# Patient Record
Sex: MALE | Race: BLACK OR AFRICAN AMERICAN | NOT HISPANIC OR LATINO | Employment: OTHER | URBAN - METROPOLITAN AREA
[De-identification: names, ages, dates, MRNs, and addresses within clinical notes are randomized per-mention and may not be internally consistent; named-entity substitution may affect disease eponyms.]

---

## 2017-10-03 LAB
COLOR STL: NORMAL
CONSISTENCY STL: NORMAL
HEMOCCULT SP1 STL QL: NEGATIVE

## 2017-10-04 LAB
COLOR STL: NORMAL
COLOR STL: NORMAL
CONSISTENCY STL: NORMAL
CONSISTENCY STL: NORMAL
HEMOCCULT SP2 STL QL: NEGATIVE

## 2017-10-05 ENCOUNTER — HISTORICAL (OUTPATIENT)
Dept: INTERNAL MEDICINE | Facility: CLINIC | Age: 57
End: 2017-10-05

## 2017-10-05 LAB
ABS NEUT (OLG): 3.82 X10(3)/MCL (ref 2.1–9.2)
ALBUMIN SERPL-MCNC: 3.7 GM/DL (ref 3.4–5)
ALBUMIN/GLOB SERPL: 1 RATIO (ref 1–2)
ALP SERPL-CCNC: 59 UNIT/L (ref 45–117)
ALT SERPL-CCNC: 35 UNIT/L (ref 12–78)
AST SERPL-CCNC: 31 UNIT/L (ref 15–37)
BASOPHILS # BLD AUTO: 0.07 X10(3)/MCL
BASOPHILS NFR BLD AUTO: 1 % (ref 0–1)
BILIRUB SERPL-MCNC: 0.6 MG/DL (ref 0.2–1)
BILIRUBIN DIRECT+TOT PNL SERPL-MCNC: 0.2 MG/DL
BILIRUBIN DIRECT+TOT PNL SERPL-MCNC: 0.4 MG/DL
BUN SERPL-MCNC: 14 MG/DL (ref 7–18)
CALCIUM SERPL-MCNC: 8.7 MG/DL (ref 8.5–10.1)
CHLORIDE SERPL-SCNC: 104 MMOL/L (ref 98–107)
CHOLEST SERPL-MCNC: 155 MG/DL
CHOLEST/HDLC SERPL: 3.5 {RATIO} (ref 0–5)
CO2 SERPL-SCNC: 30 MMOL/L (ref 21–32)
CREAT SERPL-MCNC: 0.8 MG/DL (ref 0.6–1.3)
EOSINOPHIL # BLD AUTO: 0.47 X10(3)/MCL
EOSINOPHIL NFR BLD AUTO: 7 % (ref 0–5)
ERYTHROCYTE [DISTWIDTH] IN BLOOD BY AUTOMATED COUNT: 13 % (ref 11.5–14.5)
EST. AVERAGE GLUCOSE BLD GHB EST-MCNC: 120 MG/DL
GLOBULIN SER-MCNC: 3.6 GM/ML (ref 2.3–3.5)
GLUCOSE SERPL-MCNC: 96 MG/DL (ref 74–106)
HBA1C MFR BLD: 5.8 % (ref 4.2–6.3)
HCT VFR BLD AUTO: 38.8 % (ref 40–51)
HDLC SERPL-MCNC: 44 MG/DL
HGB BLD-MCNC: 13.5 GM/DL (ref 13.5–17.5)
IMM GRANULOCYTES # BLD AUTO: 0.03 10*3/UL
IMM GRANULOCYTES NFR BLD AUTO: 0 %
LDLC SERPL CALC-MCNC: 92 MG/DL (ref 0–130)
LYMPHOCYTES # BLD AUTO: 1.82 X10(3)/MCL
LYMPHOCYTES NFR BLD AUTO: 27 % (ref 15–40)
MCH RBC QN AUTO: 30.5 PG (ref 26–34)
MCHC RBC AUTO-ENTMCNC: 34.8 GM/DL (ref 31–37)
MCV RBC AUTO: 87.8 FL (ref 80–100)
MONOCYTES # BLD AUTO: 0.62 X10(3)/MCL
MONOCYTES NFR BLD AUTO: 9 % (ref 4–12)
NEUTROPHILS # BLD AUTO: 3.82 X10(3)/MCL
NEUTROPHILS NFR BLD AUTO: 56 X10(3)/MCL
PLATELET # BLD AUTO: 207 X10(3)/MCL (ref 130–400)
PMV BLD AUTO: 11.1 FL (ref 7.4–10.4)
POTASSIUM SERPL-SCNC: 3.5 MMOL/L (ref 3.5–5.1)
PROT SERPL-MCNC: 7.3 GM/DL (ref 6.4–8.2)
PSA SERPL-MCNC: 0.6 NG/ML
RBC # BLD AUTO: 4.42 X10(6)/MCL (ref 4.5–5.9)
SODIUM SERPL-SCNC: 140 MMOL/L (ref 136–145)
TRIGL SERPL-MCNC: 94 MG/DL
TSH SERPL-ACNC: 0.38 MIU/L (ref 0.36–3.74)
VLDLC SERPL CALC-MCNC: 19 MG/DL
WBC # SPEC AUTO: 6.8 X10(3)/MCL (ref 4.5–11)

## 2019-04-10 ENCOUNTER — HISTORICAL (OUTPATIENT)
Dept: ADMINISTRATIVE | Facility: HOSPITAL | Age: 59
End: 2019-04-10

## 2019-04-10 LAB
ABS NEUT (OLG): 4.35 X10(3)/MCL (ref 2.1–9.2)
ALBUMIN SERPL-MCNC: 3.9 GM/DL (ref 3.4–5)
ALBUMIN/GLOB SERPL: 1 RATIO (ref 1.1–2)
ALP SERPL-CCNC: 64 UNIT/L (ref 45–117)
ALT SERPL-CCNC: 39 UNIT/L (ref 12–78)
AST SERPL-CCNC: 36 UNIT/L (ref 15–37)
BASOPHILS # BLD AUTO: 0.09 X10(3)/MCL
BASOPHILS NFR BLD AUTO: 1 %
BILIRUB SERPL-MCNC: 0.6 MG/DL (ref 0.2–1)
BILIRUBIN DIRECT+TOT PNL SERPL-MCNC: 0.2 MG/DL
BILIRUBIN DIRECT+TOT PNL SERPL-MCNC: 0.4 MG/DL
BUN SERPL-MCNC: 21 MG/DL (ref 7–18)
CALCIUM SERPL-MCNC: 9 MG/DL (ref 8.5–10.1)
CHLORIDE SERPL-SCNC: 103 MMOL/L (ref 98–107)
CHOLEST SERPL-MCNC: 163 MG/DL
CHOLEST/HDLC SERPL: 3.8 {RATIO} (ref 0–5)
CO2 SERPL-SCNC: 32 MMOL/L (ref 21–32)
CREAT SERPL-MCNC: 0.8 MG/DL (ref 0.6–1.3)
EOSINOPHIL # BLD AUTO: 0.69 10*3/UL
EOSINOPHIL NFR BLD AUTO: 8 %
ERYTHROCYTE [DISTWIDTH] IN BLOOD BY AUTOMATED COUNT: 13.8 % (ref 11.5–14.5)
EST. AVERAGE GLUCOSE BLD GHB EST-MCNC: 134 MG/DL
GLOBULIN SER-MCNC: 3.8 GM/ML (ref 2.3–3.5)
GLUCOSE SERPL-MCNC: 123 MG/DL (ref 74–106)
HBA1C MFR BLD: 6.3 % (ref 4.2–6.3)
HCT VFR BLD AUTO: 42 % (ref 40–51)
HDLC SERPL-MCNC: 43 MG/DL
HGB BLD-MCNC: 14.3 GM/DL (ref 13.5–17.5)
IMM GRANULOCYTES # BLD AUTO: 0.02 10*3/UL
IMM GRANULOCYTES NFR BLD AUTO: 0 %
LDLC SERPL CALC-MCNC: 94 MG/DL (ref 0–130)
LYMPHOCYTES # BLD AUTO: 2.09 X10(3)/MCL
LYMPHOCYTES NFR BLD AUTO: 26 % (ref 13–40)
MCH RBC QN AUTO: 30 PG (ref 26–34)
MCHC RBC AUTO-ENTMCNC: 34 GM/DL (ref 31–37)
MCV RBC AUTO: 88.2 FL (ref 80–100)
MONOCYTES # BLD AUTO: 0.84 X10(3)/MCL
MONOCYTES NFR BLD AUTO: 10 % (ref 4–12)
NEUTROPHILS # BLD AUTO: 4.35 X10(3)/MCL
NEUTROPHILS NFR BLD AUTO: 54 X10(3)/MCL
PLATELET # BLD AUTO: 210 X10(3)/MCL (ref 130–400)
PMV BLD AUTO: 10.9 FL (ref 7.4–10.4)
POTASSIUM SERPL-SCNC: 3.4 MMOL/L (ref 3.5–5.1)
PROT SERPL-MCNC: 7.7 GM/DL (ref 6.4–8.2)
RBC # BLD AUTO: 4.76 X10(6)/MCL (ref 4.5–5.9)
SODIUM SERPL-SCNC: 140 MMOL/L (ref 136–145)
TRIGL SERPL-MCNC: 129 MG/DL
TSH SERPL-ACNC: 0.78 MIU/L (ref 0.36–3.74)
VLDLC SERPL CALC-MCNC: 26 MG/DL
WBC # SPEC AUTO: 8.1 X10(3)/MCL (ref 4.5–11)

## 2020-05-20 ENCOUNTER — HISTORICAL (OUTPATIENT)
Dept: INTERNAL MEDICINE | Facility: CLINIC | Age: 60
End: 2020-05-20

## 2020-05-20 LAB
ABS NEUT (OLG): 4.03 X10(3)/MCL (ref 2.1–9.2)
ALBUMIN SERPL-MCNC: 3.2 GM/DL (ref 3.4–5)
ALBUMIN/GLOB SERPL: 0.7 RATIO (ref 1.1–2)
ALP SERPL-CCNC: 95 UNIT/L (ref 45–117)
ALT SERPL-CCNC: 45 UNIT/L (ref 12–78)
AST SERPL-CCNC: 30 UNIT/L (ref 15–37)
BASOPHILS # BLD AUTO: 0.1 X10(3)/MCL (ref 0–0.2)
BASOPHILS NFR BLD AUTO: 1 %
BILIRUB SERPL-MCNC: 0.6 MG/DL (ref 0.2–1)
BILIRUBIN DIRECT+TOT PNL SERPL-MCNC: 0.2 MG/DL (ref 0–0.2)
BILIRUBIN DIRECT+TOT PNL SERPL-MCNC: 0.4 MG/DL
BUN SERPL-MCNC: 13 MG/DL (ref 7–18)
CALCIUM SERPL-MCNC: 8.8 MG/DL (ref 8.5–10.1)
CHLORIDE SERPL-SCNC: 103 MMOL/L (ref 98–107)
CHOLEST SERPL-MCNC: 171 MG/DL
CHOLEST/HDLC SERPL: 4.8 {RATIO} (ref 0–5)
CO2 SERPL-SCNC: 30 MMOL/L (ref 21–32)
CREAT SERPL-MCNC: 0.9 MG/DL (ref 0.6–1.3)
EOSINOPHIL # BLD AUTO: 0.8 X10(3)/MCL (ref 0–0.9)
EOSINOPHIL NFR BLD AUTO: 11 %
ERYTHROCYTE [DISTWIDTH] IN BLOOD BY AUTOMATED COUNT: 13.3 % (ref 11.5–14.5)
EST. AVERAGE GLUCOSE BLD GHB EST-MCNC: 148 MG/DL
GLOBULIN SER-MCNC: 4.9 GM/ML (ref 2.3–3.5)
GLUCOSE SERPL-MCNC: 107 MG/DL (ref 74–106)
HBA1C MFR BLD: 6.8 % (ref 4.2–6.3)
HCT VFR BLD AUTO: 42.5 % (ref 40–51)
HDLC SERPL-MCNC: 36 MG/DL (ref 40–59)
HGB BLD-MCNC: 13.9 GM/DL (ref 13.5–17.5)
IMM GRANULOCYTES # BLD AUTO: 0.02 10*3/UL
IMM GRANULOCYTES NFR BLD AUTO: 0 %
LDLC SERPL CALC-MCNC: 114 MG/DL
LYMPHOCYTES # BLD AUTO: 1.7 X10(3)/MCL (ref 0.6–4.6)
LYMPHOCYTES NFR BLD AUTO: 23 %
MCH RBC QN AUTO: 28.6 PG (ref 26–34)
MCHC RBC AUTO-ENTMCNC: 32.7 GM/DL (ref 31–37)
MCV RBC AUTO: 87.4 FL (ref 80–100)
MONOCYTES # BLD AUTO: 0.8 X10(3)/MCL (ref 0.1–1.3)
MONOCYTES NFR BLD AUTO: 11 %
NEUTROPHILS # BLD AUTO: 4.03 X10(3)/MCL (ref 2.1–9.2)
NEUTROPHILS NFR BLD AUTO: 54 %
PLATELET # BLD AUTO: 273 X10(3)/MCL (ref 130–400)
PMV BLD AUTO: 11.1 FL (ref 7.4–10.4)
POTASSIUM SERPL-SCNC: 3.5 MMOL/L (ref 3.5–5.1)
PROT SERPL-MCNC: 8.1 GM/DL (ref 6.4–8.2)
RBC # BLD AUTO: 4.86 X10(6)/MCL (ref 4.5–5.9)
SODIUM SERPL-SCNC: 139 MMOL/L (ref 136–145)
TRIGL SERPL-MCNC: 103 MG/DL
TSH SERPL-ACNC: 0.41 MIU/L (ref 0.36–3.74)
VLDLC SERPL CALC-MCNC: 21 MG/DL
WBC # SPEC AUTO: 7.5 X10(3)/MCL (ref 4.5–11)

## 2020-09-03 ENCOUNTER — HOSPITAL ENCOUNTER (OUTPATIENT)
Dept: MEDSURG UNIT | Facility: HOSPITAL | Age: 60
End: 2020-09-05
Attending: INTERNAL MEDICINE | Admitting: INTERNAL MEDICINE

## 2020-09-03 LAB
ABS NEUT (OLG): 5.61 X10(3)/MCL (ref 2.1–9.2)
ALBUMIN SERPL-MCNC: 2.9 GM/DL (ref 3.4–5)
ALBUMIN/GLOB SERPL: 0.6 RATIO (ref 1.1–2)
ALP SERPL-CCNC: 77 UNIT/L (ref 45–117)
ALT SERPL-CCNC: 30 UNIT/L (ref 12–78)
APPEARANCE, UA: CLEAR
AST SERPL-CCNC: 31 UNIT/L (ref 15–37)
BACTERIA #/AREA URNS AUTO: ABNORMAL /HPF
BASOPHILS # BLD AUTO: 0 X10(3)/MCL (ref 0–0.2)
BASOPHILS NFR BLD AUTO: 0 %
BILIRUB SERPL-MCNC: 0.5 MG/DL (ref 0.2–1)
BILIRUB UR QL STRIP: NEGATIVE
BILIRUBIN DIRECT+TOT PNL SERPL-MCNC: 0.2 MG/DL (ref 0–0.2)
BILIRUBIN DIRECT+TOT PNL SERPL-MCNC: 0.3 MG/DL
BUN SERPL-MCNC: 16 MG/DL (ref 7–18)
CALCIUM SERPL-MCNC: 8.7 MG/DL (ref 8.5–10.1)
CHLORIDE SERPL-SCNC: 105 MMOL/L (ref 98–107)
CK MB SERPL-MCNC: 5.4 NG/ML (ref 1–3.6)
CK SERPL-CCNC: 426 UNIT/L (ref 39–308)
CO2 SERPL-SCNC: 29 MMOL/L (ref 21–32)
COLOR UR: YELLOW
CREAT SERPL-MCNC: 0.7 MG/DL (ref 0.6–1.3)
D DIMER PPP IA.FEU-MCNC: 2.3 MCG/ML FEU
EOSINOPHIL # BLD AUTO: 0.9 X10(3)/MCL (ref 0–0.9)
EOSINOPHIL NFR BLD AUTO: 11 %
ERYTHROCYTE [DISTWIDTH] IN BLOOD BY AUTOMATED COUNT: 13.9 % (ref 11.5–14.5)
GLOBULIN SER-MCNC: 5.1 GM/ML (ref 2.3–3.5)
GLUCOSE (UA): NEGATIVE
GLUCOSE SERPL-MCNC: 106 MG/DL (ref 74–106)
HCO3 UR-SCNC: 29.9 MMOL/L (ref 22–26)
HCT VFR BLD AUTO: 38.6 % (ref 40–51)
HGB BLD-MCNC: 12.5 GM/DL (ref 13.5–17.5)
HGB UR QL STRIP: NEGATIVE
HYALINE CASTS #/AREA URNS LPF: ABNORMAL /LPF
IMM GRANULOCYTES # BLD AUTO: 0.03 10*3/UL
IMM GRANULOCYTES NFR BLD AUTO: 0 %
KETONES UR QL STRIP: NEGATIVE
LEUKOCYTE ESTERASE UR QL STRIP: NEGATIVE
LYMPHOCYTES # BLD AUTO: 0.9 X10(3)/MCL (ref 0.6–4.6)
LYMPHOCYTES NFR BLD AUTO: 11 %
MCH RBC QN AUTO: 27.8 PG (ref 26–34)
MCHC RBC AUTO-ENTMCNC: 32.4 GM/DL (ref 31–37)
MCV RBC AUTO: 86 FL (ref 80–100)
MONOCYTES # BLD AUTO: 0.7 X10(3)/MCL (ref 0.1–1.3)
MONOCYTES NFR BLD AUTO: 8 %
NEUTROPHILS # BLD AUTO: 5.61 X10(3)/MCL (ref 2.1–9.2)
NEUTROPHILS NFR BLD AUTO: 68 %
NITRITE UR QL STRIP: NEGATIVE
O2 HGB ARTERIAL: 91.8 % (ref 94–100)
PCO2 BLDA: 42 MMHG (ref 35–45)
PH SMN: 7.46 [PH] (ref 7.35–7.45)
PH UR STRIP: 7.5 [PH] (ref 4.5–8)
PLATELET # BLD AUTO: 282 X10(3)/MCL (ref 130–400)
PMV BLD AUTO: 10.1 FL (ref 7.4–10.4)
PO2 BLDA: 62 MMHG (ref 80–100)
POC ALLENS TEST: POSITIVE
POC BE: 5.5 (ref -2–2)
POC CO HGB: 2.2 %
POC CO2: 31.2 MMOL/L (ref 22–26)
POC MET HGB: 1 % (ref 0.4–1.5)
POC SAMPLESOURCE: ABNORMAL
POC SATURATED O2: 94.8 %
POC SITE: ABNORMAL
POC THB: 13.5 GM/DL (ref 12–16)
POC TREATMENT: ABNORMAL
POTASSIUM SERPL-SCNC: 3.9 MMOL/L (ref 3.5–5.1)
PROT SERPL-MCNC: 8 GM/DL (ref 6.4–8.2)
PROT UR QL STRIP: NEGATIVE
RBC # BLD AUTO: 4.49 X10(6)/MCL (ref 4.5–5.9)
RBC #/AREA URNS AUTO: ABNORMAL /HPF
SARS-COV-2 AG RESP QL IA.RAPID: NEGATIVE
SODIUM SERPL-SCNC: 138 MMOL/L (ref 136–145)
SP GR UR STRIP: 1.02 (ref 1–1.03)
SQUAMOUS #/AREA URNS LPF: ABNORMAL /LPF
TROPONIN I SERPL-MCNC: 0.03 NG/ML (ref 0–0.05)
UROBILINOGEN UR STRIP-ACNC: 4 MG/DL
WBC # SPEC AUTO: 8.2 X10(3)/MCL (ref 4.5–11)
WBC #/AREA URNS AUTO: ABNORMAL /HPF

## 2020-09-04 LAB
ABS NEUT (OLG): 6.37 X10(3)/MCL (ref 2.1–9.2)
ALBUMIN SERPL-MCNC: 2.6 GM/DL (ref 3.4–5)
ALBUMIN/GLOB SERPL: 0.5 RATIO (ref 1.1–2)
ALP SERPL-CCNC: 74 UNIT/L (ref 45–117)
ALT SERPL-CCNC: 27 UNIT/L (ref 12–78)
AST SERPL-CCNC: 33 UNIT/L (ref 15–37)
BASOPHILS # BLD AUTO: 0 X10(3)/MCL (ref 0–0.2)
BASOPHILS NFR BLD AUTO: 0 %
BILIRUB SERPL-MCNC: 0.4 MG/DL (ref 0.2–1)
BILIRUBIN DIRECT+TOT PNL SERPL-MCNC: 0.2 MG/DL
BILIRUBIN DIRECT+TOT PNL SERPL-MCNC: 0.2 MG/DL (ref 0–0.2)
BUN SERPL-MCNC: 14 MG/DL (ref 7–18)
CALCIUM SERPL-MCNC: 8.8 MG/DL (ref 8.5–10.1)
CHLORIDE SERPL-SCNC: 105 MMOL/L (ref 98–107)
CO2 SERPL-SCNC: 24 MMOL/L (ref 21–32)
CREAT SERPL-MCNC: 0.6 MG/DL (ref 0.6–1.3)
EOSINOPHIL # BLD AUTO: 0.8 X10(3)/MCL (ref 0–0.9)
EOSINOPHIL NFR BLD AUTO: 9 %
ERYTHROCYTE [DISTWIDTH] IN BLOOD BY AUTOMATED COUNT: 13.8 % (ref 11.5–14.5)
GLOBULIN SER-MCNC: 4.8 GM/ML (ref 2.3–3.5)
GLUCOSE SERPL-MCNC: 112 MG/DL (ref 74–106)
HAV IGM SERPL QL IA: NONREACTIVE
HBV CORE IGM SERPL QL IA: NONREACTIVE
HBV SURFACE AG SERPL QL IA: NONREACTIVE
HCT VFR BLD AUTO: 39.6 % (ref 40–51)
HCV AB SERPL QL IA: NONREACTIVE
HGB BLD-MCNC: 12.7 GM/DL (ref 13.5–17.5)
HIV 1+2 AB+HIV1 P24 AG SERPL QL IA: NONREACTIVE
IMM GRANULOCYTES # BLD AUTO: 0.04 10*3/UL
IMM GRANULOCYTES NFR BLD AUTO: 0 %
LYMPHOCYTES # BLD AUTO: 1.2 X10(3)/MCL (ref 0.6–4.6)
LYMPHOCYTES NFR BLD AUTO: 13 %
MCH RBC QN AUTO: 28 PG (ref 26–34)
MCHC RBC AUTO-ENTMCNC: 32.1 GM/DL (ref 31–37)
MCV RBC AUTO: 87.2 FL (ref 80–100)
MONOCYTES # BLD AUTO: 0.7 X10(3)/MCL (ref 0.1–1.3)
MONOCYTES NFR BLD AUTO: 8 %
NEUTROPHILS # BLD AUTO: 6.37 X10(3)/MCL (ref 2.1–9.2)
NEUTROPHILS NFR BLD AUTO: 69 %
PLATELET # BLD AUTO: 252 X10(3)/MCL (ref 130–400)
PMV BLD AUTO: 10.5 FL (ref 7.4–10.4)
POTASSIUM SERPL-SCNC: 4 MMOL/L (ref 3.5–5.1)
PROT SERPL-MCNC: 7.4 GM/DL (ref 6.4–8.2)
RBC # BLD AUTO: 4.54 X10(6)/MCL (ref 4.5–5.9)
SODIUM SERPL-SCNC: 136 MMOL/L (ref 136–145)
WBC # SPEC AUTO: 9.2 X10(3)/MCL (ref 4.5–11)

## 2020-09-05 LAB
ABS NEUT (OLG): 10.32 X10(3)/MCL (ref 2.1–9.2)
ALBUMIN SERPL-MCNC: 2.7 GM/DL (ref 3.4–5)
ALBUMIN/GLOB SERPL: 0.5 RATIO (ref 1.1–2)
ALP SERPL-CCNC: 79 UNIT/L (ref 45–117)
ALT SERPL-CCNC: 29 UNIT/L (ref 12–78)
ANTINUCLEAR ANTIBODY SCREEN (OHS): NEGATIVE
AST SERPL-CCNC: 25 UNIT/L (ref 15–37)
BASOPHILS # BLD AUTO: 0 X10(3)/MCL (ref 0–0.2)
BASOPHILS NFR BLD AUTO: 0 %
BILIRUB SERPL-MCNC: 0.5 MG/DL (ref 0.2–1)
BILIRUBIN DIRECT+TOT PNL SERPL-MCNC: 0.2 MG/DL (ref 0–0.2)
BILIRUBIN DIRECT+TOT PNL SERPL-MCNC: 0.3 MG/DL
BUN SERPL-MCNC: 13 MG/DL (ref 7–18)
CALCIUM SERPL-MCNC: 8.8 MG/DL (ref 8.5–10.1)
CHLORIDE SERPL-SCNC: 103 MMOL/L (ref 98–107)
CO2 SERPL-SCNC: 28 MMOL/L (ref 21–32)
CREAT SERPL-MCNC: 0.7 MG/DL (ref 0.6–1.3)
DSDNA ANTIBODY (OHS): NEGATIVE
EOSINOPHIL # BLD AUTO: 0 X10(3)/MCL (ref 0–0.9)
EOSINOPHIL NFR BLD AUTO: 0 %
ERYTHROCYTE [DISTWIDTH] IN BLOOD BY AUTOMATED COUNT: 13.4 % (ref 11.5–14.5)
FERRITIN SERPL-MCNC: 182.5 NG/ML (ref 26–388)
GLOBULIN SER-MCNC: 5.1 GM/ML (ref 2.3–3.5)
GLUCOSE SERPL-MCNC: 186 MG/DL (ref 74–106)
HCT VFR BLD AUTO: 40.4 % (ref 40–51)
HGB BLD-MCNC: 12.9 GM/DL (ref 13.5–17.5)
IMM GRANULOCYTES # BLD AUTO: 0.08 10*3/UL
IMM GRANULOCYTES NFR BLD AUTO: 1 %
IRON SATN MFR SERPL: 8.7 % (ref 15–50)
IRON SERPL-MCNC: 26 MCG/DL (ref 65–175)
LYMPHOCYTES # BLD AUTO: 0.5 X10(3)/MCL (ref 0.6–4.6)
LYMPHOCYTES NFR BLD AUTO: 4 %
MCH RBC QN AUTO: 27.3 PG (ref 26–34)
MCHC RBC AUTO-ENTMCNC: 31.9 GM/DL (ref 31–37)
MCV RBC AUTO: 85.6 FL (ref 80–100)
MONOCYTES # BLD AUTO: 0.2 X10(3)/MCL (ref 0.1–1.3)
MONOCYTES NFR BLD AUTO: 2 %
NEUTROPHILS # BLD AUTO: 10.32 X10(3)/MCL (ref 2.1–9.2)
NEUTROPHILS NFR BLD AUTO: 93 %
PLATELET # BLD AUTO: 285 X10(3)/MCL (ref 130–400)
PMV BLD AUTO: 10.6 FL (ref 7.4–10.4)
POTASSIUM SERPL-SCNC: 4.2 MMOL/L (ref 3.5–5.1)
PROT SERPL-MCNC: 7.8 GM/DL (ref 6.4–8.2)
RBC # BLD AUTO: 4.72 X10(6)/MCL (ref 4.5–5.9)
SODIUM SERPL-SCNC: 136 MMOL/L (ref 136–145)
TIBC SERPL-MCNC: 298 MCG/DL (ref 250–450)
TRANSFERRIN SERPL-MCNC: 235 MG/DL (ref 200–360)
WBC # SPEC AUTO: 11.1 X10(3)/MCL (ref 4.5–11)

## 2020-09-10 ENCOUNTER — HISTORICAL (OUTPATIENT)
Dept: ADMINISTRATIVE | Facility: HOSPITAL | Age: 60
End: 2020-09-10

## 2020-09-17 ENCOUNTER — HISTORICAL (OUTPATIENT)
Dept: INTERNAL MEDICINE | Facility: CLINIC | Age: 60
End: 2020-09-17

## 2020-09-24 ENCOUNTER — HISTORICAL (OUTPATIENT)
Dept: RESPIRATORY THERAPY | Facility: HOSPITAL | Age: 60
End: 2020-09-24

## 2020-10-06 ENCOUNTER — HISTORICAL (OUTPATIENT)
Dept: ADMINISTRATIVE | Facility: HOSPITAL | Age: 60
End: 2020-10-06

## 2021-01-12 ENCOUNTER — HISTORICAL (OUTPATIENT)
Dept: ADMINISTRATIVE | Facility: HOSPITAL | Age: 61
End: 2021-01-12

## 2021-03-12 ENCOUNTER — HISTORICAL (OUTPATIENT)
Dept: ADMINISTRATIVE | Facility: HOSPITAL | Age: 61
End: 2021-03-12

## 2021-03-12 LAB
ABS NEUT (OLG): 9.08 X10(3)/MCL (ref 2.1–9.2)
ALBUMIN SERPL-MCNC: 3.6 GM/DL (ref 3.4–4.8)
ALBUMIN/GLOB SERPL: 1.1 RATIO (ref 1.1–2)
ALP SERPL-CCNC: 67 UNIT/L (ref 40–150)
ALT SERPL-CCNC: 17 UNIT/L (ref 0–55)
APPEARANCE, UA: CLEAR
AST SERPL-CCNC: 7 UNIT/L (ref 5–34)
BACTERIA #/AREA URNS AUTO: ABNORMAL /HPF
BASOPHILS # BLD AUTO: 0 X10(3)/MCL (ref 0–0.2)
BASOPHILS NFR BLD AUTO: 0 %
BILIRUB SERPL-MCNC: 0.6 MG/DL
BILIRUB UR QL STRIP: NEGATIVE
BILIRUBIN DIRECT+TOT PNL SERPL-MCNC: 0.3 MG/DL (ref 0–0.5)
BILIRUBIN DIRECT+TOT PNL SERPL-MCNC: 0.3 MG/DL (ref 0–0.8)
BUN SERPL-MCNC: 25.4 MG/DL (ref 8.4–25.7)
C3 SERPL-MCNC: 136 MG/DL (ref 80–173)
C4 SERPL-MCNC: 37.3 MG/DL (ref 13–46)
CALCIUM SERPL-MCNC: 9.2 MG/DL (ref 8.8–10)
CHLORIDE SERPL-SCNC: 95 MMOL/L (ref 98–107)
CHOLEST SERPL-MCNC: 213 MG/DL
CHOLEST/HDLC SERPL: 5 {RATIO} (ref 0–5)
CK SERPL-CCNC: 37 U/L (ref 30–200)
CO2 SERPL-SCNC: 27 MMOL/L (ref 23–31)
COLOR UR: ABNORMAL
CREAT SERPL-MCNC: 1.09 MG/DL (ref 0.73–1.18)
CREAT UR-MCNC: 97.6 MG/DL (ref 58–161)
CRP SERPL-MCNC: 1.1 MG/DL
EOSINOPHIL # BLD AUTO: 0.1 X10(3)/MCL (ref 0–0.9)
EOSINOPHIL NFR BLD AUTO: 1 %
ERYTHROCYTE [DISTWIDTH] IN BLOOD BY AUTOMATED COUNT: 15.9 % (ref 11.5–14.5)
ERYTHROCYTE [SEDIMENTATION RATE] IN BLOOD: 16 MM/HR (ref 0–15)
EST. AVERAGE GLUCOSE BLD GHB EST-MCNC: 263.3 MG/DL
GLOBULIN SER-MCNC: 3.3 GM/DL (ref 2.4–3.5)
GLUCOSE (UA): >1000 MG/DL
GLUCOSE SERPL-MCNC: 502 MG/DL (ref 82–115)
HBA1C MFR BLD: 10.8 %
HBV CORE AB SERPL QL IA: NONREACTIVE
HBV CORE IGM SERPL QL IA: NONREACTIVE
HBV SURFACE AG SERPL QL IA: NONREACTIVE
HCT VFR BLD AUTO: 42.9 % (ref 40–51)
HCV AB SERPL QL IA: NONREACTIVE
HDLC SERPL-MCNC: 45 MG/DL (ref 35–60)
HGB BLD-MCNC: 14.2 GM/DL (ref 13.5–17.5)
HGB UR QL STRIP: NEGATIVE
HYALINE CASTS #/AREA URNS LPF: ABNORMAL /LPF
IMM GRANULOCYTES # BLD AUTO: 0.44 10*3/UL
IMM GRANULOCYTES NFR BLD AUTO: 3 %
KETONES UR QL STRIP: NEGATIVE
LDLC SERPL CALC-MCNC: 135 MG/DL (ref 50–140)
LEUKOCYTE ESTERASE UR QL STRIP: NEGATIVE
LYMPHOCYTES # BLD AUTO: 2.2 X10(3)/MCL (ref 0.6–4.6)
LYMPHOCYTES NFR BLD AUTO: 17 %
MCH RBC QN AUTO: 28.7 PG (ref 26–34)
MCHC RBC AUTO-ENTMCNC: 33.1 GM/DL (ref 31–37)
MCV RBC AUTO: 86.7 FL (ref 80–100)
MONOCYTES # BLD AUTO: 1 X10(3)/MCL (ref 0.1–1.3)
MONOCYTES NFR BLD AUTO: 7 %
NEG CONT SPOT COUNT: NORMAL
NEUTROPHILS # BLD AUTO: 9.08 X10(3)/MCL (ref 2.1–9.2)
NEUTROPHILS NFR BLD AUTO: 71 %
NITRITE UR QL STRIP: NEGATIVE
PANEL A SPOT COUNT: 0
PANEL B SPOT COUNT: 0
PH UR STRIP: 5 [PH] (ref 4.5–8)
PLATELET # BLD AUTO: 224 X10(3)/MCL (ref 130–400)
PMV BLD AUTO: 10.5 FL (ref 7.4–10.4)
POS CONT SPOT COUNT: NORMAL
POTASSIUM SERPL-SCNC: 3.9 MMOL/L (ref 3.5–5.1)
PROT SERPL-MCNC: 6.9 GM/DL (ref 5.8–7.6)
PROT UR QL STRIP: 10 MG/DL
PROT UR STRIP-MCNC: 15.4 MG/DL
PROT/CREAT UR-RTO: 157.8 MG/GM CR
RBC # BLD AUTO: 4.95 X10(6)/MCL (ref 4.5–5.9)
RBC #/AREA URNS AUTO: ABNORMAL /HPF
SODIUM SERPL-SCNC: 133 MMOL/L (ref 136–145)
SP GR UR STRIP: 1.04 (ref 1–1.03)
SQUAMOUS #/AREA URNS LPF: ABNORMAL /LPF
T-SPOT.TB: NORMAL
T4 FREE SERPL-MCNC: 0.92 NG/DL (ref 0.7–1.48)
TRIGL SERPL-MCNC: 166 MG/DL (ref 34–140)
TSH SERPL-ACNC: 0.23 UIU/ML (ref 0.35–4.94)
UROBILINOGEN UR STRIP-ACNC: NORMAL
VLDLC SERPL CALC-MCNC: 33 MG/DL
WBC # SPEC AUTO: 12.8 X10(3)/MCL (ref 4.5–11)
WBC #/AREA URNS AUTO: ABNORMAL /HPF

## 2021-03-31 ENCOUNTER — HISTORICAL (OUTPATIENT)
Dept: RADIOLOGY | Facility: HOSPITAL | Age: 61
End: 2021-03-31

## 2021-04-20 ENCOUNTER — HISTORICAL (OUTPATIENT)
Dept: ADMINISTRATIVE | Facility: HOSPITAL | Age: 61
End: 2021-04-20

## 2021-04-20 LAB
BUN SERPL-MCNC: 20.6 MG/DL (ref 8.4–25.7)
CALCIUM SERPL-MCNC: 9.2 MG/DL (ref 8.8–10)
CHLORIDE SERPL-SCNC: 100 MMOL/L (ref 98–107)
CO2 SERPL-SCNC: 29 MMOL/L (ref 23–31)
CREAT SERPL-MCNC: 0.81 MG/DL (ref 0.73–1.18)
CREAT/UREA NIT SERPL: 25
GLUCOSE SERPL-MCNC: 255 MG/DL (ref 82–115)
POTASSIUM SERPL-SCNC: 3.9 MMOL/L (ref 3.5–5.1)
SODIUM SERPL-SCNC: 136 MMOL/L (ref 136–145)
T3FREE SERPL-MCNC: 2.01 PG/ML (ref 1.71–3.71)
T4 FREE SERPL-MCNC: 0.88 NG/DL (ref 0.7–1.48)
T4 SERPL-MCNC: 5.09 UG/DL (ref 4.87–11.72)
TSH SERPL-ACNC: 0.31 UIU/ML (ref 0.35–4.94)

## 2021-04-26 ENCOUNTER — HISTORICAL (OUTPATIENT)
Dept: LAB | Facility: HOSPITAL | Age: 61
End: 2021-04-26

## 2021-04-26 LAB
ABS NEUT (OLG): 4.79 X10(3)/MCL (ref 2.1–9.2)
ALBUMIN SERPL-MCNC: 3.2 GM/DL (ref 3.4–4.8)
ALBUMIN/GLOB SERPL: 1.2 RATIO (ref 1.1–2)
ALP SERPL-CCNC: 53 UNIT/L (ref 40–150)
ALT SERPL-CCNC: 25 UNIT/L (ref 0–55)
AST SERPL-CCNC: 12 UNIT/L (ref 5–34)
BASOPHILS # BLD AUTO: 0 X10(3)/MCL (ref 0–0.2)
BASOPHILS NFR BLD AUTO: 0 %
BILIRUB SERPL-MCNC: 0.6 MG/DL
BILIRUBIN DIRECT+TOT PNL SERPL-MCNC: 0.2 MG/DL (ref 0–0.5)
BILIRUBIN DIRECT+TOT PNL SERPL-MCNC: 0.4 MG/DL (ref 0–0.8)
BUN SERPL-MCNC: 20.4 MG/DL (ref 8.4–25.7)
C3 SERPL-MCNC: 110 MG/DL (ref 80–173)
C4 SERPL-MCNC: 31.6 MG/DL (ref 13–46)
CALCIUM SERPL-MCNC: 8.9 MG/DL (ref 8.8–10)
CHLORIDE SERPL-SCNC: 102 MMOL/L (ref 98–107)
CK SERPL-CCNC: 70 U/L (ref 30–200)
CO2 SERPL-SCNC: 32 MMOL/L (ref 23–31)
CORTIS SERPL-SCNC: 6.6 UG/DL
CREAT SERPL-MCNC: 0.74 MG/DL (ref 0.73–1.18)
EOSINOPHIL # BLD AUTO: 0.1 X10(3)/MCL (ref 0–0.9)
EOSINOPHIL NFR BLD AUTO: 1 %
ERYTHROCYTE [DISTWIDTH] IN BLOOD BY AUTOMATED COUNT: 17.6 % (ref 11.5–14.5)
ERYTHROCYTE [SEDIMENTATION RATE] IN BLOOD: 16 MM/HR (ref 0–15)
FSH SERPL-ACNC: 8.55 MIU/ML (ref 0.7–10.8)
GLOBULIN SER-MCNC: 2.7 GM/DL (ref 2.4–3.5)
GLUCOSE SERPL-MCNC: 142 MG/DL (ref 82–115)
HCT VFR BLD AUTO: 37 % (ref 40–51)
HGB BLD-MCNC: 11.7 GM/DL (ref 13.5–17.5)
IMM GRANULOCYTES # BLD AUTO: 0.26 10*3/UL
IMM GRANULOCYTES NFR BLD AUTO: 4 %
LH SERPL-ACNC: 2.83 MIU/ML
LYMPHOCYTES # BLD AUTO: 1.7 X10(3)/MCL (ref 0.6–4.6)
LYMPHOCYTES NFR BLD AUTO: 23 %
MCH RBC QN AUTO: 29.9 PG (ref 26–34)
MCHC RBC AUTO-ENTMCNC: 31.6 GM/DL (ref 31–37)
MCV RBC AUTO: 94.6 FL (ref 80–100)
MONOCYTES # BLD AUTO: 0.6 X10(3)/MCL (ref 0.1–1.3)
MONOCYTES NFR BLD AUTO: 8 %
NEUTROPHILS # BLD AUTO: 4.79 X10(3)/MCL (ref 2.1–9.2)
NEUTROPHILS NFR BLD AUTO: 64 %
PLATELET # BLD AUTO: 194 X10(3)/MCL (ref 130–400)
PMV BLD AUTO: 10.1 FL (ref 7.4–10.4)
POTASSIUM SERPL-SCNC: 3.4 MMOL/L (ref 3.5–5.1)
PROLACTIN LEVEL (OHS): 12.86 NG/ML (ref 3.46–19.4)
PROT SERPL-MCNC: 5.9 GM/DL (ref 5.8–7.6)
RBC # BLD AUTO: 3.91 X10(6)/MCL (ref 4.5–5.9)
SODIUM SERPL-SCNC: 142 MMOL/L (ref 136–145)
WBC # SPEC AUTO: 7.5 X10(3)/MCL (ref 4.5–11)

## 2021-08-31 ENCOUNTER — HISTORICAL (OUTPATIENT)
Dept: RESPIRATORY THERAPY | Facility: HOSPITAL | Age: 61
End: 2021-08-31

## 2021-09-13 ENCOUNTER — HISTORICAL (OUTPATIENT)
Dept: ADMINISTRATIVE | Facility: HOSPITAL | Age: 61
End: 2021-09-13

## 2021-09-13 LAB
ABS NEUT (OLG): 10.06 X10(3)/MCL (ref 2.1–9.2)
ALBUMIN SERPL-MCNC: 3.8 GM/DL (ref 3.4–4.8)
ALBUMIN/GLOB SERPL: 1.2 RATIO (ref 1.1–2)
ALP SERPL-CCNC: 41 UNIT/L (ref 40–150)
ALT SERPL-CCNC: 22 UNIT/L (ref 0–55)
AST SERPL-CCNC: 13 UNIT/L (ref 5–34)
BASOPHILS # BLD AUTO: 0 X10(3)/MCL (ref 0–0.2)
BASOPHILS NFR BLD AUTO: 0 %
BILIRUB SERPL-MCNC: 0.6 MG/DL
BILIRUBIN DIRECT+TOT PNL SERPL-MCNC: 0.3 MG/DL (ref 0–0.5)
BILIRUBIN DIRECT+TOT PNL SERPL-MCNC: 0.3 MG/DL (ref 0–0.8)
BUN SERPL-MCNC: 22.4 MG/DL (ref 8.4–25.7)
CALCIUM SERPL-MCNC: 9.6 MG/DL (ref 8.8–10)
CHLORIDE SERPL-SCNC: 96 MMOL/L (ref 98–107)
CK SERPL-CCNC: 86 U/L (ref 30–200)
CO2 SERPL-SCNC: 28 MMOL/L (ref 23–31)
CREAT SERPL-MCNC: 1.05 MG/DL (ref 0.73–1.18)
CRP SERPL-MCNC: <0.4 MG/DL
ERYTHROCYTE [DISTWIDTH] IN BLOOD BY AUTOMATED COUNT: 13.9 % (ref 11.5–14.5)
ERYTHROCYTE [SEDIMENTATION RATE] IN BLOOD: 13 MM/HR (ref 0–15)
GLOBULIN SER-MCNC: 3.3 GM/DL (ref 2.4–3.5)
GLUCOSE SERPL-MCNC: 107 MG/DL (ref 82–115)
HCT VFR BLD AUTO: 43.5 % (ref 40–51)
HGB BLD-MCNC: 14.8 GM/DL (ref 13.5–17.5)
IMM GRANULOCYTES # BLD AUTO: 0.3 10*3/UL
IMM GRANULOCYTES NFR BLD AUTO: 2 %
LYMPHOCYTES # BLD AUTO: 1 X10(3)/MCL (ref 0.6–4.6)
LYMPHOCYTES NFR BLD AUTO: 8 %
MCH RBC QN AUTO: 31.4 PG (ref 26–34)
MCHC RBC AUTO-ENTMCNC: 34 GM/DL (ref 31–37)
MCV RBC AUTO: 92.2 FL (ref 80–100)
MONOCYTES # BLD AUTO: 0.9 X10(3)/MCL (ref 0.1–1.3)
MONOCYTES NFR BLD AUTO: 7 %
NEUTROPHILS # BLD AUTO: 10.06 X10(3)/MCL (ref 2.1–9.2)
NEUTROPHILS NFR BLD AUTO: 82 %
NRBC BLD AUTO-RTO: 0 % (ref 0–0.2)
PLATELET # BLD AUTO: 247 X10(3)/MCL (ref 130–400)
PMV BLD AUTO: 10.2 FL (ref 7.4–10.4)
POTASSIUM SERPL-SCNC: 3.7 MMOL/L (ref 3.5–5.1)
PROT SERPL-MCNC: 7.1 GM/DL (ref 5.8–7.6)
RBC # BLD AUTO: 4.72 X10(6)/MCL (ref 4.5–5.9)
SODIUM SERPL-SCNC: 136 MMOL/L (ref 136–145)
WBC # SPEC AUTO: 12.2 X10(3)/MCL (ref 4.5–11)

## 2021-10-05 ENCOUNTER — HISTORICAL (OUTPATIENT)
Dept: RADIOLOGY | Facility: HOSPITAL | Age: 61
End: 2021-10-05

## 2021-10-07 ENCOUNTER — HISTORICAL (OUTPATIENT)
Dept: RADIOLOGY | Facility: HOSPITAL | Age: 61
End: 2021-10-07

## 2021-11-10 ENCOUNTER — HISTORICAL (OUTPATIENT)
Dept: ADMINISTRATIVE | Facility: HOSPITAL | Age: 61
End: 2021-11-10

## 2021-11-12 ENCOUNTER — HISTORICAL (OUTPATIENT)
Dept: RADIOLOGY | Facility: HOSPITAL | Age: 61
End: 2021-11-12

## 2021-12-06 ENCOUNTER — HISTORICAL (OUTPATIENT)
Dept: ADMINISTRATIVE | Facility: HOSPITAL | Age: 61
End: 2021-12-06

## 2022-01-10 ENCOUNTER — HISTORICAL (OUTPATIENT)
Dept: ADMINISTRATIVE | Facility: HOSPITAL | Age: 62
End: 2022-01-10

## 2022-01-14 ENCOUNTER — HISTORICAL (OUTPATIENT)
Dept: SURGERY | Facility: HOSPITAL | Age: 62
End: 2022-01-14

## 2022-01-14 LAB — SARS-COV-2 AG RESP QL IA.RAPID: NEGATIVE

## 2022-02-11 ENCOUNTER — HISTORICAL (OUTPATIENT)
Dept: ADMINISTRATIVE | Facility: HOSPITAL | Age: 62
End: 2022-02-11

## 2022-02-23 ENCOUNTER — HISTORICAL (OUTPATIENT)
Dept: ADMINISTRATIVE | Facility: HOSPITAL | Age: 62
End: 2022-02-23

## 2022-03-02 ENCOUNTER — HISTORICAL (OUTPATIENT)
Dept: INFUSION THERAPY | Facility: HOSPITAL | Age: 62
End: 2022-03-02

## 2022-03-08 ENCOUNTER — HISTORICAL (OUTPATIENT)
Dept: ADMINISTRATIVE | Facility: HOSPITAL | Age: 62
End: 2022-03-08

## 2022-03-16 ENCOUNTER — HISTORICAL (OUTPATIENT)
Dept: INFUSION THERAPY | Facility: HOSPITAL | Age: 62
End: 2022-03-16

## 2022-04-06 ENCOUNTER — HISTORICAL (OUTPATIENT)
Dept: ADMINISTRATIVE | Facility: HOSPITAL | Age: 62
End: 2022-04-06

## 2022-04-11 ENCOUNTER — HISTORICAL (OUTPATIENT)
Dept: ADMINISTRATIVE | Facility: HOSPITAL | Age: 62
End: 2022-04-11
Payer: MEDICAID

## 2022-04-29 VITALS
HEIGHT: 74 IN | OXYGEN SATURATION: 96 % | DIASTOLIC BLOOD PRESSURE: 71 MMHG | WEIGHT: 286.63 LBS | SYSTOLIC BLOOD PRESSURE: 90 MMHG | BODY MASS INDEX: 36.78 KG/M2

## 2022-04-30 NOTE — ED PROVIDER NOTES
"   Patient:   Vic Meng             MRN: [id number]            FIN: [id number]               Age:   60 years     Sex:  Male     :  1960   Associated Diagnoses:   Hypoxemia; Pulmonary edema; Bilateral lower extremity edema; Chronic obstructive pulmonary disease (COPD)   Author:   Kimberli VILLASEÑOR, Marco Hanonn      Basic Information   Time seen: Date & time 9/3/2020 18:36:00.   History source: Patient.   Arrival mode: Private vehicle.   History limitation: None.   Additional information: Chief Complaint from Nursing Triage Note : Chief Complaint   9/3/2020 18:16 CDT       Chief Complaint           PT WEARING MASK W REFERRAL FROM [facility] FOR FURTHER EVAL OF  "CARDIAC ENLARGEMENT". PT REPORTS SOB, PRODUCTIVE  COUGH, SORE THROAT AND EDEMA TO RT LOWER LEG. COVID AND STREP REPORT BOTH NEG FROM . EKG OBTAINED.  .      History of Present Illness   The patient presents with difficulty breathing.  Acute symptoms for about 3 weeks of shortness of breath, dyspnea on exertion, mildly productive cough, sore throat, increasing bilateral right greater than left lower extremity edema.  No definite weight gain.  No definite orthopnea.  No hemoptysis.  No known history of heart disease, lung disease, or venous thromboembolic disease.  Long-term smoker, quit 3 weeks ago.  Primary care Dr. Peña, has not had any testing done anytime recently.  Denies fever, chills, sweats, change in sense of smell or taste, nausea, vomiting, diarrhea, or other complaints.  Seen at a local urgent care, found to have bilateral pulmonary edema versus interstitial and infection by chest film, negative strep, negative COVID-19.  Referred to the ER for further evaluation.  Underlying history does include obesity, hypertension, venous insufficiency.  No other acute complaints..        Review of Systems   Constitutional symptoms:  Negative except as documented in HPI, no fever, no chills, no weakness.    Skin symptoms:  Negative except as " documented in HPI, no rash, no breakdown.    Eye symptoms:  Negative except as documented in HPI, no recent vision problems, no pain.    ENMT symptoms:  Negative except as documented in HPI, no ear pain, no sore throat.    Respiratory symptoms:  Shortness of breath, cough, Sputum production: Clear.    Cardiovascular symptoms:  Peripheral edema, no chest pain, no palpitations, no syncope.    Gastrointestinal symptoms:  Negative except as documented in HPI, no abdominal pain, no nausea, no vomiting, no diarrhea.    Genitourinary symptoms:  Negative except as documented in HPI, no dysuria, no hematuria.    Musculoskeletal symptoms:  Negative except as documented in HPI, no Muscle pain, no Joint pain.    Neurologic symptoms:  Negative except as documented in HPI, no altered level of consciousness, no weakness.    Psychiatric symptoms:  Negative except as documented in HPI, no anxiety, no depression.    Endocrine symptoms:  Negative except as documented in HPI, no polyuria, no polydipsia.    Hematologic/Lymphatic symptoms:  Negative except as documented in HPI, bleeding tendency negative, bruising tendency negative.    Allergy/immunologic symptoms:  Negative except as documented in HPI, no recurrent infections, no impaired immunity.              Additional review of systems information: All other systems reviewed and otherwise negative, All systems reviewed as documented in chart.      Health Status   Allergies:    Allergic Reactions (Selected)  No Known Allergies,    Allergies (1) Active Reaction  No Known Allergies None Documented  .   Medications:  (Selected)   Prescriptions  Prescribed  Bactroban 2% Ointment (22.5 gmTube): 1 yan, TOP, TID, # 22 gm, 1 Refill(s), Pharmacy: [facility], 187, cm, Height/Length Dosing, 06/18/20 8:59:00 CDT, 148, kg, Weight Dosing, 06/18/20 8:59:00 CDT  CeraVe topical cream: 1 yan, TOP, BID, PRN PRN for dry skin, # 60 gm, 3 Refill(s), Pharmacy: [facility], 187, cm,  Height/Length Dosing, 06/18/20 8:59:00 CDT, 148, kg, Weight Dosing, 06/18/20 8:59:00 CDT  Lasix 20 mg oral tablet: 20 mg = 1 tab(s), Oral, Daily, # 7 tab(s), 0 Refill(s), Pharmacy: [facility], 187, cm, Height/Length Dosing, 06/18/20 8:59:00 CDT, 148, kg, Weight Dosing, 06/18/20 8:59:00 CDT  cetirizine 10 mg oral tablet: 10 mg = 1 tab(s), Oral, Daily, # 30 tab(s), 5 Refill(s), Pharmacy: [facility], 189, cm, Height/Length Dosing, 10/10/19 7:35:00 CDT, 147, kg, Weight Dosing, 10/10/19 7:35:00 CDT  hydrochlorothiazide-lisinopril 25 mg-20 mg oral tablet: See Instructions, TAKE 1 TABLET BY MOUTH DAILY, # 30 tab(s), 11 Refill(s), Pharmacy: [facility], 187, cm, Height/Length Dosing, 06/18/20 8:59:00 CDT, 148, kg, Weight Dosing, 06/18/20 8:59:00 CDT  triamcinolone 0.5% topical cream: 1 yan, TOP, BID, # 20 gm, 5 Refill(s), Pharmacy: [facility], 187, cm, Height/Length Dosing, 06/18/20 8:59:00 CDT, 148, kg, Weight Dosing, 06/18/20 8:59:00 CDT  Documented Medications  Documented  Aspir 81 oral delayed release tablet: 81 mg = 1 tab(s), Oral, Every other day, # 30 tab(s), 0 Refill(s)  Centrum Silver oral tablet: 1 tab(s), Oral, Daily, # 30 tab(s), 0 Refill(s)  Mucinex 600 mg oral tablet, extended release: 600 mg = 1 tab(s), Oral, q12hr  clindamycin 300 mg oral capsule: 300 mg = 1 cap(s), Oral, q6hr.      Past Medical/ Family/ Social History   Medical history:    Active  HTN (401.9)  Resolved  Rash ([id number]):  Resolved.  Tobacco abuse ([id number]):  Resolved..   Surgical history:    hand..   Family history:    Breast cancer  Mother  Arthritis  Mother  .   Social history:    Social & Psychosocial Habits    Alcohol  04/10/2018  Use: Current    Type: Beer    Frequency: 1-2 times per month    Has alcohol use interfered with work or home life? No    Do you ever drink more than intended? No    Has anyone been hurt or at risk by your drinking? No    Ready to change: No    Concerns about alcohol use in  household: No    11/16/2018  Use: Past    Employment/School  03/08/2017  Status: Employed    Activity level: Moderate physical work    Hazardous equipment operation: No    Work hazards: Loud noises, Vibration    Comment: completed 11 th grade - 07/29/2015 13:47 - Anastasiia Mario LPN    Exercise  04/10/2018  Times per week: 1-2 times/week    Self assessment: Fair condition    Exercise type: Walking    Comment: no excercise - 07/29/2015 13:50 - Anastasiia Mario LPN    Home/Environment  03/08/2017  Lives with: Alone    Living situation: Home/Independent    Alcohol abuse in household: No    Substance abuse in household: No    Smoker in household: Yes    Injuries/Abuse/Neglect in household: No    Feels unsafe at home: No    Safe place to go: Yes    Agency(s)/Others notified: No    Family/Friends available to help: Yes    Concern for family members at home: No    Major illness in household: No    Financial concerns: No    Concerns over TV/Computer/Game use: No    Comment: has 1 child - 03/08/2017 13:Ellis Wheeler LPN [facility]    Nutrition/Health  04/10/2018  Type of diet: Regular    Caffeine intake amount: 3 cups coffee/day, 1 soda QOD    Wants to lose weight: No    Sleeping concerns: No    Feels highly stressed: No    Sexual  04/10/2019  Sexually active: No    What is your current gender identity? (Check all that apply) Identifies as male    Substance Use  08/27/2013 Risk Assessment: Denies Substance Abuse    04/01/2018  Use: Past    Type: Marijuana    Comment: try it as a teenager - 03/08/2017 13:Ellis Wheeler LPN [employer]    11/16/2018  Use: Current    Type: Marijuana    Tobacco  06/18/2020  Use: 10 or more cigarettes (1/    Patient Wants Consult For Cessation Counseling Yes    09/03/2020  Use: Former smoker, quit more    Patient Wants Consult For Cessation Counseling N/A    Comment: QUIT X 3 WKS. - 09/03/2020 18:24 - Danette Sotelo RN    Abuse/Neglect  09/03/2020  SHX Any signs of abuse or  neglect No    Feels unsafe at home: No    Safe place to go: Yes    Spiritual/Cultural  10/10/2019  Nondenominational Preference Orthodox  .   Problem list:    Active Problems (10)  Abscess, dental   Fatigue   Herpes zoster   HTN   Leg pain   Morbid obesity   Sleep disorder, shift work   Snoring   Tobacco user   Venous insufficiency of lower extremity   .      Physical Examination               Vital Signs   Vital Signs   9/3/2020 18:16 CDT       Temperature Oral          36.6 DegC                             Temperature Oral (calculated)             97.88 DegF                             Peripheral Pulse Rate     98 bpm                             Respiratory Rate          18 br/min                             SpO2                      93 %                             Oxygen Therapy            Room air                             Systolic Blood Pressure   115 mmHg                             Diastolic Blood Pressure  79 mmHg  .      Vital Signs (last 24 hrs)_____  Last Charted___________  Temp Oral     36.6 DegC  (SEP 03 18:16)  Heart Rate Peripheral   98 bpm  (SEP 03 18:16)  Resp Rate         18 br/min  (SEP 03 18:16)  SBP      115 mmHg  (SEP 03 18:16)  DBP      79 mmHg  (SEP 03 18:16)  SpO2      93 %  (SEP 03 18:16)  Weight      140 kg  (SEP 03 18:16)  Height      187 cm  (SEP 03 18:16)  BMI      40.04  (SEP 03 18:16)  .   Measurements   9/3/2020 18:16 CDT       Weight Dosing             140 kg                             Weight Measured           140 kg                             Weight Measured and Calculated in Lbs     308.64 lb                             Height/Length Dosing      187 cm                             Height/Length Measured    187 cm                             Body Mass Index Measured  40.04 kg/m2  .   Basic Oxygen Information   9/3/2020 18:16 CDT       SpO2                      93 %                             Oxygen Therapy            Room air  .   General:  Alert, no acute distress, Obese.    Skin:   Warm, dry, normal for ethnicity.    Head:  Normocephalic, atraumatic.    Neck:  Supple, trachea midline, no tenderness.    Eye:  Pupils are equal, round and reactive to light, extraocular movements are intact, normal conjunctiva.    Ears, nose, mouth and throat:  Oral mucosa moist, no pharyngeal erythema or exudate.    Cardiovascular:  Regular rate and rhythm, No murmur, Normal peripheral perfusion, Chronic appearing BLE pitting edema to calves.    Respiratory:  Respirations are non-labored, breath sounds are equal, Symmetrical chest wall expansion, Diffuse fine rales.    Chest wall:  No tenderness, No deformity.    Back:  Nontender, Normal range of motion, Normal alignment.    Musculoskeletal:  Normal ROM, normal strength, no tenderness, no swelling, no deformity.    Gastrointestinal:  Soft, Nontender, Non distended, Normal bowel sounds.    Neurological:  Alert and oriented to person, place, time, and situation, No focal neurological deficit observed, CN II-XII intact, normal motor observed, normal speech observed.    Lymphatics:  No lymphadenopathy.   Psychiatric:  Cooperative, appropriate mood & affect, normal judgment.       Medical Decision Making   Documents reviewed:  Emergency department nurses' notes, emergency department records, prior records.    Orders  Launch Orders   Laboratory:  Urinalysis with Micro UHC (Order): Stat collect, Urine, 9/3/2020 19:15 CDT, Nurse collect  D-Dimer (Order): Stat collect, 9/3/2020 19:15 CDT, Blood, Lab Collect, 9/3/2020 19:15 CDT  Troponin-I (Order): Stat collect, 9/3/2020 19:14 CDT, Blood, Lab Collect, 9/3/2020 19:14 CDT  CPK (Order): Stat collect, 9/3/2020 19:14 CDT, Blood, Lab Collect, 9/3/2020 19:14 CDT  CMP (Order): Stat collect, 9/3/2020 19:14 CDT, Blood, Lab Collect, 9/3/2020 19:14 CDT  CK MB (Order): Stat collect, 9/3/2020 19:14 CDT, Blood, Lab Collect, 9/3/2020 19:14 CDT  CBC w/ Auto Diff (Order): Stat collect, 9/3/2020 19:14 CDT, Blood, Once, Stop date 9/3/2020  19:14 CDT, Lab Collect, 9/3/2020 19:14 CDT  BNP-Pro (Order): Stat collect, 9/3/2020 19:14 CDT, Blood, Lab Collect, 9/3/2020 19:14 CDT  Patient Care:  Saline Lock Insert (Order): 9/3/2020 19:14 CDT  Pulse Oximetry (Order): 9/3/2020 19:14 CDT  Cardiac Monitoring (Order): 9/3/2020 19:14 CDT, Constant Order  Radiology:  XR Chest 2 Views (Order): Stat, 9/3/2020 19:14 CDT, Chest Pain, None, Stretcher, Rad Type, Not Scheduled  Cardiology:  EKG (Order): 9/3/2020 19:14 CDT, Stat, NOW, -1, -1, 9/3/2020 19:14 CDT, Launch Orders   Laboratory:  ABG Adult RT (Order): Stat collect, Arterial Blood, 9/3/2020 19:17 CDT, Once, Stop date 9/3/2020 19:17 CDT, Launch Orders   Consults:  [facility] ED Consult Internal Medicine (Order): 9/3/2020 22:01 CDT, Admit.    Cardiac monitor:  Time 9/3/2020 19:24:00, Rate 100, normal sinus rhythm.    Electrocardiogram:  Time 9/3/2020 18:10:00, rate 101, no ectopy, EP Interp, Sinus tachycardia at 101 bpm, possible old anterior infarction, slightly noisy baseline, no other change..    Results review:  Lab results : Lab View   9/3/2020 21:20 CDT       COVID-19 Rapid            NEGATIVE    9/3/2020 19:28 CDT       Sample ABG                arterial                             Treatment                 room air                             Site                      Radial Rt                             pH Art                    7.46  HI                             pCO2 Art                  42.0 mmHg                             pO2 Art                   62.0 mmHg  LOW                             HCO3 Art                  29.9 mmol/L  HI                             CO2 Totl Art              31.2 mmol/L  HI                             O2 Sat Art                94.8 %                             D base                    5.5  HI                             THB ABG                   13.5 gm/dL                             CO Hgb                    2.2 %  NA                             Met Hgb Art               1.0 %                              O2 Hgb                    91.8 %  LOW                             Allens                    Positive    9/3/2020 19:24 CDT       Sodium Lvl                138 mmol/L                             Potassium Lvl             3.9 mmol/L                             Chloride                  105 mmol/L                             CO2                       29 mmol/L                             Calcium Lvl               8.7 mg/dL                             Glucose Lvl               106 mg/dL                             BUN                       16 mg/dL                             Creatinine                0.70 mg/dL                             eGFR-AA                   >105 mL/min                             eGFR-KHANG                  >105 mL/min                             Bili Total                0.5 mg/dL                             Bili Direct               0.2 mg/dL                             Bili Indirect             0.3 mg/dL                             AST                       31 unit/L                             ALT                       30 unit/L                             Alk Phos                  77 unit/L                             Total Protein             8.0 gm/dL                             Albumin Lvl               2.9 gm/dL  LOW                             Globulin                  5.10 gm/mL  HI                             A/G Ratio                 0.6 ratio  LOW                             NT pro BNP.               71 pg/mL                             Total CK                  426 unit/L  HI                             CK MB                     5.4 ng/mL  HI                             Troponin-I                0.032 ng/mL                             D-Dimer                   2.30 mcg/ml FEU  HI                             WBC                       8.2 x10(3)/mcL                             RBC                       4.49 x10(6)/mcL  LOW                             Hgb                        12.5 gm/dL  LOW                             Hct                       38.6 %  LOW                             Platelet                  282 x10(3)/mcL                             MCV                       86.0 fL                             MCH                       27.8 pg                             MCHC                      32.4 gm/dL                             RDW                       13.9 %                             MPV                       10.1 fL                             Abs Neut                  5.61 x10(3)/mcL                             Neutro Auto               68 %  NA                             Lymph Auto                11 %  NA                             Mono Auto                 8 %  NA                             Eos Auto                  11 %  NA                             Abs Eos                   0.9 x10(3)/mcL                             Basophil Auto             0 %  NA                             Abs Neutro                5.61 x10(3)/mcL                             Abs Lymph                 0.9 x10(3)/mcL                             Abs Mono                  0.7 x10(3)/mcL                             Abs Baso                  0.0 x10(3)/mcL                             IG%                       0 %  NA                             IG#                       0.0300  NA  .   Chest X-Ray:  Time reported 9/3/2020 21:20:00, interpretation by Emergency Physician, Left greater than right basilar infiltrates and effusions.    Radiology results:  Rad Results (ST)  < 12 hrs   Accession: [id number]  Order: CT Angio Chest PE W Contrast  Report Dt/Tm: 09/03/2020 21:32  Report:   CTA chest     INDICATION: Cough, shortness of breath     TECHNIQUE: Routine CT angiogram of the chest was performed with  intravenous contrast.     3-D MIP images were obtained     Automatic exposure control was utilized to reduce patient's radiation  dose.     Total DLP: 1108.53 mGy.cm     FINDINGS:  There is trace left pleural effusion. The heart is upper  limits for normal for size. No pericardial effusion. There is mild  bilateral hilar adenopathy. There is 1.2 cm axis diameter left hilar  lymph nodes noted. There is 1.2 cm short axis diameter right hilar  lymph nodes noted. There is borderline prevascular node measuring 1 cm  in short axis diameter. 1.3 cm prevascular node noted. No intraluminal  filling defects within the pulmonary arteries to suggest pulmonary  embolus. There are subpleural reticular interstitial opacities with  the areas of honeycombing noted throughout both lungs. There is mild  right middle lobe bronchiectasis seen. There are areas of interlobular  septal thickening in the lower lungs. There is mild upper lobe  predominant emphysematous changes. There is elongation of the trachea  as can be seen with COPD. There is no confluent airspace disease or  consolidation.     No osseous destructive process evident.     IMPRESSION: No CT evidence for pulmonary embolus.     Findings suspicious for chronic interstitial lung disease with  subpleural reticular opacities, mild honeycombing, right middle lobe  bronchiectasis and scarring evident. No confluent airspace disease.  Elongation of AP dimension of the trachea as can be seen with COPD.  Mild upper lobe predominant emphysematous changes.     Trace left pleural effusion.     Nonspecific bilateral hilar adenopathy and mediastinal adenopathy    .      Impression and Plan   Diagnosis   Hypoxemia (BGC11-QX R09.02)   Bilateral lower extremity edema (UYT91-DL R60.0)   Chronic obstructive pulmonary disease (COPD) (DCE52-FN J44.9)      Calls-Consults   -  9/3/2020 22:00:00 , Int. Med. - to see and admit.    Plan   Disposition: Admit time  9/3/2020 22:00:00, Place in Observation Telemetry Unit, Int. Med..

## 2022-04-30 NOTE — H&P
"   Patient:   Vic Meng             MRN: [id number]            FIN: [id number]               Age:   60 years     Sex:  Male     :  1960   Associated Diagnoses:   None   Author:   Celso VILLASEÑOR, Blayne Baig      Basic Information   Source of history:  Self.    Referral source:  Emergency department.    History limitation:  None.       Chief Complaint   9/3/2020 18:16 CDT       PT WEARING MASK W REFERRAL FROM [facility] FOR FURTHER EVAL OF  "CARDIAC ENLARGEMENT". PT REPORTS SOB, PRODUCTIVE  COUGH, SORE THROAT AND EDEMA TO RT LOWER LEG. COVID AND STREP REPORT BOTH NEG FROM . EKG OBTAINED.      History of Present Illness   60-year-old male with history of hypertension, morbid obesity, 50-pack-year history of smoking, and allergic rhinitis presents with shortness of breath on exertion and bilateral LE swelling x2-3 months.  Patient states he has had shortness of breath on exertion, doing any kind of work or long distance walking for 2 to 3 months.  Has no dyspnea at rest.  He admits to 23 pillow orthopnea.  Patient does have a chronic cough which is productive of clear/white sputum which has not changed in character since he quit smoking 3 weeks ago.  He has associated swelling of BLE, which is constant and does not change with rest or elevation.  Denies any history of COPD or CHF and is not taking prescribed Lasix.  Patient admits to odynophagia x4 days.  Denies headache, weakness, fever, chills, rhinorrhea, wheezing, chest pain, palpitations, increased salt intake, oliguria, or calf pain.  Patient presented to [facility] urgent care on the evening of 9/3/2020 for odynophagia.  While there, CXR appeared to show signs of fluid overload and Pt was sent to [facility] ED for further evaluation.    ED course: In the ED, vital signs were stable, patient afebrile and satting mid 90s on room air.  CMP unremarkable.  BNP of 71.  CK-MB elevated at 5.4, troponin x1 neg. CXR obtained in ED revealed perihilar congestion " and likely left pleural effusion.  D-dimer was elevated at 2.3 and CT PE was obtained showing no signs of pulmonary embolus.  CT did, however, reveal trace left pleural effusion, emphysematous changes, and nonspecific bilateral hilar adenopathy and mediastinal adenopathy.  He had a mild normocytic anemia with H/H of 12.5/38.6.  ABG revealed metabolic alkalosis without respiratory compensation and hypoxemia.  Rapid COVID-19 test was negative.  Internal medicine was consulted for admission for hypoxemia, BLE edema, and COPD.      PMHx: HTN, morbid obesity, allergic rhinitis, venous insufficiency  PSHx: Denies  Current home medications: HCTZlisinopril 25 mg - 20 mg daily, aspirin 81 mg daily  Allergies: NKDA  FHx: Motherbreast cancer; father from lung cancer, history of alcoholism  SH: 50-pack-year history of smoking, quit 3 weeks ago; 4 servings of beer on the weekend; denies illicit drug           Review of Systems   Constitutional:  No fever, No chills, No weakness.    Eye:  No recent visual problem, No visual disturbances.    Ear/Nose/Mouth/Throat:  As per HPI.    Respiratory:  As per HPI.    Cardiovascular:  No chest pain, No palpitations, No peripheral edema.    Gastrointestinal:  No nausea, No vomiting, No diarrhea, No constipation.    Genitourinary:  No dysuria.    Hematology/Lymphatics:  No bruising tendency, No bleeding tendency.    Immunologic:  No recurrent infections.    Musculoskeletal:  No joint pain, No trauma.    Integumentary:  Rash, No breakdown.    Neurologic:  No confusion, No numbness, No tingling, No headache.    Psychiatric:  No anxiety, No depression.       Physical Examination      Vital Signs (last 24 hrs)_____  Last Charted___________  Temp Oral     36.9 DegC  (SEP 03 23:33)  Heart Rate Peripheral   96 bpm  (SEP 03 18:36)  Resp Rate         18 br/min  (SEP 03 18:36)  SBP      117 mmHg  (SEP 03 23:33)  DBP      84 mmHg  (SEP 03 23:33)  SpO2      96 %  (SEP 03  23:33)  Weight      140 kg  (SEP 03 18:16)  Height      187 cm  (SEP 03 18:16)  BMI      40.04  (SEP 03 18:16)     General:  Pleasant, obese male seated on chair in ED room, NAD.    Eye:  Extraocular movements are intact, Normal conjunctiva.    HENT:  Normocephalic, Normal hearing, Oral mucosa is moist.    Neck:  Supple, No jugular venous distention.    Respiratory:  Respirations are non-labored, Breath sounds are equal, Symmetrical chest wall expansion, Mild crackles throughout, no wheezing.    Cardiovascular:  Normal rate, Regular rhythm, No murmur, No gallop, Good pulses equal in all extremities, 2 to 3+ LLE pretibial edema; nonpitting edema to right ankle.    Gastrointestinal:  Soft, Non-tender, Non-distended, Normal bowel sounds.    Musculoskeletal:  No deformity.    Integumentary:  Warm, Dry, Psoriatic appearing plaques overlying MCPs of bilateral hands, plaque overlying right palm, similar plaque overlying b/l elbows and to right calf/ankle/foot.    Neurologic:  Alert, Oriented.    Psychiatric:  Cooperative.       Review / Management   Laboratory Results   Today's Lab Results : PowerNote Discrete Results   9/3/2020 19:28 CDT       Sample ABG                arterial                             Treatment                 room air                             Site                      Radial Rt                             pH Art                    7.46  HI                             pCO2 Art                  42.0 mmHg                             pO2 Art                   62.0 mmHg  LOW                             HCO3 Art                  29.9 mmol/L  HI                             CO2 Totl Art              31.2 mmol/L  HI                             O2 Sat Art                94.8 %                             D base                    5.5  HI                             THB ABG                   13.5 gm/dL                             CO Hgb                    2.2 %  NA                             Met Hgb Art                1.0 %                             O2 Hgb                    91.8 %  LOW                             Allens                    Positive    9/3/2020 19:24 CDT       WBC                       8.2 x10(3)/mcL                             RBC                       4.49 x10(6)/mcL  LOW                             Hgb                       12.5 gm/dL  LOW                             Hct                       38.6 %  LOW                             Platelet                  282 x10(3)/mcL                             MCV                       86.0 fL                             MCH                       27.8 pg                             MCHC                      32.4 gm/dL                             RDW                       13.9 %                             MPV                       10.1 fL                             Abs Neut                  5.61 x10(3)/mcL                             Neutro Auto               68 %  NA                             Lymph Auto                11 %  NA                             Mono Auto                 8 %  NA                             Eos Auto                  11 %  NA                             Abs Eos                   0.9 x10(3)/mcL                             Basophil Auto             0 %  NA                             Abs Neutro                5.61 x10(3)/mcL                             Abs Lymph                 0.9 x10(3)/mcL                             Abs Mono                  0.7 x10(3)/mcL                             Abs Baso                  0.0 x10(3)/mcL                             IG%                       0 %  NA                             IG#                       0.0300  NA                             D-Dimer                   2.30 mcg/ml FEU  HI                             Sodium Lvl                138 mmol/L                             Potassium Lvl             3.9 mmol/L                             Chloride                  105 mmol/L                              CO2                       29 mmol/L                             Calcium Lvl               8.7 mg/dL                             Glucose Lvl               106 mg/dL                             BUN                       16 mg/dL                             Creatinine                0.70 mg/dL                             eGFR-AA                   >105 mL/min                             eGFR-KHANG                  >105 mL/min                             Bili Total                0.5 mg/dL                             Bili Direct               0.2 mg/dL                             Bili Indirect             0.3 mg/dL                             AST                       31 unit/L                             ALT                       30 unit/L                             Alk Phos                  77 unit/L                             Total Protein             8.0 gm/dL                             Albumin Lvl               2.9 gm/dL  LOW                             Globulin                  5.10 gm/mL  HI                             A/G Ratio                 0.6 ratio  LOW                             NT pro BNP.               71 pg/mL                             Total CK                  426 unit/L  HI                             CK MB                     5.4 ng/mL  HI                             Troponin-I                0.032 ng/mL        Radiology results   Rad Results (ST)   Accession: [id number]  Order: CT Angio Chest PE W Contrast  Report Dt/Tm: 09/03/2020 21:32  Report:   CTA chest     INDICATION: Cough, shortness of breath     TECHNIQUE: Routine CT angiogram of the chest was performed with  intravenous contrast.     3-D MIP images were obtained     Automatic exposure control was utilized to reduce patient's radiation  dose.     Total DLP: 1108.53 mGy.cm     FINDINGS: There is trace left pleural effusion. The heart is upper  limits for normal for size. No pericardial effusion. There is mild  bilateral hilar  adenopathy. There is 1.2 cm axis diameter left hilar  lymph nodes noted. There is 1.2 cm short axis diameter right hilar  lymph nodes noted. There is borderline prevascular node measuring 1 cm  in short axis diameter. 1.3 cm prevascular node noted. No intraluminal  filling defects within the pulmonary arteries to suggest pulmonary  embolus. There are subpleural reticular interstitial opacities with  the areas of honeycombing noted throughout both lungs. There is mild  right middle lobe bronchiectasis seen. There are areas of interlobular  septal thickening in the lower lungs. There is mild upper lobe  predominant emphysematous changes. There is elongation of the trachea  as can be seen with COPD. There is no confluent airspace disease or  consolidation.     No osseous destructive process evident.     IMPRESSION: No CT evidence for pulmonary embolus.     Findings suspicious for chronic interstitial lung disease with  subpleural reticular opacities, mild honeycombing, right middle lobe  bronchiectasis and scarring evident. No confluent airspace disease.  Elongation of AP dimension of the trachea as can be seen with COPD.  Mild upper lobe predominant emphysematous changes.     Trace left pleural effusion.     Nonspecific bilateral hilar adenopathy and mediastinal adenopathy            Impression and Plan   1.  Dyspnea  -Secondary to mixed picture of CHF and COPD  -Dyspnea on exertion, two-pillow orthopnea, and BLE edema  -Chronic cough with sputum production, 50-pack-year history of smoking  -Diffuse crackles on exam  -CXR with perihilar congestion and likely left pleural effusion on my interpretation  -Admission EKG revealed sinus tach  -CTPE protocol negative for PE, reveals left pleural effusion, emphysematous changes, and nonspecific bilateral hilar adenopathy and mediastinal adenopathy  -Pro-BNP of 74  -NYHA class II  -TTE in a.m.  -Cardiology consult in a.m.  -Will start Lasix 20 mg IV daily  -Strict I's and O's  and daily weights  -Continue home HCTZ-lisinopril  -Consider addition of beta-blocker upon improvement of fluid overload  -N.p.o., consider Lexiscan in a.m.  -ABG as above reveals hypoxemia with PaO2 of 60 mmHg  -Continuous pulse ox  -Keep SPO2 88-92%  -DuoNebs PRN  -Will hold off starting antibiotic as no change in sputum production  -Will need PFTs as outpatient    2.  Extensive plaques to extensor surfaces  -Appears to be psoriatic  -Consider XR bilateral hands and rheumatologic work-up to rule out RA    3.  Normocytic anemia  -Stable  -Iron panel and ferritin ordered    4.  Allergic rhinitis  -Restart cetirizine daily      PPx: SCDs and Lovenox  IVF: None  Abx: None  Disposition: Admit to remote telemetry.  TTE in a.m., n.p.o. for possible Lexiscan.  Diuresis with IV Lasix.  Likely stable for discharge in a.m.      CODE STATUS: Full code

## 2022-05-05 NOTE — HISTORICAL [facility] CERNER
This is a historical note converted from [employer]. Formatting and pictures may have been removed.  Please reference [employer] for original formatting and attached multimedia. Admit and Discharge Dates  Admit Date: 09/03/2020  Discharge Date: 09/05/2020  Physicians  Attending Physician - Florencio VILLASEÑOR, Dashawn  Admitting Physician - Florencio VILLASEÑOR, Dashawn  Primary Care Physician - Tanner VILLASEÑOR, Basil RHODES  Primary Care Physician - Mariana VILLASEÑOR, Cherry  Discharge Diagnosis  Unquantified COPD exacerbation  Hypoxemic respiratory failure requiring 2L NC secondary to above  chronic lower extremity venous insufficiency  Hypertension  Surgical Procedures  No procedures recorded for this visit.  Immunizations  No immunizations recorded for this visit.  Admission Information  60-year-old male with history of hypertension, morbid obesity, 50-pack-year history of smoking, and allergic rhinitis presents with shortness of breath on exertion and bilateral LE swelling x2-3 months. ?Upon admission the patient states he has had shortness of breath on exertion, doing any kind of work or long distance walking for 2 to 3 months. ?Has no dyspnea at rest. ?He admits to 2-3 pillow orthopnea. ?Patient does have a chronic cough which is productive of clear/white sputum which has not changed in character since he quit smoking 3 weeks ago. ?He has associated swelling of BLE, which is constant and does not change with rest or elevation. ?Denies any history of COPD or CHF and is not taking prescribed Lasix. ?Patient admits to odynophagia x4 days. ?Denies headache, weakness, fever, chills, rhinorrhea, wheezing, chest pain, palpitations, increased salt intake, oliguria, or calf pain. ?Patient presented to [facility] urgent care on the evening of 9/3/2020 for odynophagia. ?While there, CXR appeared to show signs of fluid overload and Pt was sent to [facility] ED for further evaluation.  ?  ED course:?In the ED, vital signs were stable, patient afebrile and satting mid 90s  on room air. ?CMP unremarkable. ?BNP of 71. ?CK-MB elevated at 5.4, troponin x1 neg. CXR obtained in ED revealed perihilar congestion and likely left pleural effusion. ?D-dimer was elevated at 2.3 and CT PE was obtained showing no signs of pulmonary embolus. ?CT did, however, reveal trace left pleural effusion, emphysematous changes, and nonspecific bilateral hilar adenopathy and mediastinal adenopathy. ?He had a mild normocytic anemia with H/H of 12.5/38.6. ?ABG revealed metabolic alkalosis without respiratory compensation and hypoxemia. ?Rapid COVID-19 test was negative. ?Internal medicine was consulted for admission for hypoxemia requiring supplemental oxygen, BLE edema with skin changes, and unquantified COPD workup, he was admitted the morning of 9/4/20.  Hospital Course  In the hospital he was started on ipatropium albuterol treatments q4hrs.? Due to the CT showing bronchiectasis and pleural effusions, he was started on antibiotics and steroids.? He was requiring about 2L of oxygen during his first day, ambulatory saturations showed he was desatting down the low 80s.? A workup for bronchiectasis was initaited, including A1AT deficiency, ABPA, and IgG subtypes for rheumatologic etiology.? wound care was also consulted for management of bilateral chronic lower extremity venous insufficiency.? Overnight his saturations improved and the next morning, he was able to be weaned off of oxygen.? An echocardiogram during this admission showed some pulmonary hypertension but an EF?of 50%.? He was discharged with a plan to workup new onset COPD exacerbation, given his 50 pack year history.? Plan was for follow up in pulmonology clinic, with planned PFTS, sleep study, and he was prescribed advair for symptomatic relief with a plan to [place] in 3 weeks on 9/21/20.? O2 saturations on the day of discharge were appropriate, he was on room air satting 92% with ambulation.? He is in agreement with this plan.  Time Spent on  discharge  >35 minutes  Objective  Vitals & Measurements  T:?36.3? ?C (Oral)? TMIN:?36.3? ?C (Oral)? TMAX:?37.2? ?C (Oral)? HR:?92(Peripheral)? RR:?18? BP:?123/75? SpO2:?91%?  Physical Exam  Gen: He is alert and oriented x3. Well developed, well-nourished, NAD.  Head: Normocephalic  Eyes: PERRL, EOMI, no conjunctival injection or pallor  Nose: No nasal discharge  Throat: No pharyngeal inflammation, erythema, discharge, mucous membranes moist  Neck: Supple. No carotid bruits.? No lymphadenopathy or thyromegaly.  Lungs: Clear to auscultation bilaterally  CV: Normal S1 & S2, RRR, no murmurs appreciated  Abdomen: Soft, NT/ND, bowel sounds present.? No hepatosplenomegaly  Ext: No cyanosis/clubbing/1+ pitting edema, chronic skin changes on bilateral ankles more prominent on medial side  Neuro:?CN II-XII grossly intact, strength 5/5 throughout, normal sensation  Skin: No rashes, lesions, ulceration or induration  Patient Discharge Condition  Stable and improved from admission  Discharge Disposition  Discharge to home with advair and 7 days of levaquin 750mg daily  Patient will need follow up in Pulmonary clinic with prior sleep study and PFTs  Echocardiogram shows borderline EF of 50%  Given compression stockings for chronic venous insufficiency  Follow up in internal medicine clinic for post wards follow up   Discharge Medication Reconciliation  Prescribed  cetirizine (cetirizine 10 mg oral tablet)?10 mg, Oral, Daily  Continue  aspirin (aspirin 81 mg oral Delayed Release (EC) tablet)?81 mg, Oral, Daily  fluticasone-salmeterol (Advair HFA 45 mcg-21 mcg/inh inhalation aerosol)?2 puff(s), INH, BID  levoFLOXacin (Levaquin 750 mg oral tablet)?750 mg, Oral, q24hr  Discontinue  cetirizine (cetirizine 10 mg oral tablet)?10 mg, Oral, Daily  clindamycin (clindamycin 300 mg oral capsule)?300 mg, Oral, q6hr  emollients, topical (CeraVe topical cream)?1 yan, TOP, BID, PRN for dry skin  furosemide (Lasix 20 mg oral tablet)?20 mg, Oral,  Daily  guaiFENesin (Mucinex 600 mg oral tablet, extended release)?600 mg, Oral, q12hr  hydrochlorothiazide-lisinopril (hydrochlorothiazide-lisinopril 25 mg-20 mg oral tablet)?See Instructions  multivitamin with minerals (Centrum Silver oral tablet)?1 tab(s), Oral, Daily  mupirocin topical (Bactroban 2% Ointment (22.5 gmTube))?1 yan, TOP, TID  triamcinolone topical (triamcinolone 0.5% topical cream)?1 yan, TOP, BID  Education and Orders Provided  Bronchiectasis  Chronic Obstructive Pulmonary Disease, Easy-to-Read  Sleep Apnea  Discharge - 09/05/20 11:52:00 CDT, Home, OK to discharge after patient receives compression stockings, lab draws, and transportation?  Follow up  Mert Gibbons  Follow-up with PCP in 3 to 5 days  Follow up with pulmonology as scheduled  Report to Emergency Department if symptoms return or worsen  Car Seat Challenge  No Qualifying Data      I was present with the Resident during discharge day management.  ?  [ x?] I discussed the case with the Resident and agree with the discharge plan as above.  [ ?] I discussed the case with the Resident and agree with the discharge plan as above?except:  ?  Time spent on discharge [ >30?] minutes  ?

## 2022-05-05 NOTE — HISTORICAL [facility] CERNER
This is a historical note converted from [employer]. Formatting and pictures may have been removed.  Please reference [employer] for original formatting and attached multimedia. History of Present Illness  11/9/20: 60yoM referred for sore throat. Pt c/o intermittent pain in throat more days that not for ~6mo. Intermittent dysphagia and odynophagia. Denies otalgia. Former smoker 1ppd for >40 years, quit 4 months ago.?Denies unintentional weight loss, trying to lose weight on a diet prescribed by PCP.?Occasional nasal congestion, but denies rhinorrhea or PND. Has to cough up a lot of mucus in the morning upon waking. Denies GERD or dysphonia. Denies tonsillitis. Reports pain does improve when he is on abx, but then?returns. Currently hospitalized for suspected pneumonia.??  ?   2/4/21: Here in person for follow up of the above symptoms. Has not smoked for >6 months at this point. His intermittent dysphagia and odynophagia is improving, as well as his cough. He is still trying to lose weight and so far mostly successful - not losing weight too rapidly or at a worrisome rate. Occasional nasal congestion still but improved now that on flonase. Denies any neck masses, SOB, voice changes, otalgia. Does endorse frequent heartburn lately.  ?   8/13/21: No longer having sore throat or dysphagia. States he has a good appetite. Continues to abstain from smoking. Endorses compliance with flonase and PPI. Only c/o is recent arthritis flare with pain in his fingers and toes.  ?   9/24/21: Referred back for 2cm right thyroid nodule meeting FNA criteria. Denies dysphagia or dysphonia. Endorses SOB when supine. Sister has thyroid dz controlled with medication but no family hx of thyroid cancer. Denies radiation exposure.  ?   10/28/21: Pt presents to clinic for followup of FNA of 2cm right thyroid nodule. Patient also endorses hearing loss in right ear along with wax build up. Has been going on for 2 months, uses q-tips, has tried drops. No  other complaints today  ?   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  ?   1/14/22: Presents this AM for surgery. No changes to health. Will get COVID test this AM.  Review of Systems  CONSTITUTIONAL: (-) fevers, chills,? night sweats, weight loss, fatigue, anorexia  EYES: (-) changes in vision, blurry vision, diplopia, wears glasses, runny eyes  ENT: (+) as per HPI  CARDIOVASCULAR: (-) CP, SOB, palpitations, orthopnea, claudications, varicosities  RESPIRATORY:?(-) SOB, RASMUSSEN, cough, chest congestion  GASTROINTESTINAL: (-) N/V, D/C, hematochezia, melena, hematemesis  GENITOURINARY: (-) dysuria, hematuria,?discharge, odor  MUSCULOSKELETAL: (-) ROM restriction, joint pain  SKIN: (-) jaundice, pruritus, change in skin, hair or nails  NEUROLOGIC:?(-) paresthesias, fasciculations, seizures or weakness  PSYCHIATRIC: (-) mood swings, low mood, irrational behavior, SI, HI, hallucinations  ENDOCRINE:(-) heat or cold intolerance, polyuria, polydipsia, change in appetite, weight change  HEMATOLOGICAL:?(-) easy bruising or bleeding  Physical Exam  Vitals & Measurements  See flowsheet  General: AAO NAD, overweight, on NC oxygen  Neuro: CN II - XII grossly intact  Head/ Face: AT NC, symmetric, sensations intact bilaterally  Eye: EOMI PERRLA  Ears: externally normal  AD: normal external ear, EAC obstructed with cerumen?(removed), TM intact, no middle ear effusion, no retractions  AS: normal external ear, EAC patent with cerumen build up (removed), TM intact, no middle ear effusion, no retractions  Nose: bilateral nares patent, midline septum, no rhinorrhea, no external deformity, moderate turbinate hypertrophy  OC/OP: MMM, no intraoral lesions, FOM/BOT soft, no trismus, poor dentition bottom, no dentition top, no uvular deviation  Neck: soft, supple, no LAD, normal ROM, no thyromegaly noted  Respiratory: nonlabored  Assessment/Plan  62yo AAM hx  smoking, HTN, GERD. Newly found 2cm hypoechoic right thyroid nodule s/p FNA suggestive of follicular neoplasm.  -- All questions answered  -- Plan for OR today for R thyroid lobectomy pending COVID result  ?   Kassandra Crawford MD  [employer] [facility] PGY4  ?   Patients history and physical exam were reviewed with the resident. ?Agree with findings and proposed plan.  ?   Mike Heard M.D.   Problem List/Past Medical History  Ongoing  ADELA positive  Antisynthetase syndrome  GERD (gastroesophageal reflux disease)  Herpes zoster  HTN  ILD (interstitial lung disease)  Leg pain  Lymphadenopathy  Morbid obesity  Mouth ulcers  Osteoarthritis of right knee  Overgrown toenails  PND (post-nasal drip)  RA (rheumatoid arthritis)  Rheumatoid arthritis  Sleep disorder, shift work  Snoring  Sore throat  Supraclavicular mass  Thyroid nodule  Type 2 diabetes mellitus  Venous insufficiency of lower extremity  Historical  Abscess, dental  Fatigue  Rash  Tobacco abuse  Procedure/Surgical History  Drainage of Right Thyroid Gland Lobe, Percutaneous Approach, Diagnostic (10/07/2021)  Fine needle aspiration biopsy, including ultrasound guidance; first lesion (10/07/2021)  Ultrasonography of Thyroid Gland (10/07/2021)  Assistance with Respiratory Ventilation, Less than 24 Consecutive Hours, Continuous Positive Airway Pressure (11/08/2020)  Debridement (eg, high pressure waterjet with/without suction, sharp selective debridement with scissors, scalpel and forceps), open wound, (eg, fibrin, devitalized epidermis and/or dermis, exudate, debris, biofilm), including topical application(s), wound (04/01/2018)  Extraction of Left Lower Leg Skin, External Approach (04/01/2018)  laser surgery left leg  right hand surgery   Medications  Inpatient  Ancef, 2 gm= 50 mL, IV, On Call  buffered lidocaine 2% - 0.5 ml syringe, 10 mg= 0.5 mL, Subcutaneous, As Directed  Dextrose 50% and Water (50 mL vial/syringe), 12.5 gm= 25 mL, IV, As Directed, PRN  insulin  lispro 100 units/mL subcutaneous injection, 4-12 units, Subcutaneous, As Directed, PRN  Lactated Ringers 1000ml 1,000 mL, 1000 mL, IV  Home  Actemra ACTPen 162 mg/0.9 mL subcutaneous solution, 162 mg, Subcutaneous, qWeek, 5 refills  Advair HFA 45 mcg-21 mcg/inh inhalation aerosol, See Instructions, 11 refills  albuterol 90 mcg/inh inhalation aerosol, 180 mcg= 2 puff(s), INH, q6hr, PRN, 5 refills  Alcohol swabs, See Instructions, 11 refills  aspirin 81 mg oral Delayed Release (EC) tablet, 81 mg= 1 tab(s), Oral, Daily  Basaglar KwikPen 100 units/mL subcutaneous solution, 40 units, Subcutaneous, Once a day (at bedtime), 5 refills,? ?Not Taking, Completed Rx: Last Dose Date/Time Unknown  Flonase 50 mcg/inh nasal spray, 2 spray(s), Nasal, BID, 5 refills  Furosemide, 20 mg, Oral, Daily, PRN, 6 refills,? ?Not taking: Last Dose Date/Time Unknown  gabapentin 100 mg oral capsule, 100 mg= 1 cap(s), Oral, TID, 3 refills,? ?Not taking: Last Dose Date/Time Unknown  gabapentin 300 mg oral capsule, 300 mg= 1 cap(s), Oral, TID, 5 refills  Heavy Duty Rollator, See Instructions,? ?Not taking  Home oxygen, See Instructions  hydrochlorothiazide-lisinopril 25 mg-20 mg oral tablet, 1 tab(s), Oral, Daily, 3 refills  insulin lispro 100 units/mL injectable solution, See Instructions, 5 refills  Insulin syringes 30-gauge, See Instructions, 11 refills  Insulin syringes 31-gauge, See Instructions, 11 refills  Lasix 20 mg oral tablet, 20 mg= 1 tab(s), Oral, Daily, PRN  metformin 500 mg oral tablet, 500 mg= 1 tab(s), Oral, BID, 3 refills  Misc Prescription, See Instructions  Misc Prescription, See Instructions  MoviPrep oral powder for reconstitution, 240 mL, Oral, Daily,? ?Not Taking, Completed Rx  Ofev 150 mg oral capsule, 150 mg= 1 cap(s), Oral, q12hr, 5 refills  omeprazole 40 mg oral DR capsule, 40 mg= 1 cap(s), Oral, Daily, 5 refills  oxygen, See Instructions  parvinedles, See Instructions, 5 refills  prednisONE 2.5 mg oral tablet, 2.5 mg= 1  tab(s), Oral, Daily  SureComfort 70% topical pad  Test strips and lancets, See Instructions, 6 refills  Test strips and lancets, See Instructions, 3 refills  walker, See Instructions  Allergies  No Known Allergies  Social History  Abuse/Neglect  No, 01/10/2022  No, 01/10/2022  No, 12/06/2021  No, No, Yes, 11/23/2021  Alcohol  Current, Alcohol use interferes with work or home: No. Others hurt by drinking: No. Household alcohol concerns: No., 11/23/2021  Employment/School  Employed, Activity level: Moderate physical work. Operates hazardous equipment: No. Workplace hazards: Loud noises, Vibration., 03/08/2017  Exercise  Financial/Legal Situation  None, 09/10/2020  Home/Environment  Lives with Alone. Living situation: Home/Independent. Alcohol abuse in household: No. Substance abuse in household: No. Smoker in household: Yes. Injuries/Abuse/Neglect in household: No. Feels unsafe at home: No. Safe place to go: Yes. Agency(s)/Others notified: No. Family/Friends available for support: Yes. Concern for family members at home: No. Major illness in household: No. Financial concerns: No. TV/Computer concerns: No., 03/08/2017    Never in , 09/04/2020  Nutrition/Health  Regular, Good, 08/13/2021  Sexual  Sexually active: No. Gender Identity Identifies as male., 04/10/2019  Spiritual/Cultural  Gnosticism, Yes, 08/13/2021  Substance Use - Denies Substance Abuse, 08/27/2013  Current, Marijuana, 1-2 times per week, Drug use interferes with work/home: No. Household substance abuse concerns: No., 11/23/2021  Tobacco  Never (less than 100 in lifetime), No, 01/10/2022  Never (less than 100 in lifetime), No, 12/06/2021  Never (less than 100 in lifetime), N/A, Household tobacco concerns: No., 11/23/2021  Family History  Arthritis: Mother.  Breast cancer: Mother.  Immunizations  Vaccine Date Status Comments   pneumococcal 13-valent conjugate vaccine 11/18/2021 Given    COVID-19 mRNA, LNP-S, PF - Moderna 04/06/2021 Recorded     COVID-19 mRNA, LNP-S, PF - Moderna 03/08/2021 Recorded    influenza virus vaccine, inactivated - Not Given Patient Refuses     influenza virus vaccine, inactivated 11/11/2020 Given    tetanus/diphtheria/pertussis, acel(Tdap) 04/10/2019 Given

## 2022-05-05 NOTE — HISTORICAL [facility] CERNER
This is a historical note converted from [employer]. Formatting and pictures may have been removed.  Please reference [employer] for original formatting and attached multimedia. Indication for Surgery  60 yo M who was referred into clinic for right thyroid nodule.? There is 2.3 cm nodule?was biopsied and returned as follicular neoplasm?potentially Hurthle cell neoplasm.? The risks, benefits and alternatives of?right hemithyroidectomy?with possible completion thyroidectomy?were discussed with the patient and informed consent was obtained.  Preoperative Diagnosis  1. Right thyroid nodul  Postoperative Diagnosis  1. same  Operation  Right hemithyroidectomy  Surgeon(s)  Resident surgeons: Shania Black, Nikki Kong, Kassandra Crawford  Attending surgeon: Mike Heard  Anesthesia  General endotracheal anesthesia  Estimated Blood Loss  50ml  Findings  palpable nodule of right lower pole, right recurrent laryngeal nerve stimulated successfully at the conclusion of the case  Specimen(s)  Right thyroid lobe  Complications  None  Technique  The patient was brought to the operating room and placed in supine position. Anesthesia was induced via endotracheal intubation. ?General endotracheal anesthesia was administered with a NIM tube and the Glide scope was used for intubation. The neck was minimally extended with a small shoulder roll. NiM electrodes were secured in the deltoid muscle. Next a 6cm curvilinear incision was planned on the neck one finger breadth above the clavicle/sternal notch. The neck was then prepped with betadine and draped. A proper time out was completed confirming the correct patient and procedure. Next, the incision was made with a 15 blade through the skin and subcutaneous tissue and platysma. Then subplatysmal flaps were raised superiorly to the hyoid bone and inferiorly to the sternum/clavicle. The strap muscles were divided in the midline raphe. The strap muscles were then dissected off the thyroid lobe  with combinations of kitners and bipolar cautery. The trachea was identified inferiorly. Dissection was carried laterally down to the carotid and the strap muscles retracted laterally. Next, dissection was carried?in the avascular place between the superior pole of the thyroid gland and trachea with?a munion and harmonic scalpel.?A kitner was then placed to retract the thyroid inferiorly. The superior thyroid vessels were dissected out, divided, and cauterized with harmonic scalpel. The thyroid gland was then rolled medially and the recurrent laryngeal nerve was identified and stimulated. The tissue around the recurrent laryngeal nerve was dissected to the point the nerve entered the tracheoesophageal groove. A possible superior parathyroid gland was dissected off the thyroid gland. The thyroid was dissected off the trachea through Berrys ligament. Using the harmonic device the thyroid gland was divided down the isthmus and the thyroid gland passed off for pathology. One large nodule was palpated in the right thyroid. The wound was thoroughly irrigated with saline and hemostasis was achieved with valsalva. The recurrent laryngeal nerve was stimulated and viewed intact. Surgicel was placed over the nerve and the superior pole area. A WANG drain was placed. The strap muscles were reapproximated with 3-0 vicryl in a running fashion. The platysma reapproximated with 3-0 vicryl in a interrupted fashion and the skin closed with 4-0 PDS in a running subcuticular fashion. Steristrips were placed on the incision. The patient was awakened and taken to PACU in stable condition. All counts were correct x2.  was present for mabry portions of the procedure.

## 2022-05-19 RX ORDER — GLUCOSAM/CHON-MSM1/C/MANG/BOSW 500-416.6
TABLET ORAL
COMMUNITY
Start: 2022-04-14 | End: 2022-05-19 | Stop reason: SDUPTHER

## 2022-05-19 RX ORDER — OMEPRAZOLE 40 MG/1
CAPSULE, DELAYED RELEASE ORAL
COMMUNITY
Start: 2022-03-21 | End: 2022-05-19 | Stop reason: SDUPTHER

## 2022-05-19 RX ORDER — GLUCOSAM/CHON-MSM1/C/MANG/BOSW 500-416.6
TABLET ORAL
Qty: 100 EACH | Refills: 3 | Status: ON HOLD | OUTPATIENT
Start: 2022-05-19 | End: 2022-10-04 | Stop reason: HOSPADM

## 2022-05-19 RX ORDER — OMEPRAZOLE 40 MG/1
CAPSULE, DELAYED RELEASE ORAL
Qty: 90 CAPSULE | Refills: 3 | Status: SHIPPED | OUTPATIENT
Start: 2022-05-19 | End: 2023-01-04 | Stop reason: ALTCHOICE

## 2022-08-26 DIAGNOSIS — E11.9 TYPE 2 DIABETES MELLITUS WITHOUT COMPLICATIONS: ICD-10-CM

## 2022-08-26 RX ORDER — CALCIUM CITRATE/VITAMIN D3 200MG-6.25
TABLET ORAL
Qty: 100 STRIP | Refills: 6 | Status: ON HOLD | OUTPATIENT
Start: 2022-08-26 | End: 2022-10-04 | Stop reason: HOSPADM

## 2022-09-12 ENCOUNTER — HOSPITAL ENCOUNTER (EMERGENCY)
Facility: HOSPITAL | Age: 62
Discharge: HOME | End: 2022-09-12
Attending: FAMILY MEDICINE
Payer: MEDICAID

## 2022-09-12 VITALS
WEIGHT: 230 LBS | BODY MASS INDEX: 29.52 KG/M2 | HEIGHT: 74 IN | SYSTOLIC BLOOD PRESSURE: 118 MMHG | RESPIRATION RATE: 18 BRPM | DIASTOLIC BLOOD PRESSURE: 60 MMHG | OXYGEN SATURATION: 97 % | TEMPERATURE: 97 F | HEART RATE: 90 BPM

## 2022-09-12 DIAGNOSIS — R10.84 GENERALIZED ABDOMINAL CRAMPING: ICD-10-CM

## 2022-09-12 DIAGNOSIS — R00.0 TACHYCARDIA: ICD-10-CM

## 2022-09-12 DIAGNOSIS — K63.9 COLON WALL THICKENING: Primary | ICD-10-CM

## 2022-09-12 DIAGNOSIS — R19.7 DIARRHEA, UNSPECIFIED TYPE: ICD-10-CM

## 2022-09-12 DIAGNOSIS — E86.0 DEHYDRATION: ICD-10-CM

## 2022-09-12 LAB
ALBUMIN SERPL-MCNC: 2.7 GM/DL (ref 3.4–4.8)
ALBUMIN/GLOB SERPL: 0.5 RATIO (ref 1.1–2)
ALP SERPL-CCNC: 69 UNIT/L (ref 40–150)
ALT SERPL-CCNC: <5 UNIT/L (ref 0–55)
APTT PPP: 36.9 SECONDS (ref 23.4–33.9)
AST SERPL-CCNC: 9 UNIT/L (ref 5–34)
BASOPHILS # BLD AUTO: 0.06 X10(3)/MCL (ref 0–0.2)
BASOPHILS NFR BLD AUTO: 0.8 %
BILIRUBIN DIRECT+TOT PNL SERPL-MCNC: 0.6 MG/DL
BUN SERPL-MCNC: 26.7 MG/DL (ref 8.4–25.7)
CALCIUM SERPL-MCNC: 9.5 MG/DL (ref 8.8–10)
CHLORIDE SERPL-SCNC: 104 MMOL/L (ref 98–107)
CO2 SERPL-SCNC: 21 MMOL/L (ref 23–31)
CREAT SERPL-MCNC: 1.48 MG/DL (ref 0.73–1.18)
EOSINOPHIL # BLD AUTO: 0.16 X10(3)/MCL (ref 0–0.9)
EOSINOPHIL NFR BLD AUTO: 2 %
ERYTHROCYTE [DISTWIDTH] IN BLOOD BY AUTOMATED COUNT: 14.5 % (ref 11.5–17)
FLUAV AG UPPER RESP QL IA.RAPID: NOT DETECTED
FLUBV AG UPPER RESP QL IA.RAPID: NOT DETECTED
GFR SERPLBLD CREATININE-BSD FMLA CKD-EPI: 53 MLS/MIN/1.73/M2
GLOBULIN SER-MCNC: 5.1 GM/DL (ref 2.4–3.5)
GLUCOSE SERPL-MCNC: 112 MG/DL (ref 82–115)
HCT VFR BLD AUTO: 38.3 % (ref 42–52)
HGB BLD-MCNC: 12.9 GM/DL (ref 14–18)
IMM GRANULOCYTES # BLD AUTO: 0.03 X10(3)/MCL (ref 0–0.04)
IMM GRANULOCYTES NFR BLD AUTO: 0.4 %
INR BLD: 1.24 (ref 2–3)
LACTATE SERPL-SCNC: 1.1 MMOL/L (ref 0.5–2.2)
LIPASE SERPL-CCNC: 8 U/L
LYMPHOCYTES # BLD AUTO: 1.33 X10(3)/MCL (ref 0.6–4.6)
LYMPHOCYTES NFR BLD AUTO: 16.8 %
MAGNESIUM SERPL-MCNC: 2.2 MG/DL (ref 1.6–2.6)
MCH RBC QN AUTO: 29.9 PG (ref 27–31)
MCHC RBC AUTO-ENTMCNC: 33.7 MG/DL (ref 33–36)
MCV RBC AUTO: 88.9 FL (ref 80–94)
MONOCYTES # BLD AUTO: 0.97 X10(3)/MCL (ref 0.1–1.3)
MONOCYTES NFR BLD AUTO: 12.2 %
NEUTROPHILS # BLD AUTO: 5.4 X10(3)/MCL (ref 2.1–9.2)
NEUTROPHILS NFR BLD AUTO: 67.8 %
NRBC BLD AUTO-RTO: 0 %
PLATELET # BLD AUTO: 378 X10(3)/MCL (ref 130–400)
PLATELETS.RETICULATED NFR BLD AUTO: 5.7 % (ref 0.9–11.2)
PMV BLD AUTO: 11.1 FL (ref 7.4–10.4)
POTASSIUM SERPL-SCNC: 4.1 MMOL/L (ref 3.5–5.1)
PROT SERPL-MCNC: 7.8 GM/DL (ref 5.8–7.6)
PROTHROMBIN TIME: 15.4 SECONDS (ref 11.7–14.5)
RBC # BLD AUTO: 4.31 X10(6)/MCL (ref 4.7–6.1)
SARS-COV-2 RNA RESP QL NAA+PROBE: NOT DETECTED
SODIUM SERPL-SCNC: 137 MMOL/L (ref 136–145)
TROPONIN I SERPL-MCNC: <0.01 NG/ML (ref 0–0.04)
TSH SERPL-ACNC: 1.75 UIU/ML (ref 0.35–4.94)
WBC # SPEC AUTO: 7.9 X10(3)/MCL (ref 4.5–11.5)

## 2022-09-12 PROCEDURE — 99285 EMERGENCY DEPT VISIT HI MDM: CPT | Mod: 25

## 2022-09-12 PROCEDURE — 87636 SARSCOV2 & INF A&B AMP PRB: CPT | Performed by: FAMILY MEDICINE

## 2022-09-12 PROCEDURE — 63600175 PHARM REV CODE 636 W HCPCS: Performed by: FAMILY MEDICINE

## 2022-09-12 PROCEDURE — 93010 EKG 12-LEAD: ICD-10-PCS | Mod: ,,, | Performed by: INTERNAL MEDICINE

## 2022-09-12 PROCEDURE — 96360 HYDRATION IV INFUSION INIT: CPT

## 2022-09-12 PROCEDURE — 25000003 PHARM REV CODE 250: Performed by: FAMILY MEDICINE

## 2022-09-12 PROCEDURE — 85730 THROMBOPLASTIN TIME PARTIAL: CPT | Performed by: FAMILY MEDICINE

## 2022-09-12 PROCEDURE — 83690 ASSAY OF LIPASE: CPT | Performed by: FAMILY MEDICINE

## 2022-09-12 PROCEDURE — 85610 PROTHROMBIN TIME: CPT | Performed by: FAMILY MEDICINE

## 2022-09-12 PROCEDURE — 93010 ELECTROCARDIOGRAM REPORT: CPT | Mod: ,,, | Performed by: INTERNAL MEDICINE

## 2022-09-12 PROCEDURE — 83605 ASSAY OF LACTIC ACID: CPT | Performed by: FAMILY MEDICINE

## 2022-09-12 PROCEDURE — 84484 ASSAY OF TROPONIN QUANT: CPT | Performed by: FAMILY MEDICINE

## 2022-09-12 PROCEDURE — 82962 GLUCOSE BLOOD TEST: CPT

## 2022-09-12 PROCEDURE — 25500020 PHARM REV CODE 255: Performed by: FAMILY MEDICINE

## 2022-09-12 PROCEDURE — 83735 ASSAY OF MAGNESIUM: CPT | Performed by: FAMILY MEDICINE

## 2022-09-12 PROCEDURE — 93005 ELECTROCARDIOGRAM TRACING: CPT

## 2022-09-12 PROCEDURE — 84443 ASSAY THYROID STIM HORMONE: CPT | Performed by: FAMILY MEDICINE

## 2022-09-12 PROCEDURE — 36415 COLL VENOUS BLD VENIPUNCTURE: CPT | Performed by: FAMILY MEDICINE

## 2022-09-12 PROCEDURE — 80053 COMPREHEN METABOLIC PANEL: CPT | Performed by: FAMILY MEDICINE

## 2022-09-12 PROCEDURE — 85025 COMPLETE CBC W/AUTO DIFF WBC: CPT | Performed by: FAMILY MEDICINE

## 2022-09-12 RX ORDER — CIPROFLOXACIN 500 MG/1
500 TABLET ORAL 2 TIMES DAILY
Qty: 20 TABLET | Refills: 0 | Status: SHIPPED | OUTPATIENT
Start: 2022-09-12 | End: 2022-09-22

## 2022-09-12 RX ORDER — MAG HYDROX/ALUMINUM HYD/SIMETH 200-200-20
30 SUSPENSION, ORAL (FINAL DOSE FORM) ORAL
Status: COMPLETED | OUTPATIENT
Start: 2022-09-12 | End: 2022-09-12

## 2022-09-12 RX ORDER — METRONIDAZOLE 500 MG/1
500 TABLET ORAL EVERY 12 HOURS
Qty: 20 TABLET | Refills: 0 | Status: SHIPPED | OUTPATIENT
Start: 2022-09-12 | End: 2022-09-22

## 2022-09-12 RX ORDER — LIDOCAINE HYDROCHLORIDE 20 MG/ML
15 SOLUTION OROPHARYNGEAL
Status: COMPLETED | OUTPATIENT
Start: 2022-09-12 | End: 2022-09-12

## 2022-09-12 RX ORDER — DICYCLOMINE HYDROCHLORIDE 20 MG/1
20 TABLET ORAL 3 TIMES DAILY PRN
Qty: 15 TABLET | Refills: 0 | Status: SHIPPED | OUTPATIENT
Start: 2022-09-12 | End: 2023-01-04 | Stop reason: ALTCHOICE

## 2022-09-12 RX ORDER — SODIUM CHLORIDE 9 MG/ML
1000 INJECTION, SOLUTION INTRAVENOUS
Status: COMPLETED | OUTPATIENT
Start: 2022-09-12 | End: 2022-09-12

## 2022-09-12 RX ADMIN — LIDOCAINE HYDROCHLORIDE 15 ML: 20 SOLUTION ORAL; TOPICAL at 11:09

## 2022-09-12 RX ADMIN — IOPAMIDOL 100 ML: 755 INJECTION, SOLUTION INTRAVENOUS at 12:09

## 2022-09-12 RX ADMIN — ALUMINUM HYDROXIDE, MAGNESIUM HYDROXIDE, AND DIMETHICONE 30 ML: 200; 20; 200 SUSPENSION ORAL at 11:09

## 2022-09-12 RX ADMIN — SODIUM CHLORIDE 1000 ML: 9 INJECTION, SOLUTION INTRAVENOUS at 11:09

## 2022-09-12 RX ADMIN — SODIUM CHLORIDE, POTASSIUM CHLORIDE, SODIUM LACTATE AND CALCIUM CHLORIDE 1000 ML: 600; 310; 30; 20 INJECTION, SOLUTION INTRAVENOUS at 12:09

## 2022-09-12 NOTE — ED PROVIDER NOTES
Encounter Date: 9/12/2022       History     Chief Complaint   Patient presents with    Abdominal Pain     Lower abdominal pain with diarrhea that has been intermittent over the last 9 months, pt states decreased appetite      62 year male with history of diabetes and hypertension presents with generalized abdominal cramping and intermittent diarrhea over the past 9 months.  Patient gets diarrhea every 2-3 days.  States he was seen for this previously and prescribed Carafate which was helping but is no longer controlling his abdominal pain.  He denies fever nausea vomiting.  Denies sick contacts or recent travel.  Denies chest pain or shortness of breath.  Patient is followed by PCP Dr. Sarabjit Arreaga.  He was referred to GI Dr. Ibarra and has a follow-up appointment next month for colonoscopy.  Tells me he has had a colonoscopy before in the past and everything was reportedly normal.  No other complaints.    Review of patient's allergies indicates:  No Known Allergies  History reviewed. No pertinent past medical history.  History reviewed. No pertinent surgical history.  History reviewed. No pertinent family history.  Social History     Tobacco Use    Smoking status: Never    Smokeless tobacco: Never     Review of Systems   Constitutional: Negative.    HENT: Negative.     Eyes: Negative.    Respiratory: Negative.     Cardiovascular: Negative.    Gastrointestinal:  Positive for abdominal pain and diarrhea. Negative for blood in stool.   Endocrine: Negative.    Genitourinary: Negative.    Musculoskeletal: Negative.    Skin:  Positive for rash.   Allergic/Immunologic: Negative.    Neurological: Negative.    Hematological: Negative.    Psychiatric/Behavioral: Negative.       Physical Exam     Initial Vitals [09/12/22 1052]   BP Pulse Resp Temp SpO2   (!) 80/64 (!) 130 18 97 °F (36.1 °C) 98 %      MAP       --         Physical Exam    Nursing note and vitals reviewed.  Constitutional: He appears well-developed and  well-nourished.   HENT:   Head: Normocephalic.   Eyes: Conjunctivae are normal. Pupils are equal, round, and reactive to light.   Neck:   Normal range of motion.  Cardiovascular:            Tachycardic   Pulmonary/Chest: Breath sounds normal.   Abdominal: Abdomen is soft. Bowel sounds are normal. He exhibits no distension. There is no abdominal tenderness. There is no rebound.   Musculoskeletal:         General: Normal range of motion.      Cervical back: Normal range of motion.     Neurological: He is alert and oriented to person, place, and time. GCS score is 15. GCS eye subscore is 4. GCS verbal subscore is 5. GCS motor subscore is 6.   Skin: Skin is warm. Capillary refill takes less than 2 seconds.   Psychiatric: He has a normal mood and affect.       ED Course   Procedures  Labs Reviewed   COMPREHENSIVE METABOLIC PANEL - Abnormal; Notable for the following components:       Result Value    Carbon Dioxide 21 (*)     Blood Urea Nitrogen 26.7 (*)     Creatinine 1.48 (*)     Protein Total 7.8 (*)     Albumin Level 2.7 (*)     Globulin 5.1 (*)     Albumin/Globulin Ratio 0.5 (*)     All other components within normal limits   APTT - Abnormal; Notable for the following components:    PTT 36.9 (*)     All other components within normal limits   PROTIME-INR - Abnormal; Notable for the following components:    PT 15.4 (*)     INR 1.24 (*)     All other components within normal limits   CBC WITH DIFFERENTIAL - Abnormal; Notable for the following components:    RBC 4.31 (*)     Hgb 12.9 (*)     Hct 38.3 (*)     MPV 11.1 (*)     All other components within normal limits   COVID/FLU A&B PCR - Normal   LACTIC ACID, PLASMA - Normal   LIPASE - Normal   MAGNESIUM - Normal   TROPONIN I - Normal   TSH - Normal   CBC W/ AUTO DIFFERENTIAL    Narrative:     The following orders were created for panel order CBC auto differential.  Procedure                               Abnormality         Status                     ---------                                -----------         ------                     CBC with Differential[[id number]]        Abnormal            Final result                 Please view results for these tests on the individual orders.   DRUG SCREEN, URINE (BEAKER)   URINALYSIS, REFLEX TO URINE CULTURE     EKG Readings: (Independently Interpreted)   Rhythm: Sinus Tachycardia. Heart Rate: 129. T Waves Flipped: V4, V5 and V6. Axis: Normal. Clinical Impression: Sinus Tachycardia     Imaging Results              CT Abdomen Pelvis With Contrast (Final result)  Result time 09/12/22 12:25:25      Final result by Lise Murray MD (09/12/22 12:25:25)                   Impression:      Subtle area of questionable colonic wall thickening in the ascending colon as described above.  Given the patient's symptomatology endoscopy may be beneficial for correlation.      Electronically signed by: Lise Murray  Date:    09/12/2022  Time:    12:25               Narrative:    EXAMINATION:  CT ABDOMEN PELVIS WITH CONTRAST    CLINICAL HISTORY:  Abdominal pain, acute, nonlocalized;    TECHNIQUE:  Low dose axial images, sagittal and coronal reformations were obtained from the lung bases to the pubic symphysis following the IV administration of contrast. Automatic exposure control (AEC) is utilized to reduce patient radiation exposure.    COMPARISON:  None.    FINDINGS:  There are chronic appearing interstitial lung changes in the lung bases bilaterally.  Findings are stable since 04/06/2022..    The liver appears normal.  No liver mass or lesion is seen.  Portal and hepatic veins appear normal.    The gallbladder appears normal.  No obvious gallstones are seen.  No biliary dilatation is seen.  No pericholecystic fluid is seen.    The pancreas appears normal.  No pancreatic mass or lesion is seen.    The spleen shows no acute abnormality.    The adrenal glands appear normal.  No adrenal nodule is seen.    There are some cysts seen in both  kidneys.  Cysts are stable since prior examination.  Cysts appear to be simple cyst..  No hydronephrosis is seen.  No hydroureter is seen.  No nephrolithiasis is seen.  No obvious ureteral stones are seen.    Urinary bladder appears grossly unremarkable.    No prostate abnormality is seen.    There are some subtle areas of colonic wall thickening seen in the ascending colon best seen on images number 35 through 40.  Findings may represent decompressed colon but if colonic pathology is suspected given the patient's history than colonoscopy may be beneficial.  The remainder of the colon appears unremarkable.  No appendicitis is seen..    No free air is seen.  No free fluid is seen.                                       Medications   lactated ringers bolus 1,000 mL (1,000 mLs Intravenous New Bag 9/12/22 1222)   0.9%  NaCl infusion (0 mLs Intravenous Stopped 9/12/22 1219)   aluminum-magnesium hydroxide-simethicone 200-200-20 mg/5 mL suspension 30 mL (30 mLs Oral Given 9/12/22 1121)     And   LIDOcaine HCl 2% oral solution 15 mL (15 mLs Oral Given 9/12/22 1121)   iopamidoL (ISOVUE-370) injection 100 mL (100 mLs Intravenous Given 9/12/22 1212)     Medical Decision Making:   ED Management:  Patient is nontoxic-appearing in no acute distress.  Vital signs stable.  Benign physical exam.  Workup shows mild dehydration.  Patient was hydrated with 2 L of IV fluids.  He is tolerating p.o. intake.  CT shows mild colonic wall thickening.  Patient is scheduled for colonoscopy next month.  Risks benefits of starting antibiotics prophylactically considering his chronic diarrhea were discussed with the patient.  He would like to start antibiotics.  Patient understands not to drink alcohol on these medications.  Call follow-up with his PCP as soon as possible.  Strict return to ER precautions given, patient voiced understanding.           ED Course as of 09/12/22 1245   Mon Sep 12, 2022   1155 Patient is hungry and requesting to eat.   Drinking water.  Abdominal pain improved after GI cocktail.  Heart rate and BP improving with IV fluids.  CT scan pending. [AG]   1231 Patient was hydrated with 2 L of IV fluids.  Feels much better.  Tolerating p.o. intake.  Unfortunately he was unable to give a stool sample.   [AG]      ED Course User Index  [AG] Anton Flores MD             Clinical Impression:   Final diagnoses:  [R00.0] Tachycardia  [K63.9] Colon wall thickening (Primary)  [R10.84] Generalized abdominal cramping  [R19.7] Diarrhea, unspecified type  [E86.0] Dehydration      ED Disposition Condition    Discharge Stable          ED Prescriptions       Medication Sig Dispense Start Date End Date Auth. Provider    ciprofloxacin HCl (CIPRO) 500 MG tablet Take 1 tablet (500 mg total) by mouth 2 (two) times daily. for 10 days 20 tablet 9/12/2022 9/22/2022 Anton Flores MD    metroNIDAZOLE (FLAGYL) 500 MG tablet Take 1 tablet (500 mg total) by mouth every 12 (twelve) hours. for 10 days 20 tablet 9/12/2022 9/22/2022 Anton Flores MD    dicyclomine (BENTYL) 20 mg tablet Take 1 tablet (20 mg total) by mouth 3 (three) times daily as needed (abd cramping). 15 tablet 9/12/2022 -- Anton Flores MD    zinc oxide 16 % Oint ointment Apply 1 application topically once daily. 113 g 9/12/2022 -- Anton Flores MD          Follow-up Information       Follow up With Specialties Details Why Contact Info    Sarabjit Arreaga MD Internal Medicine   [address] [place] [id number] [phone]      Иван Ibarra MD Gastroenterology   [address] [place] [id number] [phone]               Anton Flores MD  09/12/22 7238

## 2022-09-13 LAB — POCT GLUCOSE: 111 MG/DL (ref 70–110)

## 2022-09-27 ENCOUNTER — HOSPITAL ENCOUNTER (INPATIENT)
Facility: HOSPITAL | Age: 62
LOS: 7 days | Discharge: HOME | DRG: 386 | End: 2022-10-04
Attending: EMERGENCY MEDICINE | Admitting: INTERNAL MEDICINE
Payer: MEDICAID

## 2022-09-27 DIAGNOSIS — I95.9 HYPOTENSION, UNSPECIFIED HYPOTENSION TYPE: ICD-10-CM

## 2022-09-27 DIAGNOSIS — N17.9 AKI (ACUTE KIDNEY INJURY): ICD-10-CM

## 2022-09-27 DIAGNOSIS — K52.9 COLITIS: ICD-10-CM

## 2022-09-27 DIAGNOSIS — R19.7 DIARRHEA, UNSPECIFIED TYPE: ICD-10-CM

## 2022-09-27 DIAGNOSIS — R52 BODY ACHES: ICD-10-CM

## 2022-09-27 DIAGNOSIS — N39.0 ACUTE UTI: ICD-10-CM

## 2022-09-27 DIAGNOSIS — R11.0 NAUSEA: ICD-10-CM

## 2022-09-27 DIAGNOSIS — R10.9 ABDOMINAL PAIN, UNSPECIFIED ABDOMINAL LOCATION: Primary | ICD-10-CM

## 2022-09-27 LAB
ALBUMIN SERPL-MCNC: 2.5 GM/DL (ref 3.4–4.8)
ALBUMIN/GLOB SERPL: 0.5 RATIO (ref 1.1–2)
ALP SERPL-CCNC: 50 UNIT/L (ref 40–150)
ALT SERPL-CCNC: 6 UNIT/L (ref 0–55)
APPEARANCE UR: CLEAR
AST SERPL-CCNC: 12 UNIT/L (ref 5–34)
BACTERIA #/AREA URNS AUTO: ABNORMAL /HPF
BASOPHILS # BLD AUTO: 0.08 X10(3)/MCL (ref 0–0.2)
BASOPHILS NFR BLD AUTO: 0.7 %
BILIRUB UR QL STRIP.AUTO: 1 MG/DL
BILIRUBIN DIRECT+TOT PNL SERPL-MCNC: 0.3 MG/DL
BUN SERPL-MCNC: 47.6 MG/DL (ref 8.4–25.7)
CALCIUM SERPL-MCNC: 8.9 MG/DL (ref 8.8–10)
CHLORIDE SERPL-SCNC: 107 MMOL/L (ref 98–107)
CO2 SERPL-SCNC: 17 MMOL/L (ref 23–31)
COLOR UR AUTO: ABNORMAL
CREAT SERPL-MCNC: 1.93 MG/DL (ref 0.73–1.18)
EOSINOPHIL # BLD AUTO: 0.34 X10(3)/MCL (ref 0–0.9)
EOSINOPHIL NFR BLD AUTO: 3.1 %
ERYTHROCYTE [DISTWIDTH] IN BLOOD BY AUTOMATED COUNT: 14.8 % (ref 11.5–17)
GFR SERPLBLD CREATININE-BSD FMLA CKD-EPI: 39 MLS/MIN/1.73/M2
GLOBULIN SER-MCNC: 4.6 GM/DL (ref 2.4–3.5)
GLUCOSE SERPL-MCNC: 99 MG/DL (ref 82–115)
GLUCOSE UR QL STRIP.AUTO: NEGATIVE MG/DL
HCT VFR BLD AUTO: 34.2 % (ref 42–52)
HGB BLD-MCNC: 11.3 GM/DL (ref 14–18)
IMM GRANULOCYTES # BLD AUTO: 0.07 X10(3)/MCL (ref 0–0.04)
IMM GRANULOCYTES NFR BLD AUTO: 0.6 %
KETONES UR QL STRIP.AUTO: ABNORMAL MG/DL
LACTATE SERPL-SCNC: 1.6 MMOL/L (ref 0.5–2.2)
LEUKOCYTE ESTERASE UR QL STRIP.AUTO: 2 UNIT/L
LIPASE SERPL-CCNC: 20 U/L
LYMPHOCYTES # BLD AUTO: 1.68 X10(3)/MCL (ref 0.6–4.6)
LYMPHOCYTES NFR BLD AUTO: 15.4 %
MAGNESIUM SERPL-MCNC: 2.5 MG/DL (ref 1.6–2.6)
MCH RBC QN AUTO: 29.9 PG (ref 27–31)
MCHC RBC AUTO-ENTMCNC: 33 MG/DL (ref 33–36)
MCV RBC AUTO: 90.5 FL (ref 80–94)
MONOCYTES # BLD AUTO: 1.28 X10(3)/MCL (ref 0.1–1.3)
MONOCYTES NFR BLD AUTO: 11.7 %
NEUTROPHILS # BLD AUTO: 7.5 X10(3)/MCL (ref 2.1–9.2)
NEUTROPHILS NFR BLD AUTO: 68.5 %
NITRITE UR QL STRIP.AUTO: NEGATIVE
NRBC BLD AUTO-RTO: 0 %
PH UR STRIP.AUTO: 5 [PH]
PLATELET # BLD AUTO: 519 X10(3)/MCL (ref 130–400)
PMV BLD AUTO: 10.5 FL (ref 7.4–10.4)
POCT GLUCOSE: 90 MG/DL (ref 70–110)
POTASSIUM SERPL-SCNC: 4.9 MMOL/L (ref 3.5–5.1)
PROT SERPL-MCNC: 7.1 GM/DL (ref 5.8–7.6)
PROT UR QL STRIP.AUTO: 1 MG/DL
RBC # BLD AUTO: 3.78 X10(6)/MCL (ref 4.7–6.1)
RBC #/AREA URNS AUTO: <5 /HPF
RBC UR QL AUTO: ABNORMAL UNIT/L
SODIUM SERPL-SCNC: 133 MMOL/L (ref 136–145)
SP GR UR STRIP.AUTO: 1.02 (ref 1–1.03)
SQUAMOUS #/AREA URNS AUTO: 15 /HPF
UROBILINOGEN UR STRIP-ACNC: 1 MG/DL
WBC # SPEC AUTO: 10.9 X10(3)/MCL (ref 4.5–11.5)
WBC #/AREA URNS AUTO: 15 /HPF

## 2022-09-27 PROCEDURE — 96365 THER/PROPH/DIAG IV INF INIT: CPT

## 2022-09-27 PROCEDURE — 36415 COLL VENOUS BLD VENIPUNCTURE: CPT | Performed by: NURSE PRACTITIONER

## 2022-09-27 PROCEDURE — 83735 ASSAY OF MAGNESIUM: CPT | Performed by: EMERGENCY MEDICINE

## 2022-09-27 PROCEDURE — 83690 ASSAY OF LIPASE: CPT | Performed by: NURSE PRACTITIONER

## 2022-09-27 PROCEDURE — 81001 URINALYSIS AUTO W/SCOPE: CPT | Performed by: NURSE PRACTITIONER

## 2022-09-27 PROCEDURE — 93010 EKG 12-LEAD: ICD-10-PCS | Mod: ,,, | Performed by: INTERNAL MEDICINE

## 2022-09-27 PROCEDURE — 83605 ASSAY OF LACTIC ACID: CPT | Performed by: EMERGENCY MEDICINE

## 2022-09-27 PROCEDURE — 11000001 HC ACUTE MED/SURG PRIVATE ROOM

## 2022-09-27 PROCEDURE — 85025 COMPLETE CBC W/AUTO DIFF WBC: CPT | Performed by: NURSE PRACTITIONER

## 2022-09-27 PROCEDURE — 87040 BLOOD CULTURE FOR BACTERIA: CPT | Performed by: EMERGENCY MEDICINE

## 2022-09-27 PROCEDURE — 63600175 PHARM REV CODE 636 W HCPCS: Performed by: EMERGENCY MEDICINE

## 2022-09-27 PROCEDURE — 93005 ELECTROCARDIOGRAM TRACING: CPT

## 2022-09-27 PROCEDURE — 93010 ELECTROCARDIOGRAM REPORT: CPT | Mod: ,,, | Performed by: INTERNAL MEDICINE

## 2022-09-27 PROCEDURE — 80053 COMPREHEN METABOLIC PANEL: CPT | Performed by: NURSE PRACTITIONER

## 2022-09-27 PROCEDURE — 25000003 PHARM REV CODE 250: Performed by: EMERGENCY MEDICINE

## 2022-09-27 PROCEDURE — 36415 COLL VENOUS BLD VENIPUNCTURE: CPT | Performed by: EMERGENCY MEDICINE

## 2022-09-27 PROCEDURE — 82962 GLUCOSE BLOOD TEST: CPT | Mod: 59

## 2022-09-27 PROCEDURE — 99291 CRITICAL CARE FIRST HOUR: CPT | Mod: 25

## 2022-09-27 RX ORDER — SODIUM CHLORIDE 9 MG/ML
1000 INJECTION, SOLUTION INTRAVENOUS
Status: COMPLETED | OUTPATIENT
Start: 2022-09-27 | End: 2022-09-27

## 2022-09-27 RX ORDER — DIPHENOXYLATE HYDROCHLORIDE AND ATROPINE SULFATE 2.5; .025 MG/1; MG/1
2 TABLET ORAL 3 TIMES DAILY
Status: ON HOLD | COMMUNITY
Start: 2022-06-27 | End: 2022-10-04 | Stop reason: HOSPADM

## 2022-09-27 RX ORDER — FUROSEMIDE 20 MG/1
20 TABLET ORAL DAILY PRN
COMMUNITY
Start: 2022-09-12 | End: 2023-03-21 | Stop reason: ALTCHOICE

## 2022-09-27 RX ORDER — CIPROFLOXACIN 500 MG/1
500 TABLET ORAL
Status: ON HOLD | COMMUNITY
End: 2022-10-04 | Stop reason: HOSPADM

## 2022-09-27 RX ORDER — LISINOPRIL AND HYDROCHLOROTHIAZIDE 20; 25 MG/1; MG/1
1 TABLET ORAL DAILY
COMMUNITY
Start: 2022-09-01

## 2022-09-27 RX ORDER — TRAMADOL HYDROCHLORIDE 50 MG/1
TABLET ORAL
COMMUNITY
Start: 2022-08-11 | End: 2023-01-04 | Stop reason: ALTCHOICE

## 2022-09-27 RX ORDER — METRONIDAZOLE 500 MG/1
500 TABLET ORAL EVERY 12 HOURS
COMMUNITY
End: 2023-01-04 | Stop reason: ALTCHOICE

## 2022-09-27 RX ORDER — ASPIRIN 81 MG/1
81 TABLET ORAL DAILY
COMMUNITY

## 2022-09-27 RX ORDER — FLUTICASONE PROPIONATE AND SALMETEROL XINAFOATE 45; 21 UG/1; UG/1
2 AEROSOL, METERED RESPIRATORY (INHALATION) DAILY
COMMUNITY
Start: 2022-09-01

## 2022-09-27 RX ADMIN — SODIUM CHLORIDE 2000 ML: 9 INJECTION, SOLUTION INTRAVENOUS at 09:09

## 2022-09-27 RX ADMIN — SODIUM CHLORIDE, POTASSIUM CHLORIDE, SODIUM LACTATE AND CALCIUM CHLORIDE 1000 ML: 600; 310; 30; 20 INJECTION, SOLUTION INTRAVENOUS at 11:09

## 2022-09-27 RX ADMIN — SODIUM CHLORIDE 1000 ML: 9 INJECTION, SOLUTION INTRAVENOUS at 09:09

## 2022-09-27 RX ADMIN — PIPERACILLIN AND TAZOBACTAM 4.5 G: 4; .5 INJECTION, POWDER, LYOPHILIZED, FOR SOLUTION INTRAVENOUS; PARENTERAL at 09:09

## 2022-09-27 NOTE — FIRST PROVIDER EVALUATION
Medical screening examination initiated.  I have conducted a focused provider triage encounter, findings are as follows:    Brief history of present illness:  62-year-old male presents ER via EMS complaining of abdominal pain times several months now.  States medications that have been prescribed to him has not relieved his symptoms.    Vitals:    09/27/22 1729   BP: (!) 140/84   Pulse: 110   Resp: 18   Temp: 97.9 °F (36.6 °C)   TempSrc: Temporal   SpO2: 99%       Pertinent physical exam:  AAOx3    Brief workup plan:  labs    Preliminary workup initiated; this workup will be continued and followed by the physician or advanced practice provider that is assigned to the patient when roomed.

## 2022-09-28 LAB
ABS NEUT (OLG): 4.62 X10(3)/MCL (ref 2.1–9.2)
ALBUMIN SERPL-MCNC: 2.2 GM/DL (ref 3.4–4.8)
ALBUMIN/GLOB SERPL: 0.7 RATIO (ref 1.1–2)
ALP SERPL-CCNC: 46 UNIT/L (ref 40–150)
ALT SERPL-CCNC: 6 UNIT/L (ref 0–55)
AST SERPL-CCNC: 9 UNIT/L (ref 5–34)
BASOPHILS NFR BLD MANUAL: 0.07 X10(3)/MCL (ref 0–0.2)
BASOPHILS NFR BLD MANUAL: 1 %
BILIRUBIN DIRECT+TOT PNL SERPL-MCNC: 0.3 MG/DL
BUN SERPL-MCNC: 41.4 MG/DL (ref 8.4–25.7)
BURR CELLS (OLG): ABNORMAL
CALCIUM SERPL-MCNC: 8 MG/DL (ref 8.8–10)
CHLORIDE SERPL-SCNC: 109 MMOL/L (ref 98–107)
CO2 SERPL-SCNC: 17 MMOL/L (ref 23–31)
CREAT SERPL-MCNC: 1.63 MG/DL (ref 0.73–1.18)
EOSINOPHIL NFR BLD MANUAL: 0.35 X10(3)/MCL (ref 0–0.9)
EOSINOPHIL NFR BLD MANUAL: 5 %
ERYTHROCYTE [DISTWIDTH] IN BLOOD BY AUTOMATED COUNT: 15 % (ref 11.5–17)
FECAL LEUKOCYTE (OHS): POSITIVE
GFR SERPLBLD CREATININE-BSD FMLA CKD-EPI: 47 MLS/MIN/1.73/M2
GLOBULIN SER-MCNC: 3.2 GM/DL (ref 2.4–3.5)
GLUCOSE SERPL-MCNC: 81 MG/DL (ref 82–115)
HCT VFR BLD AUTO: 32.2 % (ref 42–52)
HGB BLD-MCNC: 10.3 GM/DL (ref 14–18)
IMM GRANULOCYTES # BLD AUTO: 0.05 X10(3)/MCL (ref 0–0.04)
IMM GRANULOCYTES NFR BLD AUTO: 0.7 %
INSTRUMENT WBC (OLG): 7 X10(3)/MCL
LEUKO NEG CONT (OHS): NEGATIVE
LEUKO POS CONT (OHS): POSITIVE
LYMPHOCYTES NFR BLD MANUAL: 1.19 X10(3)/MCL
LYMPHOCYTES NFR BLD MANUAL: 17 %
MCH RBC QN AUTO: 29.1 PG (ref 27–31)
MCHC RBC AUTO-ENTMCNC: 32 MG/DL (ref 33–36)
MCV RBC AUTO: 91 FL (ref 80–94)
MONOCYTES NFR BLD MANUAL: 0.84 X10(3)/MCL (ref 0.1–1.3)
MONOCYTES NFR BLD MANUAL: 12 %
NEUTROPHILS NFR BLD MANUAL: 66 %
NRBC BLD AUTO-RTO: 0 %
PLATELET # BLD AUTO: 316 X10(3)/MCL (ref 130–400)
PLATELET # BLD EST: NORMAL 10*3/UL
PMV BLD AUTO: 11.2 FL (ref 7.4–10.4)
POIKILOCYTOSIS BLD QL SMEAR: ABNORMAL
POTASSIUM SERPL-SCNC: 4.7 MMOL/L (ref 3.5–5.1)
PROT SERPL-MCNC: 5.4 GM/DL (ref 5.8–7.6)
RBC # BLD AUTO: 3.54 X10(6)/MCL (ref 4.7–6.1)
RBC MORPH BLD: ABNORMAL
SODIUM SERPL-SCNC: 136 MMOL/L (ref 136–145)
WBC # SPEC AUTO: 7.3 X10(3)/MCL (ref 4.5–11.5)

## 2022-09-28 PROCEDURE — 80053 COMPREHEN METABOLIC PANEL: CPT | Performed by: EMERGENCY MEDICINE

## 2022-09-28 PROCEDURE — 21400001 HC TELEMETRY ROOM

## 2022-09-28 PROCEDURE — 86403 PARTICLE AGGLUT ANTBDY SCRN: CPT | Performed by: NURSE PRACTITIONER

## 2022-09-28 PROCEDURE — 63600175 PHARM REV CODE 636 W HCPCS: Performed by: EMERGENCY MEDICINE

## 2022-09-28 PROCEDURE — 25000003 PHARM REV CODE 250: Performed by: INTERNAL MEDICINE

## 2022-09-28 PROCEDURE — 36415 COLL VENOUS BLD VENIPUNCTURE: CPT | Performed by: EMERGENCY MEDICINE

## 2022-09-28 PROCEDURE — 85025 COMPLETE CBC W/AUTO DIFF WBC: CPT | Performed by: EMERGENCY MEDICINE

## 2022-09-28 PROCEDURE — 87045 FECES CULTURE AEROBIC BACT: CPT | Performed by: NURSE PRACTITIONER

## 2022-09-28 PROCEDURE — 87507 IADNA-DNA/RNA PROBE TQ 12-25: CPT | Performed by: INTERNAL MEDICINE

## 2022-09-28 PROCEDURE — 86140 C-REACTIVE PROTEIN: CPT | Performed by: INTERNAL MEDICINE

## 2022-09-28 RX ORDER — TALC
6 POWDER (GRAM) TOPICAL NIGHTLY PRN
Status: DISCONTINUED | OUTPATIENT
Start: 2022-09-28 | End: 2022-10-04 | Stop reason: HOSPADM

## 2022-09-28 RX ORDER — LISINOPRIL AND HYDROCHLOROTHIAZIDE 20; 25 MG/1; MG/1
1 TABLET ORAL DAILY
Status: DISCONTINUED | OUTPATIENT
Start: 2022-09-28 | End: 2022-09-28

## 2022-09-28 RX ORDER — DICYCLOMINE HYDROCHLORIDE 20 MG/1
20 TABLET ORAL 3 TIMES DAILY PRN
Status: DISCONTINUED | OUTPATIENT
Start: 2022-09-28 | End: 2022-10-04 | Stop reason: HOSPADM

## 2022-09-28 RX ORDER — PANTOPRAZOLE SODIUM 40 MG/1
40 TABLET, DELAYED RELEASE ORAL DAILY
Refills: 3 | Status: DISCONTINUED | OUTPATIENT
Start: 2022-09-28 | End: 2022-10-04 | Stop reason: HOSPADM

## 2022-09-28 RX ORDER — ACETAMINOPHEN 325 MG/1
650 TABLET ORAL EVERY 8 HOURS PRN
Status: DISCONTINUED | OUTPATIENT
Start: 2022-09-28 | End: 2022-10-04 | Stop reason: HOSPADM

## 2022-09-28 RX ORDER — LISINOPRIL 20 MG/1
20 TABLET ORAL DAILY
Status: DISCONTINUED | OUTPATIENT
Start: 2022-09-28 | End: 2022-10-04 | Stop reason: HOSPADM

## 2022-09-28 RX ORDER — ASPIRIN 81 MG/1
81 TABLET ORAL DAILY
Status: DISCONTINUED | OUTPATIENT
Start: 2022-09-28 | End: 2022-10-04 | Stop reason: HOSPADM

## 2022-09-28 RX ORDER — TRAMADOL HYDROCHLORIDE 50 MG/1
50 TABLET ORAL EVERY 12 HOURS
Status: DISCONTINUED | OUTPATIENT
Start: 2022-09-28 | End: 2022-10-04 | Stop reason: HOSPADM

## 2022-09-28 RX ORDER — ONDANSETRON 2 MG/ML
4 INJECTION INTRAMUSCULAR; INTRAVENOUS EVERY 8 HOURS PRN
Status: DISCONTINUED | OUTPATIENT
Start: 2022-09-28 | End: 2022-10-04 | Stop reason: HOSPADM

## 2022-09-28 RX ORDER — HYDROCHLOROTHIAZIDE 25 MG/1
25 TABLET ORAL DAILY
Status: DISCONTINUED | OUTPATIENT
Start: 2022-09-29 | End: 2022-10-04 | Stop reason: HOSPADM

## 2022-09-28 RX ORDER — FUROSEMIDE 20 MG/1
20 TABLET ORAL DAILY
Status: DISCONTINUED | OUTPATIENT
Start: 2022-09-28 | End: 2022-10-04 | Stop reason: HOSPADM

## 2022-09-28 RX ORDER — SODIUM CHLORIDE, SODIUM LACTATE, POTASSIUM CHLORIDE, CALCIUM CHLORIDE 600; 310; 30; 20 MG/100ML; MG/100ML; MG/100ML; MG/100ML
INJECTION, SOLUTION INTRAVENOUS CONTINUOUS
Status: DISCONTINUED | OUTPATIENT
Start: 2022-09-28 | End: 2022-09-28

## 2022-09-28 RX ORDER — MORPHINE SULFATE 4 MG/ML
4 INJECTION, SOLUTION INTRAMUSCULAR; INTRAVENOUS EVERY 4 HOURS PRN
Status: DISCONTINUED | OUTPATIENT
Start: 2022-09-28 | End: 2022-10-04 | Stop reason: HOSPADM

## 2022-09-28 RX ORDER — CIPROFLOXACIN 500 MG/1
500 TABLET ORAL EVERY 24 HOURS
Status: DISCONTINUED | OUTPATIENT
Start: 2022-09-29 | End: 2022-10-04

## 2022-09-28 RX ORDER — FLUTICASONE FUROATE AND VILANTEROL 100; 25 UG/1; UG/1
1 POWDER RESPIRATORY (INHALATION) DAILY
Status: DISCONTINUED | OUTPATIENT
Start: 2022-09-28 | End: 2022-10-04 | Stop reason: HOSPADM

## 2022-09-28 RX ORDER — DIPHENOXYLATE HYDROCHLORIDE AND ATROPINE SULFATE 2.5; .025 MG/1; MG/1
2 TABLET ORAL 3 TIMES DAILY
Status: DISCONTINUED | OUTPATIENT
Start: 2022-09-28 | End: 2022-10-04 | Stop reason: HOSPADM

## 2022-09-28 RX ORDER — PROCHLORPERAZINE EDISYLATE 5 MG/ML
5 INJECTION INTRAMUSCULAR; INTRAVENOUS EVERY 6 HOURS PRN
Status: DISCONTINUED | OUTPATIENT
Start: 2022-09-28 | End: 2022-10-04 | Stop reason: HOSPADM

## 2022-09-28 RX ORDER — MORPHINE SULFATE 4 MG/ML
6 INJECTION, SOLUTION INTRAMUSCULAR; INTRAVENOUS EVERY 4 HOURS PRN
Status: DISCONTINUED | OUTPATIENT
Start: 2022-09-28 | End: 2022-10-04 | Stop reason: HOSPADM

## 2022-09-28 RX ADMIN — MORPHINE SULFATE 4 MG: 4 INJECTION INTRAVENOUS at 06:09

## 2022-09-28 RX ADMIN — PANTOPRAZOLE SODIUM 40 MG: 40 TABLET, DELAYED RELEASE ORAL at 04:09

## 2022-09-28 RX ADMIN — TRAMADOL HYDROCHLORIDE 50 MG: 50 TABLET, COATED ORAL at 08:09

## 2022-09-28 RX ADMIN — DIPHENOXYLATE HYDROCHLORIDE AND ATROPINE SULFATE 2 TABLET: 2.5; .025 TABLET ORAL at 03:09

## 2022-09-28 RX ADMIN — DIPHENOXYLATE HYDROCHLORIDE AND ATROPINE SULFATE 2 TABLET: 2.5; .025 TABLET ORAL at 10:09

## 2022-09-28 RX ADMIN — ASPIRIN 81 MG: 81 TABLET, COATED ORAL at 03:09

## 2022-09-28 NOTE — ED PROVIDER NOTES
Encounter Date: 9/27/2022    SCRIBE #1 NOTE: I, Denisse Barber, am scribing for, and in the presence of,  Dr. Maloney. I have scribed the entire note.     History     Chief Complaint   Patient presents with    Abdominal Pain     Pt to ER via [facility] for abd pain.  Also body aches and nausea---Started several months ago.  Pain started today     62 year old male with a hx of DM presents to the ED via EMS for abdominal pain. Pt states over the past 4-5 months he has intermittently experienced abdominal pain, headache, dizziness, and vomiting. Pt has also experienced episodes of diarrhea. Pt states today his abdominal pain became worse to the point that he could not eat. Pt was taken off of his home O2 3 days ago due to insurance problems. Pt has experienced SOB and cough producing a white sputum since being taken off the home O2. Pt is scheduled to have an appointment with a GI doctor on 10/18/22.     PCP: Dr. Sarabjit Castaneda    The history is provided by the patient. No  was used.   Abdominal Pain  The current episode started several weeks ago. The onset of the illness was abrupt. The problem has been gradually worsening. The abdominal pain is generalized. The abdominal pain does not radiate. The abdominal pain is relieved by nothing. The abdominal pain is exacerbated by vomiting. The other symptoms of the illness include shortness of breath, nausea, vomiting and diarrhea. The other symptoms of the illness do not include fever.   The diarrhea began more than 1 week ago.   Nausea began more than 1 week ago. The nausea is associated with eating.   The vomiting began more than 2 days ago. The emesis contains stomach contents.   Symptoms associated with the illness do not include chills. Significant associated medical issues include diabetes.   Review of patient's allergies indicates:  No Known Allergies  No past medical history on file.  No past surgical history on file.  No family history on file.  Social  History     Tobacco Use    Smoking status: Never    Smokeless tobacco: Never     Review of Systems   Constitutional:  Negative for chills and fever.   Respiratory:  Positive for cough and shortness of breath.    Cardiovascular:  Negative for chest pain.   Gastrointestinal:  Positive for abdominal pain, diarrhea, nausea and vomiting.   Musculoskeletal:  Negative for myalgias.   Neurological:  Positive for dizziness and headaches. Negative for syncope.   All other systems reviewed and are negative.    Physical Exam     Initial Vitals [09/27/22 1729]   BP Pulse Resp Temp SpO2   (!) 140/84 110 18 97.9 °F (36.6 °C) 99 %      MAP       --         Physical Exam    Constitutional: He appears well-developed and well-nourished. No distress.   HENT:   Head: Normocephalic and atraumatic.   Cardiovascular:  Normal rate.           Pulmonary/Chest: No respiratory distress. He has no wheezes. He has no rhonchi. He exhibits no tenderness.   Abdominal: Abdomen is soft. He exhibits no distension. There is abdominal tenderness in the left lower quadrant. There is guarding. There is no rebound.   Musculoskeletal:         General: Normal range of motion.     Neurological: He is alert and oriented to person, place, and time. He has normal strength.   Skin: Skin is warm and dry.   Psychiatric: He has a normal mood and affect.       ED Course   Critical Care    Date/Time: 9/27/2022 11:31 PM  Performed by: Aaron Maloney MD  Authorized by: Aaron Maloney MD   Total critical care time (exclusive of procedural time) : 32 minutes  Critical care was necessary to treat or prevent imminent or life-threatening deterioration of the following conditions: dehydration, circulatory failure and renal failure.  Critical care was time spent personally by me on the following activities: discussions with primary provider, interpretation of cardiac output measurements, evaluation of patient's response to treatment, examination of patient, obtaining  history from patient or surrogate, ordering and performing treatments and interventions, ordering and review of laboratory studies and ordering and review of radiographic studies.      Labs Reviewed   COMPREHENSIVE METABOLIC PANEL - Abnormal; Notable for the following components:       Result Value    Sodium Level 133 (*)     Carbon Dioxide 17 (*)     Blood Urea Nitrogen 47.6 (*)     Creatinine 1.93 (*)     Albumin Level 2.5 (*)     Globulin 4.6 (*)     Albumin/Globulin Ratio 0.5 (*)     All other components within normal limits   URINALYSIS, REFLEX TO URINE CULTURE - Abnormal; Notable for the following components:    Color, UA Dark Yellow (*)     Protein, UA +1 (*)     Ketones, UA Trace (*)     Blood, UA Trace-Intact (*)     Bilirubin, UA +1 (*)     Leukocyte Esterase, UA +2 (*)     All other components within normal limits   CBC WITH DIFFERENTIAL - Abnormal; Notable for the following components:    RBC 3.78 (*)     Hgb 11.3 (*)     Hct 34.2 (*)     Platelet 519 (*)     MPV 10.5 (*)     IG# 0.07 (*)     All other components within normal limits   URINALYSIS, MICROSCOPIC - Abnormal; Notable for the following components:    WBC, UA 15 (*)     Squamous Epithelial Cells, UA 15 (*)     Bacteria, UA 1+ (*)     All other components within normal limits   LIPASE - Normal   MAGNESIUM - Normal   LACTIC ACID, PLASMA - Normal   CULTURE, URINE   BLOOD CULTURE [facility]   BLOOD CULTURE [facility]   CBC W/ AUTO DIFFERENTIAL    Narrative:     The following orders were created for panel order CBC W/ AUTO DIFFERENTIAL.  Procedure                               Abnormality         Status                     ---------                               -----------         ------                     CBC with Differential[[id number]]        Abnormal            Final result                 Please view results for these tests on the individual orders.   POCT GLUCOSE   ISTAT CHEM8     EKG Readings: (Independently Interpreted)   Initial Reading: No STEMI.  Rhythm: Sinus Tachycardia. Heart Rate: 118. Ectopy: No Ectopy. Conduction: Normal. ST Segments: Normal ST Segments. T Waves: Normal. Axis: Normal.   Done on 9/27/22 at 1732.      Imaging Results              CT Abdomen Pelvis  Without Contrast (Final result)  Result time 09/27/22 21:49:18      Final result by Rigo Pichardo MD (09/27/22 21:49:18)                   Impression:      1. No acute abnormality of the abdomen and pelvis.  2. Changes consistent with chronic interstitial fibrosis in the bilateral lung bases.      Electronically signed by: Rigo Pichardo MD  Date:    09/27/2022  Time:    21:49               Narrative:    EXAMINATION:  CT ABDOMEN PELVIS WITHOUT CONTRAST    CLINICAL HISTORY:  Abdominal pain, acute, nonlocalized;    TECHNIQUE:  Axial images of the pelvis were obtained without IV contrast administration.  Coronal and sagittal reconstructions were provided.  Three dimensional and MIP images were obtained and evaluated.  Total DLP was 1008 mGy-cm. Dose lowering technique and automated exposure control were utilized for this exam.    COMPARISON:  CT of the abdomen and pelvis 09/12/2012.    FINDINGS:  There are changes consistent with chronic interstitial fibrosis in the bilateral lung bases including bronchiectasis and subpleural ground-glass opacity.  Heart is not enlarged.    The liver is homogeneous in attenuation.  The gallbladder, spleen, pancreas, and adrenal glands are normal.  There are simple renal cysts bilaterally.  There is no hydronephrosis or nephrolithiasis.    The stomach and small bowel are decompressed.  The appendix is normal.  The colon is normal.  The urinary bladder is normal.  There is no pelvic or retroperitoneal adenopathy.  The aorta is nonaneurysmal.  There is no lytic or blastic osseous lesion.                                       Medications   0.9%  NaCl infusion (has no administration in time range)   lactated ringers bolus 1,000 mL (1,000 mLs Intravenous New Bag 9/27/22  2300)   piperacillin-tazobactam (ZOSYN) 4.5 g in dextrose 5 % in water (D5W) 5 % 100 mL IVPB (MB+) (0 g Intravenous Stopped 9/27/22 2143)   sodium chloride 0.9% bolus 2,000 mL (0 mLs Intravenous Stopped 9/27/22 2203)              Scribe Attestation:   Scribe #1: I performed the above scribed service and the documentation accurately describes the services I performed. I attest to the accuracy of the note.    Attending Attestation:           Physician Attestation for Scribe:  Physician Attestation Statement for Scribe #1: I, Dr. Maloney, reviewed documentation, as scribed by Denisse Barber in my presence, and it is both accurate and complete.           ED Course as of 09/27/22 2332   Tue Sep 27, 2022   2050 Patient recent ED visit with a CT that showed colonic wall thickening which could be infectious could be neoplastic.  He was scheduled to see GI in the near future but has not yet done so to get colonoscopy.  He was started on Cipro and Flagyl he did not complete those medications.  He does have bacteria present in his urine and he is tender in the left lower quadrant region.  With bacteria and the CT findings from his recent visit and I will give him Zosyn [LF]   2052 Has been having nausea and vomiting and loose stool.  He appears dehydrated has increased creatinine decreased CO2.  Continue IV hydration [LF]   2324 Blood pressure 103/79.  Discussed the case with his primary care provider Dr. Arreaga.  He will admit [LF]      ED Course User Index  [LF] Aaron Maloney MD                 Clinical Impression:   Final diagnoses:  [R10.9] Abdominal pain, unspecified abdominal location (Primary)  [R19.7] Diarrhea, unspecified type  [K52.9] Colitis  [I95.9] Hypotension, unspecified hypotension type  [N39.0] Acute UTI  [N17.9] DAVID (acute kidney injury)      ED Disposition Condition    Admit Stable                Aaron Maloney MD  09/27/22 2332

## 2022-09-28 NOTE — ED NOTES
Paged Dr. Arreaga on call physician to ask about blood pressure dropping and what parameters should be. Awaiting call back

## 2022-09-28 NOTE — CONSULTS
Gastroenterology Consultation Note    Reason for Consult:  abdominal pain    PCP:   Sarabjit Arreaga MD    Referring MD: Dr. Maloney      History of Present Illness (HPI):    62-year-old male known to Dr. Ibarra who has a past medical history of diabetes.    Patient presented to the ED on 09/27/2022 with complaints of Garett minute mid lower to left lower quadrant abdominal pain x 4-5 months.  He has also had headaches, dizziness, and nonbloody loose diarrhea on occasions.  He vomited once last week after a male, but no other episodes.  He has had associated decreased p.o. intake and weight loss.  He reports a colonoscopy is scheduled for 10/18/2022 with Dr. Ibarra.  On arrival here, he was hypotensive BP 70 over 50s, afebrile, mildly tachycardic, placed on 2 L O2 N/C.  Labs revealed WBC 10.9, H&H 11.3/34.2, platelets 235156, sodium 133, bicarb 17, BUN 47.6, creatinine 1.93, bili 2.5.  UA reflected UTI.  CT of the abdomen and pelvis without contrast showed no acute disease.  It did show fibrous changes of the lung bases. He ahs had persistent issues with hypotension despite several IV boluses.  GI was consulted due to ER physician concerned for colitis on CT, but final read showed no inflammation.    He was apparently started on Cipro and Flagyl empirically, but he did not finish these medications. Patient denies any new medications, foreign travel, sick contacts, well water, or any obvious changes in lifestyle before symptoms started.  He has never had an upper or lower endoscopy.  Denies any dark black or bloody emesis or stools.  No family history of ulcerative colitis or Crohn's disease.    ROS:  Review of Systems   Constitutional:  Positive for weight loss. Negative for fever and malaise/fatigue.   Respiratory:  Negative for cough and shortness of breath.    Cardiovascular:  Negative for chest pain, palpitations and leg swelling.   Gastrointestinal:  Positive for abdominal pain, diarrhea, nausea and  vomiting. Negative for blood in stool, constipation, heartburn and melena.   Musculoskeletal:  Negative for back pain and myalgias.   Skin:  Negative for rash.   Neurological:  Positive for weakness and headaches. Negative for speech change and focal weakness.   All other systems reviewed and are negative.    Medical History:   No past medical history on file.    Surgical History:   No past surgical history on file.    Family History:   No family history on file..     Social History:   Social History     Tobacco Use    Smoking status: Never    Smokeless tobacco: Never   Substance Use Topics    Alcohol use: Not on file       Allergies:  Review of patient's allergies indicates:  No Known Allergies    (Not in a hospital admission)        Objective Findings:    Vital Signs:  BP (!) 86/58 (BP Location: Right arm, Patient Position: Lying)   Pulse 88   Temp 97.9 °F (36.6 °C) (Temporal)   Resp 18   SpO2 99%   There is no height or weight on file to calculate BMI.    Physical Exam:  Physical Exam  Constitutional:       General: He is not in acute distress.  HENT:      Head: Normocephalic and atraumatic.   Eyes:      General: No scleral icterus.     Conjunctiva/sclera: Conjunctivae normal.   Cardiovascular:      Rate and Rhythm: Normal rate and regular rhythm.   Pulmonary:      Effort: Pulmonary effort is normal. No respiratory distress.   Abdominal:      General: Bowel sounds are normal. There is no distension.      Palpations: Abdomen is soft.      Tenderness: There is no abdominal tenderness.   Musculoskeletal:         General: No tenderness. Normal range of motion.   Skin:     General: Skin is warm and dry.   Neurological:      Mental Status: He is alert and oriented to person, place, and time.   Psychiatric:         Mood and Affect: Mood normal.         Behavior: Behavior normal.       Labs:  Recent Results (from the past 48 hour(s))   Comp. Metabolic Panel    Collection Time: 09/27/22  5:55 PM   Result Value Ref  Range    Sodium Level 133 (L) 136 - 145 mmol/L    Potassium Level 4.9 3.5 - 5.1 mmol/L    Chloride 107 98 - 107 mmol/L    Carbon Dioxide 17 (L) 23 - 31 mmol/L    Glucose Level 99 82 - 115 mg/dL    Blood Urea Nitrogen 47.6 (H) 8.4 - 25.7 mg/dL    Creatinine 1.93 (H) 0.73 - 1.18 mg/dL    Calcium Level Total 8.9 8.8 - 10.0 mg/dL    Protein Total 7.1 5.8 - 7.6 gm/dL    Albumin Level 2.5 (L) 3.4 - 4.8 gm/dL    Globulin 4.6 (H) 2.4 - 3.5 gm/dL    Albumin/Globulin Ratio 0.5 (L) 1.1 - 2.0 ratio    Bilirubin Total 0.3 <=1.5 mg/dL    Alkaline Phosphatase 50 40 - 150 unit/L    Alanine Aminotransferase 6 0 - 55 unit/L    Aspartate Aminotransferase 12 5 - 34 unit/L    eGFR 39 mls/min/1.73/m2   Lipase    Collection Time: 09/27/22  5:55 PM   Result Value Ref Range    Lipase Level 20 <=60 U/L   CBC with Differential    Collection Time: 09/27/22  5:55 PM   Result Value Ref Range    WBC 10.9 4.5 - 11.5 x10(3)/mcL    RBC 3.78 (L) 4.70 - 6.10 x10(6)/mcL    Hgb 11.3 (L) 14.0 - 18.0 gm/dL    Hct 34.2 (L) 42.0 - 52.0 %    MCV 90.5 80.0 - 94.0 fL    MCH 29.9 27.0 - 31.0 pg    MCHC 33.0 33.0 - 36.0 mg/dL    RDW 14.8 11.5 - 17.0 %    Platelet 519 (H) 130 - 400 x10(3)/mcL    MPV 10.5 (H) 7.4 - 10.4 fL    Neut % 68.5 %    Lymph % 15.4 %    Mono % 11.7 %    Eos % 3.1 %    Basophil % 0.7 %    Lymph # 1.68 0.6 - 4.6 x10(3)/mcL    Neut # 7.5 2.1 - 9.2 x10(3)/mcL    Mono # 1.28 0.1 - 1.3 x10(3)/mcL    Eos # 0.34 0 - 0.9 x10(3)/mcL    Baso # 0.08 0 - 0.2 x10(3)/mcL    IG# 0.07 (H) 0 - 0.04 x10(3)/mcL    IG% 0.6 %    NRBC% 0.0 %   Magnesium    Collection Time: 09/27/22  5:55 PM   Result Value Ref Range    Magnesium Level 2.50 1.60 - 2.60 mg/dL   Urinalysis, Reflex to Urine Culture Urine, Clean Catch    Collection Time: 09/27/22  7:24 PM    Specimen: Urine   Result Value Ref Range    Color, UA Dark Yellow (A) Yellow, Colorless, Other, Clear    Appearance, UA Clear Clear    Specific Gravity, UA 1.025 1.001 - 1.030    pH, UA 5.0 5.0, 5.5, 6.0, 6.5, 7.0,  7.5, 8.0, 8.5    Protein, UA +1 (A) Negative, 300  mg/dL    Glucose, UA Negative Negative, Normal mg/dL    Ketones, UA Trace (A) Negative, +1, +2, +3, +4, +5, >=160, >=80 mg/dL    Blood, UA Trace-Intact (A) Negative unit/L    Bilirubin, UA +1 (A) Negative mg/dL    Urobilinogen, UA 1.0 0.2, 1.0, Normal mg/dL    Nitrites, UA Negative Negative    Leukocyte Esterase, UA +2 (A) Negative, 75  unit/L   Urinalysis, Microscopic    Collection Time: 09/27/22  7:24 PM   Result Value Ref Range    RBC, UA <5 <=5 /HPF    WBC, UA 15 (H) <=5 /HPF    Squamous Epithelial Cells, UA 15 (H) <=5 /HPF    Bacteria, UA 1+ (A) None Seen, Rare, Occasional /HPF   Urine culture    Collection Time: 09/27/22  7:24 PM    Specimen: Urine   Result Value Ref Range    Urine Culture No Growth At 24 Hours    POCT glucose    Collection Time: 09/27/22  8:27 PM   Result Value Ref Range    POCT Glucose 90 70 - 110 mg/dL   Lactic acid, plasma    Collection Time: 09/27/22 10:53 PM   Result Value Ref Range    Lactic Acid Level 1.6 0.5 - 2.2 mmol/L   Comprehensive metabolic panel    Collection Time: 09/28/22  5:53 AM   Result Value Ref Range    Sodium Level 136 136 - 145 mmol/L    Potassium Level 4.7 3.5 - 5.1 mmol/L    Chloride 109 (H) 98 - 107 mmol/L    Carbon Dioxide 17 (L) 23 - 31 mmol/L    Glucose Level 81 (L) 82 - 115 mg/dL    Blood Urea Nitrogen 41.4 (H) 8.4 - 25.7 mg/dL    Creatinine 1.63 (H) 0.73 - 1.18 mg/dL    Calcium Level Total 8.0 (L) 8.8 - 10.0 mg/dL    Protein Total 5.4 (L) 5.8 - 7.6 gm/dL    Albumin Level 2.2 (L) 3.4 - 4.8 gm/dL    Globulin 3.2 2.4 - 3.5 gm/dL    Albumin/Globulin Ratio 0.7 (L) 1.1 - 2.0 ratio    Bilirubin Total 0.3 <=1.5 mg/dL    Alkaline Phosphatase 46 40 - 150 unit/L    Alanine Aminotransferase 6 0 - 55 unit/L    Aspartate Aminotransferase 9 5 - 34 unit/L    eGFR 47 mls/min/1.73/m2   CBC with Differential    Collection Time: 09/28/22  5:54 AM   Result Value Ref Range    WBC 7.3 4.5 - 11.5 x10(3)/mcL    RBC 3.54 (L) 4.70 - 6.10  x10(6)/mcL    Hgb 10.3 (L) 14.0 - 18.0 gm/dL    Hct 32.2 (L) 42.0 - 52.0 %    MCV 91.0 80.0 - 94.0 fL    MCH 29.1 27.0 - 31.0 pg    MCHC 32.0 (L) 33.0 - 36.0 mg/dL    RDW 15.0 11.5 - 17.0 %    Platelet 316 130 - 400 x10(3)/mcL    MPV 11.2 (H) 7.4 - 10.4 fL    IG# 0.05 (H) 0 - 0.04 x10(3)/mcL    IG% 0.7 %    NRBC% 0.0 %   Manual Differential    Collection Time: 09/28/22  5:54 AM   Result Value Ref Range    Neut Man 66 %    Lymph Man 17 %    Monocyte Man 12 %    Eos Man 5 %    Basophil Man 1 %    Instr WBC 7 x10(3)/mcL    Abs Mono 0.84 0.1 - 1.3 x10(3)/mcL    Abs Eos  0.35 0 - 0.9 x10(3)/mcL    Abs Baso 0.07 0 - 0.2 x10(3)/mcL    Abs Lymp 1.19 0.6 - 4.6 x10(3)/mcL    Abs Neut 4.62 2.1 - 9.2 x10(3)/mcL    RBC Morph Abnormal (A) Normal    Poik 2+ (A) (none)    Dom Cells 2+ (A) (none)    Platelet Est Normal Normal, Adequate       CT Abdomen Pelvis  Without Contrast   Final Result      1. No acute abnormality of the abdomen and pelvis.   2. Changes consistent with chronic interstitial fibrosis in the bilateral lung bases.         Electronically signed by: Rigo Pichardo MD   Date:    09/27/2022   Time:    21:49          Imaging:  Procedure Component Value Units Date/Time   CT Abdomen Pelvis Without Contrast [[id number]] Resulted: 09/27/22 2149   Order Status: Completed Updated: 09/27/22 2151   Narrative:     EXAMINATION:   CT ABDOMEN PELVIS WITHOUT CONTRAST     CLINICAL HISTORY:   Abdominal pain, acute, nonlocalized;     TECHNIQUE:   Axial images of the pelvis were obtained without IV contrast administration.  Coronal and sagittal reconstructions were provided.  Three dimensional and MIP images were obtained and evaluated.  Total DLP was 1008 mGy-cm. Dose lowering technique and automated exposure control were utilized for this exam.     COMPARISON:   CT of the abdomen and pelvis 09/12/2012.     FINDINGS:   There are changes consistent with chronic interstitial fibrosis in the bilateral lung bases including bronchiectasis and  subpleural ground-glass opacity.  Heart is not enlarged.     The liver is homogeneous in attenuation.  The gallbladder, spleen, pancreas, and adrenal glands are normal.  There are simple renal cysts bilaterally.  There is no hydronephrosis or nephrolithiasis.     The stomach and small bowel are decompressed.  The appendix is normal.  The colon is normal.  The urinary bladder is normal.  There is no pelvic or retroperitoneal adenopathy.  The aorta is nonaneurysmal.  There is no lytic or blastic osseous lesion.    Impression:       1. No acute abnormality of the abdomen and pelvis.   2. Changes consistent with chronic interstitial fibrosis in the bilateral lung bases.       Electronically signed by: Rigo Pichardo MD   Date: 09/27/2022   Time: 21:49       Assessment/Plan:  1. Abdominal pain, unspecified abdominal location    2. Body aches    3. Nausea    4. Diarrhea, unspecified type    5. Colitis    6. Hypotension, unspecified hypotension type    7. Acute UTI    8. DAVID (acute kidney injury)       62-year-old male with history a.m..  Presents with several complaints including lower abdominal pain, headache, weakness, decreased appetite, decreased p.o. intake times several months.  Symptoms have wax and waned over the last few months.  No GI bleeding noted.  CT negative.  Dehydrated on labs.    Lower abdominal pain  Diarrhea  Weight loss  Decreased PO intake  -F/u stool studies. If negative, we will consider a colonoscopy  -Clear liquids today  -IV fluids  -Does not sound like gallbladder disease, so will hold off on US  -Supportive care for now    Juanito Presley PA-C    Thank you for allowing us to participate in the care of Vic Meng.

## 2022-09-28 NOTE — ED NOTES
Spoke to Dr. Sarabjit Arreaga via phone about pt being hypotensive. Explained to Dr. Arreaga that pt is still hypotensive after 3L of fluid bolus and blood pressure looks to be trending down. Was told by verbal order from Dr. Arreaga to change ordered LR @ 125mL/hr to NS @ 125mL/hr. Specifically asked about blood pressure parameters and was not given any.

## 2022-09-28 NOTE — H&P
[facility]                       [address] [address]    PATIENT NAME:       ЮЛИЯ NUNEZ   YOB: 1960  CSN:                [id number]   MRN:                [id number]  ADMIT DATE:         09/27/2022 17:31:00  PHYSICIAN:          Sarabjit Arreaga MD                        HISTORY AND PHYSICAL      CHIEF COMPLAINT:  Abdominal pain, diarrhea.    HISTORY OF PRESENT ILLNESS:  The patient is a 62-year-old  male,   has been having abdominal problem for a while, had an appointment with Dr. Terry as out patient he di not see DR terry yet but patient has been coming to ER due to abdominal pain,  repeatedly.  At this time, we decided to admit the patient for further   evaluation and treatment.  He stated that the pain is always basically constant,   most of the time he has diarrhea on daily basis, continuous.  Therefore, the   patient is admitted for further evaluation and treatment and GI had been   consulted.    PAST MEDICAL HISTORY:  Significant for hypertension, diabetes mellitus type 2,   arthritis, , history of joint pain, and thyroid disorder.    SOCIAL HISTORY:  Patient is , has one daughter.  Smokes approximately a   half-pack of cigarettes a day.  Denies IV drug abuse and alcohol abuse.    FAMILY HISTORY:  Significant for diabetes mellitus, hypertension, cancer, and   heart disease.    PAST SURGICAL HISTORY:  The patient had thyroid surgery in February 2022.  Also   had hand surgery and varicose veins.    ALLERGIES:  NO KNOWN ALLERGIES.     CURRENT MEDICATION:  Patient is on:    1. Lasix 20 mg once a day.    2. Hydrochlorothiazide 25 mg once a day.    3. Lisinopril 20 mg once a day.    4. Pantoprazole 40 mg once a day.  5. He takes tramadol p.r.n. q.12 hours 50 mg.  6. Melatonin 6 mg daily.  7. He was also taking aspirin 81 mg once a day.    8. He also was taking Advair HFA 40  mcg twice a day 2 puffs.  9. Omeprazole 40 mg once a day.    REVIEW OF SYSTEMS:  CARDIOVASCULAR:  Negative for chest pain.  RESPIRATORY:  No shortness of breath or productive cough.  GASTROINTESTINAL:  Abdominal pain and diarrhea.   MUSCULOSKELETAL:  Basically, patient complaining of joint pain secondary to   arthritis.  NEUROLOGIC:  No focal neurological deficit.  ENDOCRINE:  Patient has thyroid disorder, and also diabetes.    PHYSICAL EXAMINATION:  VITAL SIGNS:  At the time I had seen the patient, the blood pressure of 80/60,   pulse 90, respirations 20, temperature 97.9.    HEAD:  Normocephalic with no acute trauma to the head.    EYES:  Both equally reactive to light, accommodation, seem to be satisfactory.    MOUTH:  Patient has severe dental caries.  The.    NECK:  Supple.  No JVD or adenopathy.    HEART:  Patient has S1, S2.  No murmur or gallop.  CHEST:  Fairly clear.  No wheezing or rales.  ABDOMEN:  Bowel sounds positive.    Minimal tenderness around the periumbilical area.  EXTREMITIES: superior  good range   of motion .extremity inferior.  No significant edema at this time.  No clubbing or   cyanosis.    NEUROLOGICAL:  No focal deficit.  Cranial nerves 2 through 12 appear to be   intact.    LABORATORY:  Patient has a sodium of 136, potassium 4.7.  Hemoglobin is 10.3,   hematocrit 32.2.    IMPRESSION:    1. The patient has abdominal pain.  2. A history of colitis.  3. Diabetes mellitus type 2.  4. Hypertension.  5. Arthritis.  6. Joint pain.    PLAN:  We are going to consult GI.  Hopefully they will extensive and complete   workup on this patient and see if we can find the source of the problem and have   him to feels better.        ______________________________  MD ISSAC Villatoro/JADS  DD:  09/28/2022  Time:  03:30PM  DT:  09/28/2022  Time:  04:50PM  Job #:  [id number]      HISTORY AND PHYSICAL

## 2022-09-28 NOTE — ED NOTES
Gave pt sandwich box and coffee with permission from Dr. Maloney. Approximately 10 min after began eating pt had urge to have bowel movement. Put bedside commode in room for pt and helped him to it

## 2022-09-28 NOTE — ED NOTES
Pt presents to ED c/o abdominal pain, diarrhea, vomiting, weakness x 3-4 months. Has been seen multiple times for same and hasn't had a chance to follow up with GI yet but has an appointment on 10/18. Recently had oxygen taken out of his home due to cost. Was on 2L NC but was not on oxygen while in waiting room in ED. Was brought back to room 34 after repeat vitals in waiting room showed hypotension and hypoxia. Pt was prescribed abx cipro and flagyl but did not finish taking them. When asked why, pt stated he didn't know

## 2022-09-29 LAB — POCT GLUCOSE: 73 MG/DL (ref 70–110)

## 2022-09-29 PROCEDURE — 25000003 PHARM REV CODE 250: Performed by: INTERNAL MEDICINE

## 2022-09-29 PROCEDURE — 21400001 HC TELEMETRY ROOM

## 2022-09-29 PROCEDURE — 25000003 PHARM REV CODE 250: Performed by: PHYSICIAN ASSISTANT

## 2022-09-29 RX ORDER — SODIUM, POTASSIUM,MAG SULFATES 17.5-3.13G
1 SOLUTION, RECONSTITUTED, ORAL ORAL ONCE
Status: COMPLETED | OUTPATIENT
Start: 2022-09-29 | End: 2022-09-29

## 2022-09-29 RX ADMIN — DIPHENOXYLATE HYDROCHLORIDE AND ATROPINE SULFATE 2 TABLET: 2.5; .025 TABLET ORAL at 03:09

## 2022-09-29 RX ADMIN — ASPIRIN 81 MG: 81 TABLET, COATED ORAL at 09:09

## 2022-09-29 RX ADMIN — PANTOPRAZOLE SODIUM 40 MG: 40 TABLET, DELAYED RELEASE ORAL at 09:09

## 2022-09-29 RX ADMIN — CIPROFLOXACIN 500 MG: 500 TABLET, FILM COATED ORAL at 09:09

## 2022-09-29 RX ADMIN — TRAMADOL HYDROCHLORIDE 50 MG: 50 TABLET, COATED ORAL at 09:09

## 2022-09-29 RX ADMIN — HYDROCHLOROTHIAZIDE 25 MG: 25 TABLET ORAL at 04:09

## 2022-09-29 RX ADMIN — FUROSEMIDE 20 MG: 20 TABLET ORAL at 09:09

## 2022-09-29 RX ADMIN — LISINOPRIL 20 MG: 20 TABLET ORAL at 09:09

## 2022-09-29 RX ADMIN — SODIUM SULFATE, POTASSIUM SULFATE, MAGNESIUM SULFATE 354 ML: 17.5; 3.13; 1.6 SOLUTION, CONCENTRATE ORAL at 05:09

## 2022-09-29 RX ADMIN — DIPHENOXYLATE HYDROCHLORIDE AND ATROPINE SULFATE 2 TABLET: 2.5; .025 TABLET ORAL at 09:09

## 2022-09-29 RX ADMIN — DIPHENOXYLATE HYDROCHLORIDE AND ATROPINE SULFATE 2 TABLET: 2.5; .025 TABLET ORAL at 08:09

## 2022-09-29 RX ADMIN — TRAMADOL HYDROCHLORIDE 50 MG: 50 TABLET, COATED ORAL at 08:09

## 2022-09-29 NOTE — PLAN OF CARE
Problem: Adult Inpatient Plan of Care  Goal: Plan of Care Review  Outcome: Ongoing, Progressing  Goal: Patient-Specific Goal (Individualized)  Outcome: Ongoing, Progressing  Goal: Absence of Hospital-Acquired Illness or Injury  Outcome: Ongoing, Progressing  Goal: Optimal Comfort and Wellbeing  Outcome: Ongoing, Progressing  Goal: Readiness for Transition of Care  Outcome: Ongoing, Not Progressing

## 2022-09-29 NOTE — PROGRESS NOTES
[facility]                       [address] [address]    PATIENT NAME:       ЮЛИЯ NUNEZ   YOB: 1960  CSN:                [id number]   MRN:                [id number]  ADMIT DATE:         09/27/2022 17:31:00  PHYSICIAN:          Sarabjit Arreaga MD                            PROGRESS NOTE    DATE:  09/29/2022 00:00:00    Patient is a 62-year-old  male, who came to this hospital with   abdominal pain.  Patient has been having problem with his digestive system for   quite a while.  He has an appointment with Dr. Ibarra , but so far it is not   completed yet, but came to the ER with severe abdominal pain, complaining of   recurrent diarrhea.  Therefore, I consulted GI to see the patient.    PHYSICAL EXAMINATION:  GENERAL APPEARANCE:  Today, upon examination, he appeared in no acute distress.      HEART:  S1, S2.  No murmur or gallop.   CHEST:  Fairly clear.  No wheezing.  No rales.    ABDOMEN:  No significant pain on palpation.  His bowel sounds are present.    EXTREMITIES:  Superior and inferior, fairly good range of motion.    IMPRESSION:    1. Abdominal pain.    2. Diarrhea.    3. Diabetes mellitus type 2.    4. History of colitis.    5. Arthritis.    6. Joint pain.    7. Hypertension.    PLAN:  GI has planned to have a colonoscopy on this patient tomorrow.        ______________________________  Sarabjit Arreaga MD    CHL/AQS  DD:  09/29/2022  Time:  02:56PM  DT:  09/29/2022  Time:  03:48PM  Job #:  [id number]      PROGRESS NOTE

## 2022-09-29 NOTE — PROGRESS NOTES
"Gastroenterology Progress Note    Subjective:    Patient feels significant better overall today. No more abdominal pain. No fever or chills. +loose stool without blood.    Objective:    ROS:    ROS      Vital Signs:  /70   Pulse 85   Temp 97.6 °F (36.4 °C) (Oral)   Resp 18   Ht 6' 2" (1.88 m)   Wt 104.3 kg (229 lb 15 oz)   SpO2 97%   BMI 29.52 kg/m²   Body mass index is 29.52 kg/m².    Physical Exam:    Physical Exam  Constitutional:       General: He is not in acute distress.  Cardiovascular:      Rate and Rhythm: Normal rate and regular rhythm.   Pulmonary:      Effort: Pulmonary effort is normal. No respiratory distress.   Abdominal:      General: There is no distension.      Palpations: Abdomen is soft.      Tenderness: There is no abdominal tenderness.   Musculoskeletal:         General: No tenderness. Normal range of motion.   Skin:     General: Skin is warm and dry.   Neurological:      Mental Status: He is alert and oriented to person, place, and time.       Labs:  Recent Results (from the past 48 hour(s))   Comp. Metabolic Panel    Collection Time: 09/27/22  5:55 PM   Result Value Ref Range    Sodium Level 133 (L) 136 - 145 mmol/L    Potassium Level 4.9 3.5 - 5.1 mmol/L    Chloride 107 98 - 107 mmol/L    Carbon Dioxide 17 (L) 23 - 31 mmol/L    Glucose Level 99 82 - 115 mg/dL    Blood Urea Nitrogen 47.6 (H) 8.4 - 25.7 mg/dL    Creatinine 1.93 (H) 0.73 - 1.18 mg/dL    Calcium Level Total 8.9 8.8 - 10.0 mg/dL    Protein Total 7.1 5.8 - 7.6 gm/dL    Albumin Level 2.5 (L) 3.4 - 4.8 gm/dL    Globulin 4.6 (H) 2.4 - 3.5 gm/dL    Albumin/Globulin Ratio 0.5 (L) 1.1 - 2.0 ratio    Bilirubin Total 0.3 <=1.5 mg/dL    Alkaline Phosphatase 50 40 - 150 unit/L    Alanine Aminotransferase 6 0 - 55 unit/L    Aspartate Aminotransferase 12 5 - 34 unit/L    eGFR 39 mls/min/1.73/m2   Lipase    Collection Time: 09/27/22  5:55 PM   Result Value Ref Range    Lipase Level 20 <=60 U/L   CBC with Differential    Collection " Time: 09/27/22  5:55 PM   Result Value Ref Range    WBC 10.9 4.5 - 11.5 x10(3)/mcL    RBC 3.78 (L) 4.70 - 6.10 x10(6)/mcL    Hgb 11.3 (L) 14.0 - 18.0 gm/dL    Hct 34.2 (L) 42.0 - 52.0 %    MCV 90.5 80.0 - 94.0 fL    MCH 29.9 27.0 - 31.0 pg    MCHC 33.0 33.0 - 36.0 mg/dL    RDW 14.8 11.5 - 17.0 %    Platelet 519 (H) 130 - 400 x10(3)/mcL    MPV 10.5 (H) 7.4 - 10.4 fL    Neut % 68.5 %    Lymph % 15.4 %    Mono % 11.7 %    Eos % 3.1 %    Basophil % 0.7 %    Lymph # 1.68 0.6 - 4.6 x10(3)/mcL    Neut # 7.5 2.1 - 9.2 x10(3)/mcL    Mono # 1.28 0.1 - 1.3 x10(3)/mcL    Eos # 0.34 0 - 0.9 x10(3)/mcL    Baso # 0.08 0 - 0.2 x10(3)/mcL    IG# 0.07 (H) 0 - 0.04 x10(3)/mcL    IG% 0.6 %    NRBC% 0.0 %   Magnesium    Collection Time: 09/27/22  5:55 PM   Result Value Ref Range    Magnesium Level 2.50 1.60 - 2.60 mg/dL   Urinalysis, Reflex to Urine Culture Urine, Clean Catch    Collection Time: 09/27/22  7:24 PM    Specimen: Urine   Result Value Ref Range    Color, UA Dark Yellow (A) Yellow, Colorless, Other, Clear    Appearance, UA Clear Clear    Specific Gravity, UA 1.025 1.001 - 1.030    pH, UA 5.0 5.0, 5.5, 6.0, 6.5, 7.0, 7.5, 8.0, 8.5    Protein, UA +1 (A) Negative, 300  mg/dL    Glucose, UA Negative Negative, Normal mg/dL    Ketones, UA Trace (A) Negative, +1, +2, +3, +4, +5, >=160, >=80 mg/dL    Blood, UA Trace-Intact (A) Negative unit/L    Bilirubin, UA +1 (A) Negative mg/dL    Urobilinogen, UA 1.0 0.2, 1.0, Normal mg/dL    Nitrites, UA Negative Negative    Leukocyte Esterase, UA +2 (A) Negative, 75  unit/L   Urinalysis, Microscopic    Collection Time: 09/27/22  7:24 PM   Result Value Ref Range    RBC, UA <5 <=5 /HPF    WBC, UA 15 (H) <=5 /HPF    Squamous Epithelial Cells, UA 15 (H) <=5 /HPF    Bacteria, UA 1+ (A) None Seen, Rare, Occasional /HPF   Urine culture    Collection Time: 09/27/22  7:24 PM    Specimen: Urine   Result Value Ref Range    Urine Culture (A)      >/= 100,000 colonies/ml Gram-positive coccus probable staph    POCT glucose    Collection Time: 09/27/22  8:27 PM   Result Value Ref Range    POCT Glucose 90 70 - 110 mg/dL   Blood culture #1 **CANNOT BE ORDERED STAT**    Collection Time: 09/27/22  9:53 PM    Specimen: Hand, Right; Blood   Result Value Ref Range    CULTURE, BLOOD ([facility]) No Growth At 24 Hours    Blood culture #2 **CANNOT BE ORDERED STAT**    Collection Time: 09/27/22 10:53 PM    Specimen: Antecubital, Right; Blood   Result Value Ref Range    CULTURE, BLOOD ([facility]) No Growth At 24 Hours    Lactic acid, plasma    Collection Time: 09/27/22 10:53 PM   Result Value Ref Range    Lactic Acid Level 1.6 0.5 - 2.2 mmol/L   Comprehensive metabolic panel    Collection Time: 09/28/22  5:53 AM   Result Value Ref Range    Sodium Level 136 136 - 145 mmol/L    Potassium Level 4.7 3.5 - 5.1 mmol/L    Chloride 109 (H) 98 - 107 mmol/L    Carbon Dioxide 17 (L) 23 - 31 mmol/L    Glucose Level 81 (L) 82 - 115 mg/dL    Blood Urea Nitrogen 41.4 (H) 8.4 - 25.7 mg/dL    Creatinine 1.63 (H) 0.73 - 1.18 mg/dL    Calcium Level Total 8.0 (L) 8.8 - 10.0 mg/dL    Protein Total 5.4 (L) 5.8 - 7.6 gm/dL    Albumin Level 2.2 (L) 3.4 - 4.8 gm/dL    Globulin 3.2 2.4 - 3.5 gm/dL    Albumin/Globulin Ratio 0.7 (L) 1.1 - 2.0 ratio    Bilirubin Total 0.3 <=1.5 mg/dL    Alkaline Phosphatase 46 40 - 150 unit/L    Alanine Aminotransferase 6 0 - 55 unit/L    Aspartate Aminotransferase 9 5 - 34 unit/L    eGFR 47 mls/min/1.73/m2   CBC with Differential    Collection Time: 09/28/22  5:54 AM   Result Value Ref Range    WBC 7.3 4.5 - 11.5 x10(3)/mcL    RBC 3.54 (L) 4.70 - 6.10 x10(6)/mcL    Hgb 10.3 (L) 14.0 - 18.0 gm/dL    Hct 32.2 (L) 42.0 - 52.0 %    MCV 91.0 80.0 - 94.0 fL    MCH 29.1 27.0 - 31.0 pg    MCHC 32.0 (L) 33.0 - 36.0 mg/dL    RDW 15.0 11.5 - 17.0 %    Platelet 316 130 - 400 x10(3)/mcL    MPV 11.2 (H) 7.4 - 10.4 fL    IG# 0.05 (H) 0 - 0.04 x10(3)/mcL    IG% 0.7 %    NRBC% 0.0 %   Manual Differential    Collection Time: 09/28/22  5:54 AM   Result Value  Ref Range    Neut Man 66 %    Lymph Man 17 %    Monocyte Man 12 %    Eos Man 5 %    Basophil Man 1 %    Instr WBC 7 x10(3)/mcL    Abs Mono 0.84 0.1 - 1.3 x10(3)/mcL    Abs Eos  0.35 0 - 0.9 x10(3)/mcL    Abs Baso 0.07 0 - 0.2 x10(3)/mcL    Abs Lymp 1.19 0.6 - 4.6 x10(3)/mcL    Abs Neut 4.62 2.1 - 9.2 x10(3)/mcL    RBC Morph Abnormal (A) Normal    Poik 2+ (A) (none)    Dom Cells 2+ (A) (none)    Platelet Est Normal Normal, Adequate   Stool Culture    Collection Time: 09/28/22  7:59 AM    Specimen: Stool   Result Value Ref Range    Stool Culture       Negative for Salmonella, Shigella, Campylobacter, Vibrio, Aeromonas, Pleisiomonas,Yersinia, or Shiga Toxin 1 and 2.   FECAL LEUKOCYTES - LACTOFERRIN ON  STOOL    Collection Time: 09/28/22  7:59 AM   Result Value Ref Range    Fecal Leukocyte Positive (A) Negative    Leukocyte Positive Control Positive Positive    Leukocyte Negative Control Negative Negative       Imaging:    No new radiological images to review.      Assessment/Plan:  1. Abdominal pain, unspecified abdominal location    2. Body aches    3. Nausea    4. Diarrhea, unspecified type    5. Colitis    6. Hypotension, unspecified hypotension type    7. Acute UTI    8. DAVID (acute kidney injury)       62-year-old male with history a.m..  Presents with several complaints including lower abdominal pain, headache, weakness, decreased appetite, decreased p.o. intake times several months.  Symptoms have wax and waned over the last few months.  No GI bleeding noted.  CT negative.  Dehydrated on labs.     Lower abdominal pain  Diarrhea  Weight loss  Decreased PO intake  -Fecal WBC +  -F/u pending stool studies  -Clear liquids today  -NPO after midnight for tentative colonoscopy tomorrow, unless stool studies show infection  -IV fluids  -Supportive care for now    Juanito Presley PA-C        Patient seen and examined.   I saw the patient with the physician assistant and agree with physician assistants findings and  plan.     Will plan for c-scope.  Pt feeling better.  Abd pain improving.  Stool studies pending.

## 2022-09-30 ENCOUNTER — ANESTHESIA EVENT (OUTPATIENT)
Dept: ENDOSCOPY | Facility: HOSPITAL | Age: 62
DRG: 386 | End: 2022-09-30
Payer: MEDICAID

## 2022-09-30 ENCOUNTER — ANESTHESIA (OUTPATIENT)
Dept: ENDOSCOPY | Facility: HOSPITAL | Age: 62
DRG: 386 | End: 2022-09-30
Payer: MEDICAID

## 2022-09-30 LAB
BACTERIA STL CULT: NORMAL
BACTERIA UR CULT: ABNORMAL
CRP SERPL-MCNC: 74.9 MG/L
POCT GLUCOSE: 79 MG/DL (ref 70–110)

## 2022-09-30 PROCEDURE — 25000003 PHARM REV CODE 250: Performed by: ANESTHESIOLOGY

## 2022-09-30 PROCEDURE — 27201423 OPTIME MED/SURG SUP & DEVICES STERILE SUPPLY: Performed by: INTERNAL MEDICINE

## 2022-09-30 PROCEDURE — 80074 ACUTE HEPATITIS PANEL: CPT | Performed by: INTERNAL MEDICINE

## 2022-09-30 PROCEDURE — 25000003 PHARM REV CODE 250: Performed by: EMERGENCY MEDICINE

## 2022-09-30 PROCEDURE — 63600175 PHARM REV CODE 636 W HCPCS: Performed by: STUDENT IN AN ORGANIZED HEALTH CARE EDUCATION/TRAINING PROGRAM

## 2022-09-30 PROCEDURE — 37000009 HC ANESTHESIA EA ADD 15 MINS: Performed by: INTERNAL MEDICINE

## 2022-09-30 PROCEDURE — 25000003 PHARM REV CODE 250: Performed by: STUDENT IN AN ORGANIZED HEALTH CARE EDUCATION/TRAINING PROGRAM

## 2022-09-30 PROCEDURE — 45378 DIAGNOSTIC COLONOSCOPY: CPT | Performed by: INTERNAL MEDICINE

## 2022-09-30 PROCEDURE — 37000008 HC ANESTHESIA 1ST 15 MINUTES: Performed by: INTERNAL MEDICINE

## 2022-09-30 PROCEDURE — 21400001 HC TELEMETRY ROOM

## 2022-09-30 PROCEDURE — 82962 GLUCOSE BLOOD TEST: CPT | Performed by: INTERNAL MEDICINE

## 2022-09-30 PROCEDURE — 94761 N-INVAS EAR/PLS OXIMETRY MLT: CPT

## 2022-09-30 PROCEDURE — 25000003 PHARM REV CODE 250: Performed by: INTERNAL MEDICINE

## 2022-09-30 PROCEDURE — 27000221 HC OXYGEN, UP TO 24 HOURS

## 2022-09-30 PROCEDURE — 36415 COLL VENOUS BLD VENIPUNCTURE: CPT | Performed by: INTERNAL MEDICINE

## 2022-09-30 RX ORDER — LIDOCAINE HYDROCHLORIDE 10 MG/ML
1 INJECTION, SOLUTION EPIDURAL; INFILTRATION; INTRACAUDAL; PERINEURAL ONCE
Status: DISCONTINUED | OUTPATIENT
Start: 2022-09-30 | End: 2022-10-04 | Stop reason: HOSPADM

## 2022-09-30 RX ORDER — PHENYLEPHRINE HCL IN 0.9% NACL 1 MG/10 ML
SYRINGE (ML) INTRAVENOUS
Status: DISCONTINUED | OUTPATIENT
Start: 2022-09-30 | End: 2022-09-30

## 2022-09-30 RX ORDER — BUDESONIDE 3 MG/1
9 CAPSULE, COATED PELLETS ORAL DAILY
Status: DISCONTINUED | OUTPATIENT
Start: 2022-09-30 | End: 2022-10-04 | Stop reason: HOSPADM

## 2022-09-30 RX ORDER — ONDANSETRON 2 MG/ML
4 INJECTION INTRAMUSCULAR; INTRAVENOUS DAILY PRN
Status: CANCELLED | OUTPATIENT
Start: 2022-09-30

## 2022-09-30 RX ORDER — LIDOCAINE HYDROCHLORIDE 20 MG/ML
INJECTION, SOLUTION EPIDURAL; INFILTRATION; INTRACAUDAL; PERINEURAL
Status: DISCONTINUED | OUTPATIENT
Start: 2022-09-30 | End: 2022-09-30

## 2022-09-30 RX ORDER — PROPOFOL 10 MG/ML
VIAL (ML) INTRAVENOUS
Status: DISCONTINUED | OUTPATIENT
Start: 2022-09-30 | End: 2022-09-30

## 2022-09-30 RX ORDER — HYDROCODONE BITARTRATE AND ACETAMINOPHEN 5; 325 MG/1; MG/1
1 TABLET ORAL EVERY 8 HOURS PRN
Status: DISCONTINUED | OUTPATIENT
Start: 2022-09-30 | End: 2022-10-04 | Stop reason: HOSPADM

## 2022-09-30 RX ORDER — ACETAMINOPHEN 10 MG/ML
1000 INJECTION, SOLUTION INTRAVENOUS ONCE
Status: CANCELLED | OUTPATIENT
Start: 2022-09-30 | End: 2022-09-30

## 2022-09-30 RX ORDER — PROPOFOL 10 MG/ML
VIAL (ML) INTRAVENOUS
Status: COMPLETED
Start: 2022-09-30 | End: 2022-09-30

## 2022-09-30 RX ORDER — SODIUM CHLORIDE, SODIUM GLUCONATE, SODIUM ACETATE, POTASSIUM CHLORIDE AND MAGNESIUM CHLORIDE 30; 37; 368; 526; 502 MG/100ML; MG/100ML; MG/100ML; MG/100ML; MG/100ML
1000 INJECTION, SOLUTION INTRAVENOUS CONTINUOUS
Status: DISCONTINUED | OUTPATIENT
Start: 2022-09-30 | End: 2022-10-04 | Stop reason: HOSPADM

## 2022-09-30 RX ORDER — MIDAZOLAM HYDROCHLORIDE 1 MG/ML
2 INJECTION INTRAMUSCULAR; INTRAVENOUS ONCE AS NEEDED
Status: DISCONTINUED | OUTPATIENT
Start: 2022-09-30 | End: 2022-10-04 | Stop reason: HOSPADM

## 2022-09-30 RX ORDER — DIPHENHYDRAMINE HYDROCHLORIDE 50 MG/ML
25 INJECTION INTRAMUSCULAR; INTRAVENOUS EVERY 6 HOURS PRN
Status: CANCELLED | OUTPATIENT
Start: 2022-09-30

## 2022-09-30 RX ADMIN — DEXTROSE MONOHYDRATE 250 ML: 100 INJECTION, SOLUTION INTRAVENOUS at 11:09

## 2022-09-30 RX ADMIN — PANTOPRAZOLE SODIUM 40 MG: 40 TABLET, DELAYED RELEASE ORAL at 04:09

## 2022-09-30 RX ADMIN — ASPIRIN 81 MG: 81 TABLET, COATED ORAL at 04:09

## 2022-09-30 RX ADMIN — ACETAMINOPHEN 650 MG: 325 TABLET ORAL at 02:09

## 2022-09-30 RX ADMIN — PROPOFOL 10 MG: 10 INJECTION, EMULSION INTRAVENOUS at 11:09

## 2022-09-30 RX ADMIN — BUDESONIDE 9 MG: 3 CAPSULE, COATED PELLETS ORAL at 04:09

## 2022-09-30 RX ADMIN — PROPOFOL 20 MG: 10 INJECTION, EMULSION INTRAVENOUS at 11:09

## 2022-09-30 RX ADMIN — LIDOCAINE HYDROCHLORIDE 50 MG: 20 INJECTION, SOLUTION INTRAVENOUS at 11:09

## 2022-09-30 RX ADMIN — Medication 100 MCG: at 11:09

## 2022-09-30 RX ADMIN — DIPHENOXYLATE HYDROCHLORIDE AND ATROPINE SULFATE 2 TABLET: 2.5; .025 TABLET ORAL at 08:09

## 2022-09-30 RX ADMIN — PROPOFOL 25 MG: 10 INJECTION, EMULSION INTRAVENOUS at 11:09

## 2022-09-30 RX ADMIN — FUROSEMIDE 20 MG: 20 TABLET ORAL at 04:09

## 2022-09-30 RX ADMIN — DIPHENOXYLATE HYDROCHLORIDE AND ATROPINE SULFATE 2 TABLET: 2.5; .025 TABLET ORAL at 04:09

## 2022-09-30 RX ADMIN — TRAMADOL HYDROCHLORIDE 50 MG: 50 TABLET, COATED ORAL at 08:09

## 2022-09-30 RX ADMIN — CIPROFLOXACIN 500 MG: 500 TABLET, FILM COATED ORAL at 04:09

## 2022-09-30 RX ADMIN — PROPOFOL 90 MG: 10 INJECTION, EMULSION INTRAVENOUS at 11:09

## 2022-09-30 NOTE — ANESTHESIA PREPROCEDURE EVALUATION
"                                                                                                             09/30/2022  Vic Meng is a 62 y.o., male with abd pain and diarrhea for colonoscopy.    "Assessment/Plan:  1. Abdominal pain, unspecified abdominal location    2. Body aches    3. Nausea    4. Diarrhea, unspecified type    5. Colitis    6. Hypotension, unspecified hypotension type    7. Acute UTI    8. DAVID (acute kidney injury)        62-year-old male with history a.m..  Presents with several complaints including lower abdominal pain, headache, weakness, decreased appetite, decreased p.o. intake times several months.  Symptoms have wax and waned over the last few months.  No GI bleeding noted.  CT negative.  Dehydrated on labs.     1. Lower abdominal pain  2. Diarrhea  3. Weight loss  4. Decreased PO intake  -Fecal WBC +  -F/u pending stool studies  -Clear liquids today  -NPO after midnight for tentative colonoscopy tomorrow, unless stool studies show infection  -IV fluids  -Supportive care for now"           Pre-op Assessment    I have reviewed the Patient Summary Reports.     I have reviewed the Nursing Notes. I have reviewed the NPO Status.   I have reviewed the Medications.     Review of Systems  Anesthesia Hx:  No problems with previous Anesthesia  Denies Family Hx of Anesthesia complications.   Denies Personal Hx of Anesthesia complications.   Cardiovascular:   Exercise tolerance: good    Pulmonary:  Pulmonary Normal    Endocrine:   Diabetes  Obesity / BMI > 30      Physical Exam  General: Well nourished, Cooperative, Alert and Oriented    Airway:  Mallampati: II   Mouth Opening: Normal  Tongue: Normal  Neck ROM: Normal ROM        Anesthesia Plan  Type of Anesthesia, risks & benefits discussed:    Anesthesia Type: Gen Natural Airway  Intra-op Monitoring Plan: Standard ASA Monitors  Post Op Pain Control Plan: multimodal analgesia  Induction:  IV  Informed Consent: Informed consent signed with the " Patient and all parties understand the risks and agree with anesthesia plan.  All questions answered.   ASA Score: 2  Day of Surgery Review of History & Physical: H&P Update referred to the surgeon/provider.    Ready For Surgery From Anesthesia Perspective.     .

## 2022-09-30 NOTE — PROVATION PATIENT INSTRUCTIONS
Discharge Summary/Instructions after an Endoscopic Procedure  Patient Name: Vic Meng  Patient MRN: [id number]  Patient YOB: 1960 Friday, September 30, 2022  Blayne Verma MD  Dear patient,  As a result of recent federal legislation (The Federal Cures Act), you may   receive lab or pathology results from your procedure in your [facility]   account before your physician is able to contact you. Your physician or   their representative will relay the results to you with their   recommendations at their soonest availability.  Thank you,  RESTRICTIONS:  During your procedure today, you received medications for sedation.  These   medications may affect your judgment, balance and coordination.  Therefore,   for 24 hours, you have the following restrictions:   - DO NOT drive a car, operate machinery, make legal/financial decisions,   sign important papers or drink alcohol.    ACTIVITY:  Today: no heavy lifting, straining or running due to procedural   sedation/anesthesia.  The following day: return to full activity including work.  DIET:  Eat and drink normally unless instructed otherwise.     TREATMENT FOR COMMON SIDE EFFECTS:  - Mild abdominal pain, nausea, belching, bloating or excessive gas:  rest,   eat lightly and use a heating pad.  - Sore Throat: treat with throat lozenges and/or gargle with warm salt   water.  - Because air was used during the procedure, expelling large amounts of air   from your rectum or belching is normal.  - If a bowel prep was taken, you may not have a bowel movement for 1-3 days.    This is normal.  SYMPTOMS TO WATCH FOR AND REPORT TO YOUR PHYSICIAN:  1. Abdominal pain or bloating, other than gas cramps.  2. Chest pain.  3. Back pain.  4. Signs of infection such as: chills or fever occurring within 24 hours   after the procedure.  5. Rectal bleeding, which would show as bright red, maroon, or black stools.   (A tablespoon of blood from the rectum is not serious,  especially if   hemorrhoids are present.)  6. Vomiting.  7. Weakness or dizziness.  GO DIRECTLY TO THE NEAREST EMERGENCY ROOM IF YOU HAVE ANY OF THE FOLLOWING:      Difficulty breathing              Chills and/or fever over 101 F   Persistent vomiting and/or vomiting blood   Severe abdominal pain   Severe chest pain   Black, tarry stools   Bleeding- more than one tablespoon   Any other symptom or condition that you feel may need urgent attention  Your doctor recommends these additional instructions:  If any biopsies were taken, your doctors clinic will contact you in 1 to 2   weeks with any results.  - Await pathology results.   -Awiat final stool studies  -check crp  -soft low residue diet.    -OVerall, if this is not infectious colitis, then would suspect more crohns   dz.  IT has more of a chronic presentation which is why i would favor   crohns.   -will check on him in am  -will likely need to get him on stelara as outpt  For questions, problems or results please call your physician - Blayne Verma MD at Work:  [phone].  [facility], EMERGENCY ROOM PHONE NUMBER: [phone]  IF A COMPLICATION OR EMERGENCY SITUATION ARISES AND YOU ARE UNABLE TO REACH   YOUR PHYSICIAN - GO DIRECTLY TO THE EMERGENCY ROOM.  MD Blayne Jeronimo MD  9/30/2022 11:59:29 AM  This report has been verified and signed electronically.  Dear patient,  As a result of recent federal legislation (The Federal Cures Act), you may   receive lab or pathology results from your procedure in your [facility]   account before your physician is able to contact you. Your physician or   their representative will relay the results to you with their   recommendations at their soonest availability.  Thank you,  PROVATION

## 2022-09-30 NOTE — TRANSFER OF CARE
"Anesthesia Transfer of Care Note    Patient: Vic Meng    Procedure(s) Performed: Procedure(s) (LRB):  COLON (N/A)    Patient location: GI    Anesthesia Type: MAC    Transport from OR: Transported from OR on room air with adequate spontaneous ventilation    Post pain: adequate analgesia    Post assessment: no apparent anesthetic complications    Post vital signs: stable    Level of consciousness: awake and sedated    Nausea/Vomiting: no nausea/vomiting    Complications: none    Transfer of care protocol was followed      Last vitals:   Visit Vitals  /85 (BP Location: Left arm, Patient Position: Lying)   Pulse 99   Temp 36.2 °C (97.2 °F) (Tympanic)   Resp 19   Ht 6' 2" (1.88 m)   Wt 111.1 kg (245 lb)   SpO2 99%   BMI 31.46 kg/m²     "

## 2022-09-30 NOTE — PROGRESS NOTES
[facility]                       [address] [address]    PATIENT NAME:       ЮЛИЯ NUNEZ   YOB: 1960  CSN:                [id number]   MRN:                [id number]  ADMIT DATE:         09/27/2022 17:31:00  PHYSICIAN:          Sarabjit Arreaga MD                            PROGRESS NOTE    DATE:  09/30/2022 00:00:00    Patient, a 62-year-old  male, came to the hospital because of   abdominal pain, recurrent. visits  to the emergency room and primary clinic   complaining of abdominal pain.  However, the patient apparently had a history of   having colitis.  At this time, we admitted the patient and we had GI involved   and a colonoscopy was done, biopsy was taken.  There is a suspicion for colon   disease, but biopsy is not back yet.  Therefore, we will start the patient on   some type of diet today.  Upon examination today, he was complaining of severe   headache, and she did give him some Tylenol; he claimed that it was not helping   the headache.    PHYSICAL EXAMINATION:  HEART:  Patient has S1, S2.  No murmur or gallop.  CHEST:  is clear.  No wheezing or rales.  ABDOMEN:  Bowel sounds present.  Minimal tenderness on palpation in the   periumbilical area.    EXTREMITIES:  Superior and inferior, good range of motion.    IMPRESSION:    1. Colitis.    2. Abdominal pain.    3. Diarrhea.    4. Diabetes mellitus, type 2.    5. Arthritis.    6. Hypertension.    PLAN:  We are going to resume.  The patient started on diet recommended by GI   and we will see what happens over the weekend.      ______________________________  Sarabjit Arreaga MD    CHL/AQS  DD:  09/30/2022  Time:  06:05PM  DT:  09/30/2022  Time:  06:22PM  Job #:  [id number]      PROGRESS NOTE

## 2022-10-01 PROBLEM — K52.9 COLITIS: Status: ACTIVE | Noted: 2022-10-01

## 2022-10-01 LAB
ALBUMIN SERPL-MCNC: 2.4 GM/DL (ref 3.4–4.8)
ALBUMIN/GLOB SERPL: 0.8 RATIO (ref 1.1–2)
ALP SERPL-CCNC: 48 UNIT/L (ref 40–150)
ALT SERPL-CCNC: <5 UNIT/L (ref 0–55)
AST SERPL-CCNC: 7 UNIT/L (ref 5–34)
BASOPHILS # BLD AUTO: 0.05 X10(3)/MCL (ref 0–0.2)
BASOPHILS NFR BLD AUTO: 0.6 %
BILIRUBIN DIRECT+TOT PNL SERPL-MCNC: 0.4 MG/DL
BUN SERPL-MCNC: 12.6 MG/DL (ref 8.4–25.7)
CALCIUM SERPL-MCNC: 8.6 MG/DL (ref 8.8–10)
CHLORIDE SERPL-SCNC: 109 MMOL/L (ref 98–107)
CO2 SERPL-SCNC: 21 MMOL/L (ref 23–31)
CREAT SERPL-MCNC: 0.84 MG/DL (ref 0.73–1.18)
EOSINOPHIL # BLD AUTO: 0.05 X10(3)/MCL (ref 0–0.9)
EOSINOPHIL NFR BLD AUTO: 0.6 %
ERYTHROCYTE [DISTWIDTH] IN BLOOD BY AUTOMATED COUNT: 14.6 % (ref 11.5–17)
GFR SERPLBLD CREATININE-BSD FMLA CKD-EPI: >60 MLS/MIN/1.73/M2
GLOBULIN SER-MCNC: 3.2 GM/DL (ref 2.4–3.5)
GLUCOSE SERPL-MCNC: 110 MG/DL (ref 82–115)
HCT VFR BLD AUTO: 29.6 % (ref 42–52)
HGB BLD-MCNC: 9.4 GM/DL (ref 14–18)
IMM GRANULOCYTES # BLD AUTO: 0.05 X10(3)/MCL (ref 0–0.04)
IMM GRANULOCYTES NFR BLD AUTO: 0.6 %
LYMPHOCYTES # BLD AUTO: 0.97 X10(3)/MCL (ref 0.6–4.6)
LYMPHOCYTES NFR BLD AUTO: 10.7 %
MCH RBC QN AUTO: 29.1 PG (ref 27–31)
MCHC RBC AUTO-ENTMCNC: 31.8 MG/DL (ref 33–36)
MCV RBC AUTO: 91.6 FL (ref 80–94)
MONOCYTES # BLD AUTO: 0.92 X10(3)/MCL (ref 0.1–1.3)
MONOCYTES NFR BLD AUTO: 10.1 %
NEUTROPHILS # BLD AUTO: 7.1 X10(3)/MCL (ref 2.1–9.2)
NEUTROPHILS NFR BLD AUTO: 77.4 %
NRBC BLD AUTO-RTO: 0 %
PLATELET # BLD AUTO: 408 X10(3)/MCL (ref 130–400)
PMV BLD AUTO: 10 FL (ref 7.4–10.4)
POTASSIUM SERPL-SCNC: 5 MMOL/L (ref 3.5–5.1)
PROT SERPL-MCNC: 5.6 GM/DL (ref 5.8–7.6)
RBC # BLD AUTO: 3.23 X10(6)/MCL (ref 4.7–6.1)
SODIUM SERPL-SCNC: 138 MMOL/L (ref 136–145)
WBC # SPEC AUTO: 9.1 X10(3)/MCL (ref 4.5–11.5)

## 2022-10-01 PROCEDURE — 25000003 PHARM REV CODE 250: Performed by: INTERNAL MEDICINE

## 2022-10-01 PROCEDURE — 86480 TB TEST CELL IMMUN MEASURE: CPT | Performed by: INTERNAL MEDICINE

## 2022-10-01 PROCEDURE — 25000003 PHARM REV CODE 250: Performed by: EMERGENCY MEDICINE

## 2022-10-01 PROCEDURE — 36415 COLL VENOUS BLD VENIPUNCTURE: CPT | Performed by: INTERNAL MEDICINE

## 2022-10-01 PROCEDURE — 25000242 PHARM REV CODE 250 ALT 637 W/ HCPCS: Performed by: INTERNAL MEDICINE

## 2022-10-01 PROCEDURE — 80053 COMPREHEN METABOLIC PANEL: CPT | Performed by: INTERNAL MEDICINE

## 2022-10-01 PROCEDURE — 21400001 HC TELEMETRY ROOM

## 2022-10-01 PROCEDURE — 85025 COMPLETE CBC W/AUTO DIFF WBC: CPT | Performed by: INTERNAL MEDICINE

## 2022-10-01 RX ADMIN — BUDESONIDE 6 MG: 3 CAPSULE, COATED PELLETS ORAL at 08:10

## 2022-10-01 RX ADMIN — HYDROCHLOROTHIAZIDE 25 MG: 25 TABLET ORAL at 08:10

## 2022-10-01 RX ADMIN — DIPHENOXYLATE HYDROCHLORIDE AND ATROPINE SULFATE 2 TABLET: 2.5; .025 TABLET ORAL at 03:10

## 2022-10-01 RX ADMIN — DIPHENOXYLATE HYDROCHLORIDE AND ATROPINE SULFATE 2 TABLET: 2.5; .025 TABLET ORAL at 08:10

## 2022-10-01 RX ADMIN — TRAMADOL HYDROCHLORIDE 50 MG: 50 TABLET, COATED ORAL at 08:10

## 2022-10-01 RX ADMIN — CIPROFLOXACIN 500 MG: 500 TABLET, FILM COATED ORAL at 08:10

## 2022-10-01 RX ADMIN — PANTOPRAZOLE SODIUM 40 MG: 40 TABLET, DELAYED RELEASE ORAL at 08:10

## 2022-10-01 RX ADMIN — ASPIRIN 81 MG: 81 TABLET, COATED ORAL at 08:10

## 2022-10-01 RX ADMIN — LISINOPRIL 20 MG: 20 TABLET ORAL at 08:10

## 2022-10-01 RX ADMIN — FUROSEMIDE 20 MG: 20 TABLET ORAL at 08:10

## 2022-10-01 RX ADMIN — BUDESONIDE 3 MG: 3 CAPSULE, COATED PELLETS ORAL at 10:10

## 2022-10-01 RX ADMIN — FLUTICASONE FUROATE AND VILANTEROL TRIFENATATE 1 PUFF: 100; 25 POWDER RESPIRATORY (INHALATION) at 11:10

## 2022-10-01 RX ADMIN — MELATONIN TAB 3 MG 6 MG: 3 TAB at 08:10

## 2022-10-01 NOTE — PROGRESS NOTES
[facility] - 5th Floor [facility] Medicine  Progress Note    Patient Name: Vic Meng  MRN: [id number]  Patient Class: IP- Inpatient   Admission Date: 9/27/2022  Length of Stay: 4 days  Attending Physician: Sarabjit Arreaga MD  Primary Care Provider: Sarabjit Arreaga MD        Subjective:     Principal Problem:Colitis        HPI:  62 year old Black Male reported to the hospital due to abdominal pain.  Patient was admitted to the hospital for further evaluation and treatment.  Patient was diagnosed with Colitis and a UTI and was started on medical treatment.      Overview/Hospital Course:  62 year old Black Male was admitted to [facility] due to abdominal pain.  Patient was diagnosed with Colitis and a UTI.  Patient was started on medical treatment and is responding well.      Interval History:     Review of Systems   Constitutional:  Negative for activity change, appetite change, chills, diaphoresis, fatigue, fever and unexpected weight change.   HENT:  Negative for congestion, dental problem, drooling, ear discharge, ear pain, facial swelling, hearing loss, mouth sores, nosebleeds, postnasal drip, rhinorrhea, sinus pressure, sinus pain, sneezing, sore throat, tinnitus, trouble swallowing and voice change.    Eyes:  Negative for photophobia, pain, discharge, redness, itching and visual disturbance.   Respiratory:  Negative for apnea, cough, choking, chest tightness, shortness of breath, wheezing and stridor.    Cardiovascular:  Negative for chest pain, palpitations and leg swelling.   Gastrointestinal:  Negative for abdominal distention, abdominal pain, anal bleeding, blood in stool, constipation, diarrhea, nausea, rectal pain and vomiting.   Endocrine: Negative for cold intolerance, heat intolerance, polydipsia, polyphagia and polyuria.   Genitourinary:  Negative for decreased urine volume, difficulty urinating, dysuria, enuresis, flank pain, frequency, genital sores, hematuria and urgency.    Musculoskeletal:  Negative for arthralgias, back pain, gait problem, joint swelling, myalgias, neck pain and neck stiffness.   Skin:  Negative for color change, pallor, rash and wound.   Allergic/Immunologic: Negative for environmental allergies, food allergies and immunocompromised state.   Neurological:  Negative for dizziness, tremors, seizures, syncope, facial asymmetry, speech difficulty, weakness, light-headedness, numbness and headaches.   Hematological:  Negative for adenopathy. Does not bruise/bleed easily.   Psychiatric/Behavioral:  Negative for agitation, behavioral problems, confusion, decreased concentration, dysphoric mood, hallucinations, self-injury, sleep disturbance and suicidal ideas. The patient is not nervous/anxious and is not hyperactive.    Objective:     Vital Signs (Most Recent):  Temp: 98.8 °F (37.1 °C) (10/01/22 1200)  Pulse: 92 (10/01/22 1200)  Resp: 19 (10/01/22 1105)  BP: 108/74 (10/01/22 1200)  SpO2: 100 % (10/01/22 1200) Vital Signs (24h Range):  Temp:  [97.9 °F (36.6 °C)-98.8 °F (37.1 °C)] 98.8 °F (37.1 °C)  Pulse:  [] 92  Resp:  [18-19] 19  SpO2:  [97 %-100 %] 100 %  BP: ()/(56-74) 108/74     Weight: 111.1 kg (245 lb)  Body mass index is 31.46 kg/m².    Intake/Output Summary (Last 24 hours) at 10/1/2022 1311  Last data filed at 10/1/2022 0640  Gross per 24 hour   Intake 600 ml   Output 600 ml   Net 0 ml      Physical Exam  Constitutional:       Appearance: Normal appearance.   HENT:      Head: Normocephalic and atraumatic.      Nose: Nose normal.      Mouth/Throat:      Mouth: Mucous membranes are moist.      Pharynx: Oropharynx is clear.   Eyes:      Extraocular Movements: Extraocular movements intact.      Conjunctiva/sclera: Conjunctivae normal.      Pupils: Pupils are equal, round, and reactive to light.   Cardiovascular:      Rate and Rhythm: Normal rate and regular rhythm.      Pulses: Normal pulses.      Heart sounds: Normal heart sounds.   Pulmonary:       Effort: Pulmonary effort is normal.      Breath sounds: Normal breath sounds.   Abdominal:      General: Abdomen is flat. Bowel sounds are normal.      Palpations: Abdomen is soft.   Musculoskeletal:         General: Normal range of motion.      Cervical back: Normal range of motion and neck supple.   Skin:     General: Skin is warm and dry.   Neurological:      General: No focal deficit present.      Mental Status: He is alert and oriented to person, place, and time. Mental status is at baseline.       Significant Labs: All pertinent labs within the past 24 hours have been reviewed.    Significant Imaging: I have reviewed all pertinent imaging results/findings within the past 24 hours.      Assessment/Plan:      No notes have been filed under this hospital service.  Service: Hospital Medicine    VTE Risk Mitigation (From admission, onward)         Ordered     IP VTE HIGH RISK PATIENT  Once         09/28/22 0042     Place sequential compression device  Until discontinued         09/28/22 0042                Discharge Planning   DAISY:      Code Status: Full Code   Is the patient medically ready for discharge?:     Reason for patient still in hospital (select all that apply): Patient trending condition                     Behzad You Sr, MD  Department of Hospital Medicine   [facility] - 5th Floor Med Surg

## 2022-10-01 NOTE — HOSPITAL COURSE
62 year old Black Male was admitted to [facility] due to abdominal pain.  Patient was diagnosed with Crohn's Colitis and a UTI.  Patient was started on medical treatment and is responding well.

## 2022-10-01 NOTE — HPI
62 year old Black Male reported to the hospital due to abdominal pain.  Patient was admitted to the hospital for further evaluation and treatment.  Patient was diagnosed with Crohn's Colitis and a UTI and was started on medical treatment.    Patient is feeling much better. Decreased abdominal pain.

## 2022-10-01 NOTE — PROGRESS NOTES
"[facility]    CC: colitis    HPI 62 y.o. male seen and examined. No issues overnight diarrhea has largely resolved.     Past Medical History  Past Medical History:   Diagnosis Date    Diabetes mellitus          Review of Systems  General ROS: negative for chills, fever or weight loss  Cardiovascular ROS: no chest pain or dyspnea on exertion  Gastrointestinal ROS: no abdominal pain, change in bowel habits, or black/ bloody stools    Physical Examination  /74   Pulse 92   Temp 98.8 °F (37.1 °C) (Oral)   Resp 19   Ht 6' 2" (1.88 m)   Wt 111.1 kg (245 lb)   SpO2 100%   BMI 31.46 kg/m²   General appearance: alert, cooperative, no distress  HENT: Normocephalic, atraumatic, neck symmetrical, no nasal discharge   Lungs: clear to auscultation bilaterally, no dullness to percussion bilaterally  Heart: regular rate and rhythm without rub; no displacement of the PMI   Abdomen: soft, non-tender; bowel sounds normoactive; no organomegaly  Extremities: extremities symmetric; no clubbing, cyanosis, or edema  Neurologic: Alert and oriented X 3, normal strength, normal coordination and gait    Labs:  Lab Results   Component Value Date    WBC 9.1 10/01/2022    HGB 9.4 (L) 10/01/2022    HCT 29.6 (L) 10/01/2022    MCV 91.6 10/01/2022     (H) 10/01/2022           Imaging:    I have personally reviewed and interpreted these images.    Assessment:   Colitis- infectious vs. Inflammatory.  Await biopsies and will certainly need follow up    Plan:  Diet as tolerated  Continue abx  Follow up biopsies      ABRAN Shah Jr., M.D.  [facility]    "

## 2022-10-01 NOTE — SUBJECTIVE & OBJECTIVE
Interval History:     Review of Systems   Constitutional:  Negative for activity change, appetite change, chills, diaphoresis, fatigue, fever and unexpected weight change.   HENT:  Negative for congestion, dental problem, drooling, ear discharge, ear pain, facial swelling, hearing loss, mouth sores, nosebleeds, postnasal drip, rhinorrhea, sinus pressure, sinus pain, sneezing, sore throat, tinnitus, trouble swallowing and voice change.    Eyes:  Negative for photophobia, pain, discharge, redness, itching and visual disturbance.   Respiratory:  Negative for apnea, cough, choking, chest tightness, shortness of breath, wheezing and stridor.    Cardiovascular:  Negative for chest pain, palpitations and leg swelling.   Gastrointestinal:  Negative for abdominal distention, abdominal pain, anal bleeding, blood in stool, constipation, diarrhea, nausea, rectal pain and vomiting.   Endocrine: Negative for cold intolerance, heat intolerance, polydipsia, polyphagia and polyuria.   Genitourinary:  Negative for decreased urine volume, difficulty urinating, dysuria, enuresis, flank pain, frequency, genital sores, hematuria and urgency.   Musculoskeletal:  Negative for arthralgias, back pain, gait problem, joint swelling, myalgias, neck pain and neck stiffness.   Skin:  Negative for color change, pallor, rash and wound.   Allergic/Immunologic: Negative for environmental allergies, food allergies and immunocompromised state.   Neurological:  Negative for dizziness, tremors, seizures, syncope, facial asymmetry, speech difficulty, weakness, light-headedness, numbness and headaches.   Hematological:  Negative for adenopathy. Does not bruise/bleed easily.   Psychiatric/Behavioral:  Negative for agitation, behavioral problems, confusion, decreased concentration, dysphoric mood, hallucinations, self-injury, sleep disturbance and suicidal ideas. The patient is not nervous/anxious and is not hyperactive.    Objective:     Vital Signs (Most  Recent):  Temp: 98.8 °F (37.1 °C) (10/01/22 1200)  Pulse: 92 (10/01/22 1200)  Resp: 19 (10/01/22 1105)  BP: 108/74 (10/01/22 1200)  SpO2: 100 % (10/01/22 1200) Vital Signs (24h Range):  Temp:  [97.9 °F (36.6 °C)-98.8 °F (37.1 °C)] 98.8 °F (37.1 °C)  Pulse:  [] 92  Resp:  [18-19] 19  SpO2:  [97 %-100 %] 100 %  BP: ()/(56-74) 108/74     Weight: 111.1 kg (245 lb)  Body mass index is 31.46 kg/m².    Intake/Output Summary (Last 24 hours) at 10/1/2022 1311  Last data filed at 10/1/2022 0640  Gross per 24 hour   Intake 600 ml   Output 600 ml   Net 0 ml      Physical Exam  Constitutional:       Appearance: Normal appearance.   HENT:      Head: Normocephalic and atraumatic.      Nose: Nose normal.      Mouth/Throat:      Mouth: Mucous membranes are moist.      Pharynx: Oropharynx is clear.   Eyes:      Extraocular Movements: Extraocular movements intact.      Conjunctiva/sclera: Conjunctivae normal.      Pupils: Pupils are equal, round, and reactive to light.   Cardiovascular:      Rate and Rhythm: Normal rate and regular rhythm.      Pulses: Normal pulses.      Heart sounds: Normal heart sounds.   Pulmonary:      Effort: Pulmonary effort is normal.      Breath sounds: Normal breath sounds.   Abdominal:      General: Abdomen is flat. Bowel sounds are normal.      Palpations: Abdomen is soft.   Musculoskeletal:         General: Normal range of motion.      Cervical back: Normal range of motion and neck supple.   Skin:     General: Skin is warm and dry.   Neurological:      General: No focal deficit present.      Mental Status: He is alert and oriented to person, place, and time. Mental status is at baseline.       Significant Labs: All pertinent labs within the past 24 hours have been reviewed.    Significant Imaging: I have reviewed all pertinent imaging results/findings within the past 24 hours.

## 2022-10-02 PROBLEM — K50.10 CROHN'S COLITIS: Status: ACTIVE | Noted: 2022-10-01

## 2022-10-02 PROBLEM — N39.0 UTI (URINARY TRACT INFECTION): Status: ACTIVE | Noted: 2022-10-02

## 2022-10-02 PROBLEM — K52.9 COLITIS: Status: RESOLVED | Noted: 2022-10-01 | Resolved: 2022-10-02

## 2022-10-02 LAB
ADENOVIRUS F 40/41 (PREMIER): NOT DETECTED
ASTROVIRUS (PREMIER): NOT DETECTED
CAMPYLOBACTER (PREMIER): NOT DETECTED
CRYPTOSPORIDIUM (PREMIER): NOT DETECTED
CYCLOSPORA CAYETANENSIS (PREMIER): NOT DETECTED
E. COLI O157 (PREMIER): NOT DETECTED
ENTAMOEBA HISTOLYTICA (PREMIER): NOT DETECTED
ENTEROAGGREGATIVE E. COLI (EAEC) (PREMIER): NOT DETECTED
ENTEROPATHOGENIC E. COLI (EPEC) (PREMIER): NOT DETECTED
ENTEROTOXIGENIC E. COLI (ETEC) LT/ST (PREMIER): NOT DETECTED
GIARDIA LAMBLIA (PREMIER): NOT DETECTED
HAV IGM SERPL QL IA: NONREACTIVE
HBV CORE IGM SERPL QL IA: NONREACTIVE
HBV SURFACE AG SERPL QL IA: NONREACTIVE
HCV AB SERPL QL IA: NONREACTIVE
NOROVIRUS GI/GII (PREMIER): NOT DETECTED
PLESIOMONAS SHIGELLOIDES (PREMIER): NOT DETECTED
POCT GLUCOSE: 98 MG/DL (ref 70–110)
ROTAVIRUS A (PREMIER): NOT DETECTED
SALMONELLA (PREMIER): NOT DETECTED
SAPOVIRUS (PREMIER): NOT DETECTED
SHIGA-LIKE TOXIN-PRODUCING E. COLI (STEC) STX1/STX2 (PREMIER): NOT DETECTED
SHIGELLA/ENTERINVASIVE E. COLI (EIEC) (PREMIER): NOT DETECTED
VIBRIO (PREMIER): NOT DETECTED
VIBRIO CHOLERA (PREMIER): NOT DETECTED
YERSINIA ENTEROCOLITICA (PREMIER): NOT DETECTED

## 2022-10-02 PROCEDURE — 21400001 HC TELEMETRY ROOM

## 2022-10-02 PROCEDURE — 25000003 PHARM REV CODE 250: Performed by: INTERNAL MEDICINE

## 2022-10-02 PROCEDURE — 25000242 PHARM REV CODE 250 ALT 637 W/ HCPCS: Performed by: INTERNAL MEDICINE

## 2022-10-02 RX ADMIN — CIPROFLOXACIN 500 MG: 500 TABLET, FILM COATED ORAL at 08:10

## 2022-10-02 RX ADMIN — FUROSEMIDE 20 MG: 20 TABLET ORAL at 08:10

## 2022-10-02 RX ADMIN — TRAMADOL HYDROCHLORIDE 50 MG: 50 TABLET, COATED ORAL at 08:10

## 2022-10-02 RX ADMIN — PANTOPRAZOLE SODIUM 40 MG: 40 TABLET, DELAYED RELEASE ORAL at 08:10

## 2022-10-02 RX ADMIN — ASPIRIN 81 MG: 81 TABLET, COATED ORAL at 08:10

## 2022-10-02 RX ADMIN — HYDROCHLOROTHIAZIDE 25 MG: 25 TABLET ORAL at 08:10

## 2022-10-02 RX ADMIN — DIPHENOXYLATE HYDROCHLORIDE AND ATROPINE SULFATE 2 TABLET: 2.5; .025 TABLET ORAL at 08:10

## 2022-10-02 RX ADMIN — NINTEDANIB 150 MG: 150 CAPSULE ORAL at 08:10

## 2022-10-02 RX ADMIN — BUDESONIDE 9 MG: 3 CAPSULE, COATED PELLETS ORAL at 08:10

## 2022-10-02 RX ADMIN — FLUTICASONE FUROATE AND VILANTEROL TRIFENATATE 1 PUFF: 100; 25 POWDER RESPIRATORY (INHALATION) at 08:10

## 2022-10-02 NOTE — SUBJECTIVE & OBJECTIVE
Interval History:     Review of Systems   Constitutional:  Negative for activity change, appetite change, chills, diaphoresis, fatigue, fever and unexpected weight change.   HENT:  Negative for congestion, dental problem, drooling, ear discharge, ear pain, facial swelling, hearing loss, mouth sores, nosebleeds, postnasal drip, rhinorrhea, sinus pressure, sinus pain, sneezing, sore throat, tinnitus, trouble swallowing and voice change.    Eyes:  Negative for photophobia, pain, discharge, redness, itching and visual disturbance.   Respiratory:  Negative for apnea, cough, choking, chest tightness, shortness of breath, wheezing and stridor.    Cardiovascular:  Negative for chest pain, palpitations and leg swelling.   Gastrointestinal:  Negative for abdominal distention, abdominal pain, anal bleeding, blood in stool, constipation, diarrhea, nausea, rectal pain and vomiting.   Endocrine: Negative for cold intolerance, heat intolerance, polydipsia, polyphagia and polyuria.   Genitourinary:  Negative for decreased urine volume, difficulty urinating, dysuria, enuresis, flank pain, frequency, genital sores, hematuria and urgency.   Musculoskeletal:  Negative for arthralgias, back pain, gait problem, joint swelling, myalgias, neck pain and neck stiffness.   Skin:  Negative for color change, pallor, rash and wound.   Allergic/Immunologic: Negative for environmental allergies, food allergies and immunocompromised state.   Neurological:  Negative for dizziness, tremors, seizures, syncope, facial asymmetry, speech difficulty, weakness, light-headedness, numbness and headaches.   Hematological:  Negative for adenopathy. Does not bruise/bleed easily.   Psychiatric/Behavioral:  Negative for agitation, behavioral problems, confusion, decreased concentration, dysphoric mood, hallucinations, self-injury, sleep disturbance and suicidal ideas. The patient is not nervous/anxious and is not hyperactive.    Objective:     Vital Signs (Most  Recent):  Temp: 98.4 °F (36.9 °C) (10/02/22 1300)  Pulse: 90 (10/02/22 1300)  Resp: 19 (10/02/22 0843)  BP: 93/60 (10/02/22 1300)  SpO2: 97 % (10/02/22 1300) Vital Signs (24h Range):  Temp:  [98 °F (36.7 °C)-98.4 °F (36.9 °C)] 98.4 °F (36.9 °C)  Pulse:  [] 90  Resp:  [18-20] 19  SpO2:  [96 %-100 %] 97 %  BP: ()/(59-68) 93/60     Weight: 111.1 kg (245 lb)  Body mass index is 31.46 kg/m².    Intake/Output Summary (Last 24 hours) at 10/2/2022 1341  Last data filed at 10/1/2022 2100  Gross per 24 hour   Intake 360 ml   Output 400 ml   Net -40 ml      Physical Exam  Constitutional:       Appearance: Normal appearance.   HENT:      Head: Normocephalic and atraumatic.      Nose: Nose normal.      Mouth/Throat:      Mouth: Mucous membranes are moist.      Pharynx: Oropharynx is clear.   Eyes:      Extraocular Movements: Extraocular movements intact.      Conjunctiva/sclera: Conjunctivae normal.      Pupils: Pupils are equal, round, and reactive to light.   Cardiovascular:      Rate and Rhythm: Normal rate and regular rhythm.      Pulses: Normal pulses.      Heart sounds: Normal heart sounds.   Pulmonary:      Effort: Pulmonary effort is normal.      Breath sounds: Normal breath sounds.   Abdominal:      General: Abdomen is flat. Bowel sounds are normal.      Palpations: Abdomen is soft.   Musculoskeletal:         General: Normal range of motion.      Cervical back: Normal range of motion and neck supple.   Skin:     General: Skin is warm and dry.   Neurological:      General: No focal deficit present.      Mental Status: He is alert and oriented to person, place, and time. Mental status is at baseline.       Significant Labs: All pertinent labs within the past 24 hours have been reviewed.    Significant Imaging: I have reviewed all pertinent imaging results/findings within the past 24 hours.

## 2022-10-02 NOTE — PROGRESS NOTES
[facility] - 5th Floor [facility] Medicine  Progress Note    Patient Name: Vic Meng  MRN: [id number]  Patient Class: IP- Inpatient   Admission Date: 9/27/2022  Length of Stay: 5 days  Attending Physician: Sarabjit Arreaga MD  Primary Care Provider: Sarabjit Arreaga MD        Subjective:     Principal Problem:Crohn's colitis        HPI:  62 year old Black Male reported to the hospital due to abdominal pain.  Patient was admitted to the hospital for further evaluation and treatment.  Patient was diagnosed with Crohn's Colitis and a UTI and was started on medical treatment.    Patient is feeling much better. Decreased abdominal pain.      Overview/Hospital Course:  62 year old Black Male was admitted to [facility] due to abdominal pain.  Patient was diagnosed with Crohn's Colitis and a UTI.  Patient was started on medical treatment and is responding well.      Interval History:     Review of Systems   Constitutional:  Negative for activity change, appetite change, chills, diaphoresis, fatigue, fever and unexpected weight change.   HENT:  Negative for congestion, dental problem, drooling, ear discharge, ear pain, facial swelling, hearing loss, mouth sores, nosebleeds, postnasal drip, rhinorrhea, sinus pressure, sinus pain, sneezing, sore throat, tinnitus, trouble swallowing and voice change.    Eyes:  Negative for photophobia, pain, discharge, redness, itching and visual disturbance.   Respiratory:  Negative for apnea, cough, choking, chest tightness, shortness of breath, wheezing and stridor.    Cardiovascular:  Negative for chest pain, palpitations and leg swelling.   Gastrointestinal:  Negative for abdominal distention, abdominal pain, anal bleeding, blood in stool, constipation, diarrhea, nausea, rectal pain and vomiting.   Endocrine: Negative for cold intolerance, heat intolerance, polydipsia, polyphagia and polyuria.   Genitourinary:  Negative for decreased urine volume, difficulty urinating,  dysuria, enuresis, flank pain, frequency, genital sores, hematuria and urgency.   Musculoskeletal:  Negative for arthralgias, back pain, gait problem, joint swelling, myalgias, neck pain and neck stiffness.   Skin:  Negative for color change, pallor, rash and wound.   Allergic/Immunologic: Negative for environmental allergies, food allergies and immunocompromised state.   Neurological:  Negative for dizziness, tremors, seizures, syncope, facial asymmetry, speech difficulty, weakness, light-headedness, numbness and headaches.   Hematological:  Negative for adenopathy. Does not bruise/bleed easily.   Psychiatric/Behavioral:  Negative for agitation, behavioral problems, confusion, decreased concentration, dysphoric mood, hallucinations, self-injury, sleep disturbance and suicidal ideas. The patient is not nervous/anxious and is not hyperactive.    Objective:     Vital Signs (Most Recent):  Temp: 98.4 °F (36.9 °C) (10/02/22 1300)  Pulse: 90 (10/02/22 1300)  Resp: 19 (10/02/22 0843)  BP: 93/60 (10/02/22 1300)  SpO2: 97 % (10/02/22 1300) Vital Signs (24h Range):  Temp:  [98 °F (36.7 °C)-98.4 °F (36.9 °C)] 98.4 °F (36.9 °C)  Pulse:  [] 90  Resp:  [18-20] 19  SpO2:  [96 %-100 %] 97 %  BP: ()/(59-68) 93/60     Weight: 111.1 kg (245 lb)  Body mass index is 31.46 kg/m².    Intake/Output Summary (Last 24 hours) at 10/2/2022 1341  Last data filed at 10/1/2022 2100  Gross per 24 hour   Intake 360 ml   Output 400 ml   Net -40 ml      Physical Exam  Constitutional:       Appearance: Normal appearance.   HENT:      Head: Normocephalic and atraumatic.      Nose: Nose normal.      Mouth/Throat:      Mouth: Mucous membranes are moist.      Pharynx: Oropharynx is clear.   Eyes:      Extraocular Movements: Extraocular movements intact.      Conjunctiva/sclera: Conjunctivae normal.      Pupils: Pupils are equal, round, and reactive to light.   Cardiovascular:      Rate and Rhythm: Normal rate and regular rhythm.      Pulses:  Normal pulses.      Heart sounds: Normal heart sounds.   Pulmonary:      Effort: Pulmonary effort is normal.      Breath sounds: Normal breath sounds.   Abdominal:      General: Abdomen is flat. Bowel sounds are normal.      Palpations: Abdomen is soft.   Musculoskeletal:         General: Normal range of motion.      Cervical back: Normal range of motion and neck supple.   Skin:     General: Skin is warm and dry.   Neurological:      General: No focal deficit present.      Mental Status: He is alert and oriented to person, place, and time. Mental status is at baseline.       Significant Labs: All pertinent labs within the past 24 hours have been reviewed.    Significant Imaging: I have reviewed all pertinent imaging results/findings within the past 24 hours.      Assessment/Plan:      No notes have been filed under this hospital service.  Service: Hospital Medicine    VTE Risk Mitigation (From admission, onward)         Ordered     IP VTE HIGH RISK PATIENT  Once         09/28/22 0042     Place sequential compression device  Until discontinued         09/28/22 0042                Discharge Planning   DAISY:      Code Status: Full Code   Is the patient medically ready for discharge?:     Reason for patient still in hospital (select all that apply): Patient trending condition                     Behzad You Sr, MD  Department of Hospital Medicine   [facility] - 5th Floor Med Surg

## 2022-10-03 LAB
BACTERIA BLD CULT: NORMAL
BACTERIA BLD CULT: NORMAL
ESTROGEN SERPL-MCNC: NORMAL PG/ML
INSULIN SERPL-ACNC: NORMAL U[IU]/ML
LAB AP CLINICAL INFORMATION: NORMAL
LAB AP GROSS DESCRIPTION: NORMAL
LAB AP REPORT FOOTNOTES: NORMAL
T3RU NFR SERPL: NORMAL %

## 2022-10-03 PROCEDURE — 25000242 PHARM REV CODE 250 ALT 637 W/ HCPCS: Performed by: INTERNAL MEDICINE

## 2022-10-03 PROCEDURE — 25000003 PHARM REV CODE 250: Performed by: INTERNAL MEDICINE

## 2022-10-03 PROCEDURE — 21400001 HC TELEMETRY ROOM

## 2022-10-03 RX ORDER — ZOLPIDEM TARTRATE 5 MG/1
5 TABLET ORAL NIGHTLY PRN
Status: DISCONTINUED | OUTPATIENT
Start: 2022-10-03 | End: 2022-10-04 | Stop reason: HOSPADM

## 2022-10-03 RX ADMIN — ZOLPIDEM TARTRATE 5 MG: 5 TABLET ORAL at 08:10

## 2022-10-03 RX ADMIN — LISINOPRIL 20 MG: 20 TABLET ORAL at 08:10

## 2022-10-03 RX ADMIN — NINTEDANIB 150 MG: 150 CAPSULE ORAL at 08:10

## 2022-10-03 RX ADMIN — FUROSEMIDE 20 MG: 20 TABLET ORAL at 08:10

## 2022-10-03 RX ADMIN — TRAMADOL HYDROCHLORIDE 50 MG: 50 TABLET, COATED ORAL at 08:10

## 2022-10-03 RX ADMIN — DIPHENOXYLATE HYDROCHLORIDE AND ATROPINE SULFATE 2 TABLET: 2.5; .025 TABLET ORAL at 08:10

## 2022-10-03 RX ADMIN — HYDROCHLOROTHIAZIDE 25 MG: 25 TABLET ORAL at 08:10

## 2022-10-03 RX ADMIN — BUDESONIDE 9 MG: 3 CAPSULE, COATED PELLETS ORAL at 08:10

## 2022-10-03 RX ADMIN — CIPROFLOXACIN 500 MG: 500 TABLET, FILM COATED ORAL at 08:10

## 2022-10-03 RX ADMIN — FLUTICASONE FUROATE AND VILANTEROL TRIFENATATE 1 PUFF: 100; 25 POWDER RESPIRATORY (INHALATION) at 08:10

## 2022-10-03 RX ADMIN — ASPIRIN 81 MG: 81 TABLET, COATED ORAL at 08:10

## 2022-10-03 RX ADMIN — PANTOPRAZOLE SODIUM 40 MG: 40 TABLET, DELAYED RELEASE ORAL at 08:10

## 2022-10-03 NOTE — PROGRESS NOTES
"Inpatient Nutrition Evaluation    Admit Date: 9/27/2022   Total duration of encounter: 6 days    Nutrition Recommendation/Prescription     Continue low fiber/residue diet as medically feasible  RD to monitor po intake and weight changes    Nutrition Assessment     Chart Review    Reason Seen: length of stay    Diagnosis: Crohn's Colitis  UTI       Relevant Medical History: DM    Nutrition-Related Medications: electrolyte infusion, lasix daily, HCTZ daily, Zofran PRN, Protonix daily,     Nutrition-Related Labs: no new labs      Diet Order: Diet low fiber/residue  Oral Supplement Order: none at this time  Appetite/Oral Intake: good/% of meals  Factors Affecting Nutritional Intake: constipation  Food/Religion/Cultural Preferences: none reported    Skin Integrity: intact  Wound(s):       Comments    10/3: pt reports good appetite, with constipation, having no BM since Friday 9/30. Unable to obtain weight history at this time, pt not willing to cooperate. Latest weight of 111.1 kg (245 lb) on 9/30 and previous weight of 104.3 kg (230 lb) on 9/12. Will continue to monitor.     Anthropometrics    Height: 6' 2" (188 cm) Height Method: Stated  Last Weight: 111.1 kg (245 lb) (09/30/22 1039) Weight Method: Stated  BMI (Calculated): 31.4  BMI Classification: obese grade I (BMI 30-34.9)        Ideal Body Weight (IBW), Male: 190 lb     % Ideal Body Weight, Male (lb): 128.95 %                          Usual Weight Provided By: unable to obtain usual weight at this time    Wt Readings from Last 5 Encounters:   09/30/22 111.1 kg (245 lb)   09/12/22 104.3 kg (230 lb)   01/25/22 130 kg (286 lb 9.6 oz)     Weight Change(s) Since Admission:  Admit Weight: 104.3 kg (229 lb 15 oz) (09/28/22 1737)      Patient Education    Not applicable.    Monitoring & Evaluation     Dietitian will monitor food and beverage intake and weight change.  Nutrition Risk/Follow-Up: low (follow-up in 5-7 days)  Patients assigned 'low nutrition risk' " status do not qualify for a full nutritional assessment but will be monitored and re-evaluated in a 5-7 day time period.    Rach Finley, Registration Eligible, Provisional LDN

## 2022-10-03 NOTE — PROGRESS NOTES
[facility]                       [address] [address]    PATIENT NAME:       ЮЛИЯ NUNEZ   YOB: 1960  CSN:                [id number]   MRN:                [id number]  ADMIT DATE:         09/27/2022 17:31:00  PHYSICIAN:          Sarabjit Arreaga MD                            PROGRESS NOTE    DATE:  10/03/2022 00:00:00    SUBJECTIVE:  The patient is a 62-year-old  male who came with   abdominal pain and diarrhea, eventually got GI involved.  The patient had a   colonoscopy done, and biopsy was taken also.  The patient has colitis, and they   think the patient also has Crohn disease.  The patient has been started on   medication.  Apparently, the patient is complaining of multiple things.  He   stated he does not sleep at night.  He do not to go home now.  Also, the pain   medications do not do anything for him.  Multiple complaints.  But the patient   appears to be fairly stable.    EXAMINATION:  HEART:  The patient has S1 and S2, with no murmur or gallop.  CHEST:  Appears to be fairly clear, with no wheezing or rales.  ABDOMEN:  Nontender.  EXTREMITIES:  Superior and inferior, fairly good range of motion.    IMPRESSION:  The patient has:  1. Colitis.    2. Urinary tract infection.  3. Hypertension.  4. Diabetes mellitus type 2.    PLAN:    1. Will continue present care.  2. Offer some medication for the patient to be started at night; will give him   some Ambien.        ______________________________  Sarabjit Arreaga MD    CHL/AQS  DD:  10/03/2022  Time:  02:30PM  DT:  10/03/2022  Time:  02:51PM  Job #:  [id number]      PROGRESS NOTE

## 2022-10-03 NOTE — PROGRESS NOTES
Progress Note         Hospital follow up  CC: diarrhea/abdominal pain    Subjective:     Resting comfortably in bed. No further diarrhea or defecation since colonoscopy on Friday. No further abdominal pain. No nausea/vomiting. No GI complaints. Tolerating diet. Pathology still pending.    Review of Systems:     Review of Systems   Constitutional:  Negative for chills and fever.   Respiratory:  Negative for choking and shortness of breath.    Gastrointestinal:  Negative for abdominal distention, abdominal pain, anal bleeding, blood in stool, constipation, diarrhea, nausea, rectal pain and vomiting.     Objective:   Scheduled Meds:   aspirin  81 mg Oral Daily    budesonide  9 mg Oral Daily    ciprofloxacin HCl  500 mg Oral Daily    diphenoxylate-atropine 2.5-0.025 mg  2 tablet Oral TID    fluticasone furoate-vilanteroL  1 puff Inhalation Daily    furosemide  20 mg Oral Daily    lisinopriL  20 mg Oral Daily    And    hydroCHLOROthiazide  25 mg Oral Daily    LIDOcaine (PF) 10 mg/ml (1%)  1 mL Intradermal Once    nintedanib  150 mg Oral BID    pantoprazole  40 mg Oral Daily    traMADoL  50 mg Oral Q12H     Continuous Infusions:   electrolyte-A       PRN Meds:  acetaminophen, dicyclomine, HYDROcodone-acetaminophen, melatonin, midazolam, morphine, morphine, ondansetron, prochlorperazine      VITAL SIGNS: 24 HR MIN & MAX LAST    Temp  Min: 97.7 °F (36.5 °C)  Max: 99 °F (37.2 °C)  97.7 °F (36.5 °C)        BP  Min: 93/60  Max: 106/70  100/68     Pulse  Min: 68  Max: 96  86     Resp  Min: 18  Max: 20  18   SpO2  Min: 97 %  Max: 98 %  97 %        Intake/Output Summary (Last 24 hours) at 10/3/2022 1216  Last data filed at 10/2/2022 2100  Gross per 24 hour   Intake 480 ml   Output 600 ml   Net -120 ml     Physical Exam  Vitals and nursing note reviewed.   Constitutional:       General: He is not in acute distress.     Appearance: He is obese. He is not ill-appearing.   HENT:      Head: Normocephalic and atraumatic.      Nose:  Nose normal.      Mouth/Throat:      Mouth: Mucous membranes are moist.   Eyes:      General: No scleral icterus.     Extraocular Movements: Extraocular movements intact.      Conjunctiva/sclera: Conjunctivae normal.   Cardiovascular:      Rate and Rhythm: Normal rate and regular rhythm.      Heart sounds: Normal heart sounds.   Pulmonary:      Effort: Pulmonary effort is normal. No respiratory distress.      Breath sounds: Normal breath sounds.   Abdominal:      General: Abdomen is flat. Bowel sounds are normal. There is no distension.      Palpations: Abdomen is soft. There is no mass.      Tenderness: There is no abdominal tenderness. There is no guarding or rebound.      Hernia: No hernia is present.   Musculoskeletal:         General: Normal range of motion.      Right lower leg: No edema.      Left lower leg: No edema.   Skin:     General: Skin is warm and dry.   Neurological:      General: No focal deficit present.      Mental Status: He is alert and oriented to person, place, and time.   Psychiatric:         Mood and Affect: Mood normal.         Behavior: Behavior normal.         Thought Content: Thought content normal.      No results found for this or any previous visit (from the past 24 hour(s)).    Assessment / Plan:   62-year-old male with history a.m..  Presents with several complaints including lower abdominal pain, headache, weakness, decreased appetite, decreased p.o. intake times several months.  Symptoms have wax and waned over the last few months.  No GI bleeding noted.  CT negative.  Dehydrated on labs.    Colonoscopy 9/30/22: internal hemorrhoids, mild erythema in the DC with few scattered shallow ulcerations s/p biopsy, moderate erythema/ulcerations in the TC, severe ulceration/erythema in the AC, TI not intubated     Colitis - infectious vs inflammatory  Lower abdominal pain - resolved  Diarrhea - resolved  Weight loss  Decreased PO intake - resolved  GI PCR negative  Hepatitis negative   CRP  74.90  - Continue routine medical management  - Soft/low residue diet as tolerated  - On Budesonide  - On Cipro  - On Lomotil  - Follow-up quantiferon gold  - Follow-up pathology      Myra Bradley PA-C  [facility]  [phone]

## 2022-10-04 ENCOUNTER — NURSE TRIAGE (OUTPATIENT)
Dept: ADMINISTRATIVE | Facility: CLINIC | Age: 62
End: 2022-10-04
Payer: MEDICAID

## 2022-10-04 VITALS
OXYGEN SATURATION: 98 % | HEART RATE: 89 BPM | SYSTOLIC BLOOD PRESSURE: 101 MMHG | DIASTOLIC BLOOD PRESSURE: 68 MMHG | BODY MASS INDEX: 31.44 KG/M2 | HEIGHT: 74 IN | WEIGHT: 245 LBS | TEMPERATURE: 97 F | RESPIRATION RATE: 18 BRPM

## 2022-10-04 PROBLEM — N39.0 UTI (URINARY TRACT INFECTION): Status: RESOLVED | Noted: 2022-10-02 | Resolved: 2022-10-04

## 2022-10-04 LAB
ALBUMIN SERPL-MCNC: 2.5 GM/DL (ref 3.4–4.8)
ALBUMIN/GLOB SERPL: 0.8 RATIO (ref 1.1–2)
ALP SERPL-CCNC: 45 UNIT/L (ref 40–150)
ALT SERPL-CCNC: 8 UNIT/L (ref 0–55)
AST SERPL-CCNC: 9 UNIT/L (ref 5–34)
BASOPHILS # BLD AUTO: 0.08 X10(3)/MCL (ref 0–0.2)
BASOPHILS NFR BLD AUTO: 0.7 %
BILIRUBIN DIRECT+TOT PNL SERPL-MCNC: 0.3 MG/DL
BUN SERPL-MCNC: 14.2 MG/DL (ref 8.4–25.7)
CALCIUM SERPL-MCNC: 8.6 MG/DL (ref 8.8–10)
CHLORIDE SERPL-SCNC: 104 MMOL/L (ref 98–107)
CO2 SERPL-SCNC: 25 MMOL/L (ref 23–31)
CREAT SERPL-MCNC: 0.8 MG/DL (ref 0.73–1.18)
EOSINOPHIL # BLD AUTO: 0.22 X10(3)/MCL (ref 0–0.9)
EOSINOPHIL NFR BLD AUTO: 1.8 %
ERYTHROCYTE [DISTWIDTH] IN BLOOD BY AUTOMATED COUNT: 14.5 % (ref 11.5–17)
GAMMA INTERFERON BACKGROUND BLD IA-ACNC: 0 IU/ML
GFR SERPLBLD CREATININE-BSD FMLA CKD-EPI: >60 MLS/MIN/1.73/M2
GLOBULIN SER-MCNC: 3.3 GM/DL (ref 2.4–3.5)
GLUCOSE SERPL-MCNC: 95 MG/DL (ref 82–115)
HCT VFR BLD AUTO: 26.7 % (ref 42–52)
HGB BLD-MCNC: 8.8 GM/DL (ref 14–18)
IMM GRANULOCYTES # BLD AUTO: 0.08 X10(3)/MCL (ref 0–0.04)
IMM GRANULOCYTES NFR BLD AUTO: 0.7 %
LYMPHOCYTES # BLD AUTO: 1.48 X10(3)/MCL (ref 0.6–4.6)
LYMPHOCYTES NFR BLD AUTO: 12 %
M TB IFN-G BLD-IMP: NEGATIVE
M TB IFN-G CD4+ BCKGRND COR BLD-ACNC: 0 IU/ML
M TB IFN-G CD4+CD8+ BCKGRND COR BLD-ACNC: 0 IU/ML
MCH RBC QN AUTO: 29.9 PG (ref 27–31)
MCHC RBC AUTO-ENTMCNC: 33 MG/DL (ref 33–36)
MCV RBC AUTO: 90.8 FL (ref 80–94)
MITOGEN IGNF BCKGRD COR BLD-ACNC: 1.95 IU/ML
MONOCYTES # BLD AUTO: 1.13 X10(3)/MCL (ref 0.1–1.3)
MONOCYTES NFR BLD AUTO: 9.2 %
NEUTROPHILS # BLD AUTO: 9.3 X10(3)/MCL (ref 2.1–9.2)
NEUTROPHILS NFR BLD AUTO: 75.6 %
NRBC BLD AUTO-RTO: 0 %
PATH REV: NORMAL
PLATELET # BLD AUTO: 377 X10(3)/MCL (ref 130–400)
PMV BLD AUTO: 10.2 FL (ref 7.4–10.4)
POTASSIUM SERPL-SCNC: 4.2 MMOL/L (ref 3.5–5.1)
PROT SERPL-MCNC: 5.8 GM/DL (ref 5.8–7.6)
RBC # BLD AUTO: 2.94 X10(6)/MCL (ref 4.7–6.1)
SODIUM SERPL-SCNC: 135 MMOL/L (ref 136–145)
WBC # SPEC AUTO: 12.3 X10(3)/MCL (ref 4.5–11.5)

## 2022-10-04 PROCEDURE — 85025 COMPLETE CBC W/AUTO DIFF WBC: CPT | Performed by: INTERNAL MEDICINE

## 2022-10-04 PROCEDURE — 25000003 PHARM REV CODE 250: Performed by: INTERNAL MEDICINE

## 2022-10-04 PROCEDURE — 80053 COMPREHEN METABOLIC PANEL: CPT | Performed by: INTERNAL MEDICINE

## 2022-10-04 PROCEDURE — 25000003 PHARM REV CODE 250: Performed by: EMERGENCY MEDICINE

## 2022-10-04 PROCEDURE — 25000242 PHARM REV CODE 250 ALT 637 W/ HCPCS: Performed by: INTERNAL MEDICINE

## 2022-10-04 PROCEDURE — 36415 COLL VENOUS BLD VENIPUNCTURE: CPT | Performed by: INTERNAL MEDICINE

## 2022-10-04 RX ORDER — CIPROFLOXACIN 500 MG/1
500 TABLET ORAL EVERY 12 HOURS
Status: DISCONTINUED | OUTPATIENT
Start: 2022-10-04 | End: 2022-10-04 | Stop reason: HOSPADM

## 2022-10-04 RX ADMIN — TRAMADOL HYDROCHLORIDE 50 MG: 50 TABLET, COATED ORAL at 09:10

## 2022-10-04 RX ADMIN — LISINOPRIL 20 MG: 20 TABLET ORAL at 09:10

## 2022-10-04 RX ADMIN — BUDESONIDE 9 MG: 3 CAPSULE, COATED PELLETS ORAL at 09:10

## 2022-10-04 RX ADMIN — HYDROCHLOROTHIAZIDE 25 MG: 25 TABLET ORAL at 09:10

## 2022-10-04 RX ADMIN — DIPHENOXYLATE HYDROCHLORIDE AND ATROPINE SULFATE 2 TABLET: 2.5; .025 TABLET ORAL at 09:10

## 2022-10-04 RX ADMIN — PANTOPRAZOLE SODIUM 40 MG: 40 TABLET, DELAYED RELEASE ORAL at 09:10

## 2022-10-04 RX ADMIN — NINTEDANIB 150 MG: 150 CAPSULE ORAL at 09:10

## 2022-10-04 RX ADMIN — FUROSEMIDE 20 MG: 20 TABLET ORAL at 11:10

## 2022-10-04 RX ADMIN — ASPIRIN 81 MG: 81 TABLET, COATED ORAL at 09:10

## 2022-10-04 RX ADMIN — ACETAMINOPHEN 650 MG: 325 TABLET ORAL at 11:10

## 2022-10-04 RX ADMIN — FLUTICASONE FUROATE AND VILANTEROL TRIFENATATE 1 PUFF: 100; 25 POWDER RESPIRATORY (INHALATION) at 09:10

## 2022-10-04 RX ADMIN — CIPROFLOXACIN 500 MG: 500 TABLET, FILM COATED ORAL at 09:10

## 2022-10-04 NOTE — DISCHARGE SUMMARY
[facility]                       [address] [address]    PATIENT NAME:       ЮЛИЯ NUNEZ   YOB: 1960  CSN:                [id number]   MRN:                [id number]  ADMIT DATE:         09/27/2022 17:31:00  PHYSICIAN:          Sarabjit Arreaga MD                          DISCHARGE SUMMARY    DATE OF DISCHARGE:  10/04/2022 00:00:00    FINAL DIAGNOSES:    1. Abdominal pain.    2. Colitis.    3. Crohn disease.  4. Hypertension.    5. Diabetes mellitus type 2.    HOSPITAL COURSE:  The patient is a 62-year-old  male who came   with history of diarrhea, abdominal pain.  Therefore, we consulted GI, who did a   colonoscopy on the patient and discovered that the patient has colitis and   definitely has Crohn disease.  Therefore, the patient has been on medication.    We are giving him some pain medication.  Apparently, they found out that he has   some degree of colitis also, but generally, the patient is fairly stable now.  He is   eating and complaining that the food is not good, but the patient is stable   enough to go home.  Therefore, I am going to let him go.  I explained to him to   continue his medication because he was stating that one of the nurses stated   that the medication he is taking for Crohn disease can give him more problems (I   do not believe), but I encouraged him to keep taking his medication.  I will   see him as an outpatient in the next 2 weeks.        ______________________________  Sarabjit Arreaga MD    CHL/AQS  DD:  10/04/2022  Time:  12:48PM  DT:  10/04/2022  Time:  01:02PM  Job #:  [id number]      DISCHARGE SUMMARY

## 2022-10-04 NOTE — PROGRESS NOTES
Progress Note         Hospital follow up  CC: diarrhea/abdominal pain    Subjective:     Resting comfortably in bed. No further diarrhea or defecation since colonoscopy on Friday. No further abdominal pain. No nausea/vomiting. No GI complaints. Tolerating diet. Pathology consistent with acute colitis; infectious vs inflammatory.    Review of Systems:     Review of Systems   Constitutional:  Negative for chills and fever.   Respiratory:  Negative for choking and shortness of breath.    Gastrointestinal:  Negative for abdominal distention, abdominal pain, anal bleeding, blood in stool, constipation, diarrhea, nausea, rectal pain and vomiting.     Objective:   Scheduled Meds:   aspirin  81 mg Oral Daily    budesonide  9 mg Oral Daily    ciprofloxacin HCl  500 mg Oral Daily    diphenoxylate-atropine 2.5-0.025 mg  2 tablet Oral TID    fluticasone furoate-vilanteroL  1 puff Inhalation Daily    furosemide  20 mg Oral Daily    lisinopriL  20 mg Oral Daily    And    hydroCHLOROthiazide  25 mg Oral Daily    LIDOcaine (PF) 10 mg/ml (1%)  1 mL Intradermal Once    nintedanib  150 mg Oral BID    pantoprazole  40 mg Oral Daily    traMADoL  50 mg Oral Q12H     Continuous Infusions:   electrolyte-A       PRN Meds:  acetaminophen, dicyclomine, HYDROcodone-acetaminophen, melatonin, midazolam, morphine, morphine, ondansetron, prochlorperazine, zolpidem      VITAL SIGNS: 24 HR MIN & MAX LAST    Temp  Min: 97.4 °F (36.3 °C)  Max: 99 °F (37.2 °C)  97.4 °F (36.3 °C)        BP  Min: 101/68  Max: 105/66  101/68     Pulse  Min: 76  Max: 90  89     Resp  Min: 14  Max: 18  18   SpO2  Min: 97 %  Max: 98 %  98 %        Intake/Output Summary (Last 24 hours) at 10/4/2022 1128  Last data filed at 10/3/2022 2100  Gross per 24 hour   Intake 1200 ml   Output 600 ml   Net 600 ml       Physical Exam  Vitals and nursing note reviewed.   Constitutional:       General: He is not in acute distress.     Appearance: He is obese. He is not ill-appearing.    HENT:      Head: Normocephalic and atraumatic.      Nose: Nose normal.      Mouth/Throat:      Mouth: Mucous membranes are moist.   Eyes:      General: No scleral icterus.     Extraocular Movements: Extraocular movements intact.      Conjunctiva/sclera: Conjunctivae normal.   Cardiovascular:      Rate and Rhythm: Normal rate and regular rhythm.      Heart sounds: Normal heart sounds.   Pulmonary:      Effort: Pulmonary effort is normal. No respiratory distress.      Breath sounds: Normal breath sounds.   Abdominal:      General: Abdomen is flat. Bowel sounds are normal. There is no distension.      Palpations: Abdomen is soft. There is no mass.      Tenderness: There is no abdominal tenderness. There is no guarding or rebound.      Hernia: No hernia is present.   Musculoskeletal:         General: Normal range of motion.      Right lower leg: No edema.      Left lower leg: No edema.   Skin:     General: Skin is warm and dry.   Neurological:      General: No focal deficit present.      Mental Status: He is alert and oriented to person, place, and time.   Psychiatric:         Mood and Affect: Mood normal.         Behavior: Behavior normal.         Thought Content: Thought content normal.      Recent Results (from the past 24 hour(s))   Comprehensive Metabolic Panel    Collection Time: 10/04/22  9:14 AM   Result Value Ref Range    Sodium Level 135 (L) 136 - 145 mmol/L    Potassium Level 4.2 3.5 - 5.1 mmol/L    Chloride 104 98 - 107 mmol/L    Carbon Dioxide 25 23 - 31 mmol/L    Glucose Level 95 82 - 115 mg/dL    Blood Urea Nitrogen 14.2 8.4 - 25.7 mg/dL    Creatinine 0.80 0.73 - 1.18 mg/dL    Calcium Level Total 8.6 (L) 8.8 - 10.0 mg/dL    Protein Total 5.8 5.8 - 7.6 gm/dL    Albumin Level 2.5 (L) 3.4 - 4.8 gm/dL    Globulin 3.3 2.4 - 3.5 gm/dL    Albumin/Globulin Ratio 0.8 (L) 1.1 - 2.0 ratio    Bilirubin Total 0.3 <=1.5 mg/dL    Alkaline Phosphatase 45 40 - 150 unit/L    Alanine Aminotransferase 8 0 - 55 unit/L     Aspartate Aminotransferase 9 5 - 34 unit/L    eGFR >60 mls/min/1.73/m2   CBC with Differential    Collection Time: 10/04/22  9:14 AM   Result Value Ref Range    WBC 12.3 (H) 4.5 - 11.5 x10(3)/mcL    RBC 2.94 (L) 4.70 - 6.10 x10(6)/mcL    Hgb 8.8 (L) 14.0 - 18.0 gm/dL    Hct 26.7 (L) 42.0 - 52.0 %    MCV 90.8 80.0 - 94.0 fL    MCH 29.9 27.0 - 31.0 pg    MCHC 33.0 33.0 - 36.0 mg/dL    RDW 14.5 11.5 - 17.0 %    Platelet 377 130 - 400 x10(3)/mcL    MPV 10.2 7.4 - 10.4 fL    Neut % 75.6 %    Lymph % 12.0 %    Mono % 9.2 %    Eos % 1.8 %    Basophil % 0.7 %    Lymph # 1.48 0.6 - 4.6 x10(3)/mcL    Neut # 9.3 (H) 2.1 - 9.2 x10(3)/mcL    Mono # 1.13 0.1 - 1.3 x10(3)/mcL    Eos # 0.22 0 - 0.9 x10(3)/mcL    Baso # 0.08 0 - 0.2 x10(3)/mcL    IG# 0.08 (H) 0 - 0.04 x10(3)/mcL    IG% 0.7 %    NRBC% 0.0 %       Assessment / Plan:   62-year-old male with history a.m..  Presents with several complaints including lower abdominal pain, headache, weakness, decreased appetite, decreased p.o. intake times several months.  Symptoms have wax and waned over the last few months.  No GI bleeding noted.  CT negative.  Dehydrated on labs.    Colonoscopy 9/30/22: internal hemorrhoids, mild erythema in the DC with few scattered shallow ulcerations s/p biopsy, moderate erythema/ulcerations in the TC, severe ulceration/erythema in the AC, TI not intubated  Pathology: transverse/ascending colon - FRAGMENTS OF COLONIC MUCOSA WITH ACTIVE ULCER AND REACTIVE EPITHELIAL CHANGES; descending colon - SEVERE ACUTE COLITIS WITH REACTIVE EPITHELIAL CHANGES. All three samplings show similar morphologic changes including severe acute inflammation with active ulceration.  There is no evidence of microgranulomata.  Some of the samplings reveal mild crypt architectural disarray.  The differential diagnosis includes infectious etiology vs inflammatory bowel disease.  Clinical correlation is recommended.     Colitis - infectious vs inflammatory  Lower abdominal pain -  resolved  Diarrhea - resolved  Weight loss  Decreased PO intake - resolved  GI PCR negative  Hepatitis panel negative   CRP 74.90  - Continue routine medical management  - Soft/low residue diet as tolerated  - Continue Budesonide 9mg daily x 4 weeks  - Continue Cipro/Flagyl x 10 days  - Antidiarrheals as needed  - Follow-up quantiferon gold  - Given the patient's insurance, he requires outpatient follow-up with [facility] or Dr. Ibarra in [place]; recommend follow-up within 4 weeks for further investigation of IBD with consideration for [employer] IBD panel.    Doing well. All GI symptoms have resolved. Okay for discharge from GI standpoint with the above recommendations. Please call GI if needed further.      Myra Bradley PA-C  [facility]  [phone]

## 2022-10-04 NOTE — TELEPHONE ENCOUNTER
Reason for Disposition   Nursing judgment    Protocols used: Information Only Call - No Triage-A-OH    Pt's caregiver Denise stated the Pt discharged from [facility] without prescriptions.    Reviewed pt's AVS and warm transferred the call to Paulina at [facility] nurses station.    Advised to discuss with Charge nurse.

## 2022-10-05 NOTE — ANESTHESIA POSTPROCEDURE EVALUATION
Anesthesia Post Evaluation    Patient: Vic Meng    Procedure(s) Performed: Procedure(s) (LRB):  COLON (N/A)    Final Anesthesia Type: general      Patient location during evaluation: PACU  Patient participation: Yes- Able to Participate  Level of consciousness: awake and alert  Post-procedure vital signs: reviewed and stable  Pain management: adequate  Airway patency: patent      Anesthetic complications: no      Cardiovascular status: blood pressure returned to baseline  Respiratory status: unassisted  Hydration status: euvolemic  Follow-up not needed.          Vitals Value Taken Time   BP 96/70 09/30/22 1230   Temp ** 10/05/22 1003   Pulse 96 09/30/22 1238   Resp 21 09/30/22 1238   SpO2 96 % 09/30/22 1200   Vitals shown include unvalidated device data.      No case tracking events are documented in the log.      Pain/Ana Score: Pain Rating Prior to Med Admin: 7 (10/4/2022 11:28 AM)

## 2022-10-07 NOTE — CLINICAL REVIEW
62-year-old male who presented with abdominal pain, diarrhea.  GI were consulted.  The patient had a colonoscopy which was consistent with inflammatory bowel disease.  Partial denial.  On the requested dates for denial, the patient was hemodynamically stable, afebrile, tolerating oral food, tolerating medications, without any active exacerbation of chronic illnesses, the continued stay was not medically necessary    MD LICO  , Physician Advisor

## 2022-12-05 NOTE — TELEPHONE ENCOUNTER
Received fax request for Ofev refill  Pt doesn't have Rheum appt scheduled and hasn't been seen since Dr. Dumont left   Called and spoke to pt  He stated he wants to come back to [facility] and needs to see Rheum  We discussed what's been happening w/him medically, states he's doing better after losing a lot of weight  Pt said his PCP ordered refill of Ofev  Advised pt I'll ask front office to call and schedule an appt

## 2022-12-27 RX ORDER — MEGESTROL ACETATE 40 MG/ML
SUSPENSION ORAL
COMMUNITY
Start: 2022-11-17 | End: 2023-01-04 | Stop reason: ALTCHOICE

## 2022-12-27 RX ORDER — FLUTICASONE PROPIONATE 50 MCG
1 SPRAY, SUSPENSION (ML) NASAL 2 TIMES DAILY
COMMUNITY
Start: 2022-10-13

## 2022-12-27 RX ORDER — BENZONATATE 100 MG/1
100 CAPSULE ORAL 2 TIMES DAILY
COMMUNITY
Start: 2022-10-10 | End: 2023-01-04 | Stop reason: ALTCHOICE

## 2022-12-27 RX ORDER — AZELASTINE 1 MG/ML
1 SPRAY, METERED NASAL 2 TIMES DAILY
COMMUNITY
Start: 2022-10-13 | End: 2023-01-04 | Stop reason: ALTCHOICE

## 2022-12-27 RX ORDER — FLUTICASONE PROPIONATE AND SALMETEROL XINAFOATE 45; 21 UG/1; UG/1
AEROSOL, METERED RESPIRATORY (INHALATION)
COMMUNITY
Start: 2021-12-22 | End: 2023-01-04 | Stop reason: ALTCHOICE

## 2022-12-27 RX ORDER — LIFITEGRAST 50 MG/ML
1 SOLUTION/ DROPS OPHTHALMIC 2 TIMES DAILY
COMMUNITY
Start: 2022-11-21

## 2022-12-27 RX ORDER — BLOOD-GLUCOSE METER
EACH MISCELLANEOUS
COMMUNITY
Start: 2022-09-13

## 2022-12-27 RX ORDER — GABAPENTIN 300 MG/1
300 CAPSULE ORAL
COMMUNITY
Start: 2021-11-10 | End: 2023-01-04 | Stop reason: ALTCHOICE

## 2022-12-27 RX ORDER — LOTEPREDNOL ETABONATE 2 MG/ML
SUSPENSION/ DROPS OPHTHALMIC
COMMUNITY
Start: 2022-10-20 | End: 2023-01-04 | Stop reason: ALTCHOICE

## 2022-12-27 RX ORDER — NINTEDANIB 150 MG/1
1 CAPSULE ORAL EVERY 12 HOURS
COMMUNITY
Start: 2022-09-21 | End: 2023-01-18 | Stop reason: SINTOL

## 2022-12-27 RX ORDER — SIMETHICONE 80 MG
80 TABLET,CHEWABLE ORAL
COMMUNITY
Start: 2022-03-08 | End: 2023-01-04 | Stop reason: ALTCHOICE

## 2022-12-27 RX ORDER — DICLOFENAC SODIUM 75 MG/1
75 TABLET, DELAYED RELEASE ORAL DAILY PRN
COMMUNITY
Start: 2022-11-28

## 2022-12-27 RX ORDER — AZITHROMYCIN 250 MG/1
TABLET, FILM COATED ORAL
COMMUNITY
Start: 2022-10-10 | End: 2023-01-04 | Stop reason: ALTCHOICE

## 2022-12-27 RX ORDER — ZOLPIDEM TARTRATE 5 MG/1
5 TABLET ORAL NIGHTLY
COMMUNITY
Start: 2022-10-13 | End: 2023-01-04 | Stop reason: ALTCHOICE

## 2022-12-27 RX ORDER — SUMATRIPTAN SUCCINATE 25 MG/1
25 TABLET ORAL EVERY 12 HOURS PRN
COMMUNITY
Start: 2022-10-13 | End: 2023-01-04 | Stop reason: ALTCHOICE

## 2022-12-27 RX ORDER — SODIUM PICOSULFATE, MAGNESIUM OXIDE, AND ANHYDROUS CITRIC ACID 10; 3.5; 12 MG/160ML; G/160ML; G/160ML
LIQUID ORAL
COMMUNITY
Start: 2022-08-09 | End: 2023-01-04 | Stop reason: ALTCHOICE

## 2022-12-27 RX ORDER — ISOPROPYL ALCOHOL 0.75 G/1
SWAB TOPICAL
COMMUNITY
Start: 2022-11-28

## 2023-01-03 PROBLEM — I10 HYPERTENSION: Status: ACTIVE | Noted: 2023-01-03

## 2023-01-03 PROBLEM — B02.9 HERPES ZOSTER: Status: ACTIVE | Noted: 2023-01-03

## 2023-01-03 PROBLEM — M17.11 OSTEOARTHRITIS OF RIGHT KNEE: Status: ACTIVE | Noted: 2023-01-03

## 2023-01-03 PROBLEM — E66.01 MORBID OBESITY: Status: ACTIVE | Noted: 2023-01-03

## 2023-01-03 PROBLEM — G47.26 CIRCADIAN RHYTHM SLEEP DISORDER, SHIFT WORK TYPE: Status: ACTIVE | Noted: 2023-01-03

## 2023-01-03 PROBLEM — D89.89 ANTISYNTHETASE SYNDROME: Status: ACTIVE | Noted: 2023-01-03

## 2023-01-03 PROBLEM — R59.9 ADENOPATHY: Status: ACTIVE | Noted: 2023-01-03

## 2023-01-03 PROBLEM — K12.1 MOUTH ULCERATION: Status: ACTIVE | Noted: 2023-01-03

## 2023-01-03 PROBLEM — J84.9 INTERSTITIAL LUNG DISEASE: Status: ACTIVE | Noted: 2023-01-03

## 2023-01-03 PROBLEM — E04.1 THYROID NODULE: Status: ACTIVE | Noted: 2023-01-03

## 2023-01-03 PROBLEM — E11.9 TYPE 2 DIABETES MELLITUS: Status: ACTIVE | Noted: 2023-01-03

## 2023-01-03 PROBLEM — K21.9 GASTROESOPHAGEAL REFLUX DISEASE: Status: ACTIVE | Noted: 2023-01-03

## 2023-01-03 PROBLEM — R76.8 POSITIVE ANTINUCLEAR ANTIBODY: Status: ACTIVE | Noted: 2023-01-03

## 2023-01-03 PROBLEM — I87.2 VENOUS INSUFFICIENCY OF LOWER EXTREMITY: Status: ACTIVE | Noted: 2023-01-03

## 2023-01-03 PROBLEM — M06.9 RHEUMATOID ARTHRITIS: Status: ACTIVE | Noted: 2023-01-03

## 2023-01-04 ENCOUNTER — OFFICE VISIT (OUTPATIENT)
Dept: RHEUMATOLOGY | Facility: CLINIC | Age: 63
End: 2023-01-04
Payer: MEDICAID

## 2023-01-04 VITALS
HEIGHT: 74 IN | RESPIRATION RATE: 16 BRPM | TEMPERATURE: 99 F | WEIGHT: 222.25 LBS | HEART RATE: 87 BPM | DIASTOLIC BLOOD PRESSURE: 71 MMHG | SYSTOLIC BLOOD PRESSURE: 106 MMHG | BODY MASS INDEX: 28.52 KG/M2 | OXYGEN SATURATION: 96 %

## 2023-01-04 DIAGNOSIS — E11.9 TYPE 2 DIABETES MELLITUS WITHOUT COMPLICATION, WITH LONG-TERM CURRENT USE OF INSULIN: ICD-10-CM

## 2023-01-04 DIAGNOSIS — D49.7 FOLLICULAR NEOPLASM OF THYROID: ICD-10-CM

## 2023-01-04 DIAGNOSIS — M17.11 OSTEOARTHRITIS OF RIGHT KNEE, UNSPECIFIED OSTEOARTHRITIS TYPE: ICD-10-CM

## 2023-01-04 DIAGNOSIS — D89.89 ANTISYNTHETASE SYNDROME: Primary | ICD-10-CM

## 2023-01-04 DIAGNOSIS — M87.00 AVASCULAR NECROSIS OF BONE: ICD-10-CM

## 2023-01-04 DIAGNOSIS — Z79.4 TYPE 2 DIABETES MELLITUS WITHOUT COMPLICATION, WITH LONG-TERM CURRENT USE OF INSULIN: ICD-10-CM

## 2023-01-04 DIAGNOSIS — Z72.0 TOBACCO USE: ICD-10-CM

## 2023-01-04 DIAGNOSIS — I27.20 PULMONARY HTN: ICD-10-CM

## 2023-01-04 DIAGNOSIS — J84.9 INTERSTITIAL LUNG DISEASE: ICD-10-CM

## 2023-01-04 DIAGNOSIS — M05.79 RHEUMATOID ARTHRITIS INVOLVING MULTIPLE SITES WITH POSITIVE RHEUMATOID FACTOR: ICD-10-CM

## 2023-01-04 DIAGNOSIS — K50.119 CROHN'S DISEASE OF COLON WITH COMPLICATION: ICD-10-CM

## 2023-01-04 DIAGNOSIS — I10 HYPERTENSION, UNSPECIFIED TYPE: ICD-10-CM

## 2023-01-04 DIAGNOSIS — Z79.899 HIGH RISK MEDICATION USE: ICD-10-CM

## 2023-01-04 PROCEDURE — 1160F RVW MEDS BY RX/DR IN RCRD: CPT | Mod: CPTII,,, | Performed by: INTERNAL MEDICINE

## 2023-01-04 PROCEDURE — 3078F PR MOST RECENT DIASTOLIC BLOOD PRESSURE < 80 MM HG: ICD-10-PCS | Mod: CPTII,,, | Performed by: INTERNAL MEDICINE

## 2023-01-04 PROCEDURE — 1159F MED LIST DOCD IN RCRD: CPT | Mod: CPTII,,, | Performed by: INTERNAL MEDICINE

## 2023-01-04 PROCEDURE — 3074F PR MOST RECENT SYSTOLIC BLOOD PRESSURE < 130 MM HG: ICD-10-PCS | Mod: CPTII,,, | Performed by: INTERNAL MEDICINE

## 2023-01-04 PROCEDURE — 3074F SYST BP LT 130 MM HG: CPT | Mod: CPTII,,, | Performed by: INTERNAL MEDICINE

## 2023-01-04 PROCEDURE — 3008F BODY MASS INDEX DOCD: CPT | Mod: CPTII,,, | Performed by: INTERNAL MEDICINE

## 2023-01-04 PROCEDURE — 99215 OFFICE O/P EST HI 40 MIN: CPT | Mod: S$PBB,,, | Performed by: INTERNAL MEDICINE

## 2023-01-04 PROCEDURE — 3078F DIAST BP <80 MM HG: CPT | Mod: CPTII,,, | Performed by: INTERNAL MEDICINE

## 2023-01-04 PROCEDURE — 1159F PR MEDICATION LIST DOCUMENTED IN MEDICAL RECORD: ICD-10-PCS | Mod: CPTII,,, | Performed by: INTERNAL MEDICINE

## 2023-01-04 PROCEDURE — 99215 PR OFFICE/OUTPT VISIT, EST, LEVL V, 40-54 MIN: ICD-10-PCS | Mod: S$PBB,,, | Performed by: INTERNAL MEDICINE

## 2023-01-04 PROCEDURE — 99215 OFFICE O/P EST HI 40 MIN: CPT | Mod: PBBFAC | Performed by: INTERNAL MEDICINE

## 2023-01-04 PROCEDURE — 1160F PR REVIEW ALL MEDS BY PRESCRIBER/CLIN PHARMACIST DOCUMENTED: ICD-10-PCS | Mod: CPTII,,, | Performed by: INTERNAL MEDICINE

## 2023-01-04 PROCEDURE — 3008F PR BODY MASS INDEX (BMI) DOCUMENTED: ICD-10-PCS | Mod: CPTII,,, | Performed by: INTERNAL MEDICINE

## 2023-01-04 RX ORDER — LINACLOTIDE 145 UG/1
145 CAPSULE, GELATIN COATED ORAL
COMMUNITY
Start: 2022-12-21 | End: 2023-03-21 | Stop reason: ALTCHOICE

## 2023-01-04 RX ORDER — LACTULOSE 20 G/20G
20 POWDER, FOR SOLUTION ORAL DAILY PRN
COMMUNITY
Start: 2022-12-21

## 2023-01-04 NOTE — PROGRESS NOTES
Patient ID: [id number]     Chief Complaint: follow up (No complaints. Normal RA pain. /States medication is working. /)    HPI:     Vic Meng is a 62 y.o. male here today for follow up of ILD, RA and antisynthetase overlap.    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 He was first seen by rheum in March 2021 and had been on prednisone 60mg/d.  He was started on steroid taper, Ofev and later Actemra. Right thyroid nodule FNA suspicious for follicular neoplasm s/p R thyroid lobectomy on 1/14/22. Path consistent with benign follicular adenoma. After diagnosis his Actemra was discontinued secondary to thyroid nodule and he was started on Rituximab. Received one dose of Rituximab 1000 mg on 3/2/22 and on 3/16/22. No issues with it.     He remains in a wheelchair when he is out of his home and is able to use a cane at home.  He is no longer using his oxygen at home at this time due to costs, states his insurance is not covering but does feel that he could use at times. Previous referral was made to pulmonary however he has not seen anyone, will make new referral.   Currently taking OFEV 150 mg BID.  He follows GI here in [place] for his history of Crohn's at [facility]- he states he needs to have some lab work done before starting treatment for his Crohns- will request records.     Denies history of fevers, rashes, photosensitivity, oral or nasal ulcers, h/o MI, stroke, seizures, h/o PE or DVT, Raynaud's phenomenon, uveitis.   Family history of autoimmune disease: none.   Smoking: half a pack now.  40+ pack years  Social History     Tobacco Use   Smoking Status Every Day    Packs/day: 0.50    Types: Cigarettes   Smokeless Tobacco Never          ----------------------------  ADELA positive  Antisynthetase syndrome  Diabetes mellitus  GERD (gastroesophageal  reflux disease)  Herpes zoster  Hypertension  ILD (interstitial lung disease)  Lymphadenopathy  Morbid obesity  Osteoarthritis of right knee  Rheumatoid arthritis  Snoring  Supraclavicular mass  Thyroid nodule  Venous insufficiency of lower extremity     Past Surgical History:   Procedure Laterality Date    Drainage of right thyroid gland lobe  10/07/2021    Laser surgery left leg Left     Right hand surgery         Review of patient's allergies indicates:  No Known Allergies    Outpatient Medications Marked as Taking for the 1/4/23 encounter (Office Visit) with Emeli Pizano MD   Medication Sig Dispense Refill    ADVAIR HFA 45-21 mcg/actuation HFAA inhaler Inhale 2 puffs into the lungs 2 (two) times daily.      alcohol antiseptic pads (ALCOHOL SWABS TOP)   Alcohol swabs, See Instructions, Alcohol swabs to clean prior to insulin injection 4 times a day E 11.65, # 120 EA, 11 Refill(s), Pharmacy: [facility], 179, cm, Height/Length Dosing, 11/23/21 7:40:00 CST, 138, kg, Weight Dosing, 11/23/21 7...      aspirin (ECOTRIN) 81 MG EC tablet Take 81 mg by mouth once daily.      BD ALCOHOL SWABS PadM Apply topically.      diclofenac (VOLTAREN) 75 MG EC tablet Take 75 mg by mouth daily as needed.      fluticasone propionate (FLONASE) 50 mcg/actuation nasal spray 1 spray by Each Nostril route 2 (two) times daily.      furosemide (LASIX) 20 MG tablet Take 20 mg by mouth daily as needed.      KRISTALOSE 20 gram Pack Take 20 g by mouth daily as needed.      LINZESS 145 mcg Cap capsule Take 145 mcg by mouth.      lisinopriL-hydrochlorothiazide (PRINZIDE,ZESTORETIC) 20-25 mg Tab Take 1 tablet by mouth once daily.      multivit-min/FA/lycopen/lutein (CENTRUM SILVER MEN ORAL) Take by mouth.      OFEV 150 mg Cap Take 1 capsule by mouth every 12 (twelve) hours.      TRUE METRIX GO GLUCOSE METER [facility] AS DIRECTED      WALKER MISC   walker, See Instructions, osteoarthritis of hips problems ambulating, # 1 EA, 0 Refill(s)       "XIIDRA 5 % Dpet Place 1 drop into both eyes 2 (two) times daily.         Social History     Socioeconomic History    Marital status: Single   Tobacco Use    Smoking status: Every Day     Packs/day: 0.50     Types: Cigarettes    Smokeless tobacco: Never   Substance and Sexual Activity    Alcohol use: Not Currently    Drug use: Yes     Types: Marijuana     Comment: Occasionally        History reviewed. No pertinent family history.       There is no immunization history on file for this patient.    Patient Care Team:  Sarabjit Arreaga MD as PCP - General (Internal Medicine)     Subjective:     ROS    Constitutional:  Denies chills. Denies fever. Denies night sweats. Denies weight loss.   Ophthalmology: Denies blurred vision. Admits  dry eyes. Denies eye pain. Denies Itching and redness.   ENT: Denies oral ulcers. Denies epistaxis. Admits dry mouth. Denies swollen glands.   Endocrine: Admits diabetes. Admits thyroid Problems, history of cancer.  Respiratory: Admits cough. Denies shortness of breath. Admits shortness of breath with exertion. Denies hemoptysis.   Cardiovascular: Denies chest pain at rest. Denies chest pain with exertion. Denies palpitations.    Gastrointestinal: Denies abdominal pain. Admits to constipation. Denies diarrhea. Denies nausea. Denies vomiting. Denies hematemesis or hematochezia. Denies heartburn.  Genitourinary: Denies blood in urine.  Musculoskeletal: See HPI for details  Integumentary: Denies rash. Denies photosensitivity.   Peripheral Vascular: Denies Ulcers of hands and/or feet. Denies Cold extremities.   Neurologic: Denies dizziness. Denies headache.  Denies loss of strength. Denies numbness or tingling.   Psychiatric: Denies depression. Denies anxiety. Denies suicidal/homicidal ideations.      Objective:     /71 (BP Location: Right arm, Patient Position: Sitting)   Pulse 87   Temp 98.9 °F (37.2 °C) (Oral)   Resp 16   Ht 6' 2" (1.88 m)   Wt 100.8 kg (222 lb 3.6 oz)   SpO2 " 96%   BMI 28.53 kg/m²     Physical Exam    General Appearance: alert, pleasant, in no acute distress.  Skin: Skin color, texture, turgor normal. No rashes or lesions.  Eyes:  extraocular movement intact (EOMI), pupils equal, round, reactive to light and accommodation, conjunctiva clear.  ENT: No oral or nasal ulcers.  Neck:  Neck supple. No adenopathy.   Lungs: CTA throughout without crackles, rhonchi, or wheezes. Mild clubbing present.   Heart: RRR w/o murmurs.  No edema.   Abdomen: Soft, non-tender, no masses, rebound or guarding.  Neuro: Alert, oriented, CN II-XII GI, sensory and motor innervation intact.  Musculoskeletal: Flexion deformity of right second and fourth PIP, Degenerative changes to bilateral hands. No active synovitis noted. History of synovitis in MCP's, right wrist and left ankle.   Psych: Alert, oriented, normal eye contact.      Labs:   2022- Infection panel (hep B/C/Quantiferon) negative.   3/12/2021: C3/C4 ok, Aldolase ok. CRP 1.10 (elevated). TSH 0.2289 (l), TPO Ab <0.9, Thyroglobulin 46.8 (h), Sed 16 (<15), dsDNA <1:10, RNP Ab IgG 71 (moderately positive). RF IgA 13, RF IgG 10, RF IgM 52.   2021: TPO negative.   11/10/2020: ADELA 1:2560 Speckled.      Lab Results   Component Value Date    WBC 7.5 2023    HGB 10.5 (L) 2023    HCT 31.2 (L) 2023     2023    ALT 14 2023    AST 12 2023    BUN 19.2 2023    CREATININE 0.93 2023     2023    K 3.4 (L) 2023    CO2 23 2023    CRP 8.20 (H) 2023    SEDRATE 84 (H) 2023        Imagin2021 CTA Chest NonCoronary: Suboptimal and limited study without gross evidence of pulmonary embolus. Bilateral progressed pulmonary changes reflective of congestive process without exclusion of associated atypical infection related to Covid 19. Bilateral dependent small amount of pleural effusions  3/31/2021 HR CT Thorax W/o Contrast:  Bilateral subpleural reticular  densities with mild lower lung bronchiectasis and possible early honeycombing. Pattern of changes is probable for UIP.    PFT 9/24/2020: FEV1 1.43, 39%; FVC 1.96, 38%; TLC 3.98, 52%; DLCO 10.9, 33%.    CT ab/pelvis: 9/27/22: No acute abnormality of the abdomen and pelvis. 2. Changes consistent with chronic interstitial fibrosis in the bilateral lung bases.      9/13/2021 Xray Left Hip: Sclerotic changes identified in relation to the femoral head changes of avascular necrosis cannot be completely excluded and therefore other imaging modalities might prove helpful for further assessment. Degenerative changes.   Xray Right Hip: Sclerotic changes identified in relation to the femoral head changes of avascular necrosis cannot be completely excluded and therefore other imaging modalities might prove helpful for further assessment.  Degenerative changes.    Echo from 11/20 shows LVEF 50-55% w/ mild tricuspid regurg and RVSP 63 mmHg.   1/2021: Pulmonary hypertension RVSP 63    10/5/2021: ECHO: LV EF 50%    01/08/2021  XR Esophagram:  Small sliding hiatal hernia with intermittent reflux.     3/8/22 Chest Xray:  Prominent interstitial markings with subsegmental atelectatic changes of the bilateral bases.   10/6/2020 Right Knee Xray: Degenerative changes most pronounced in the medial joint compartment.    Assessment:       ICD-10-CM ICD-9-CM   1. Antisynthetase syndrome  D89.89 279.49   2. Rheumatoid arthritis involving multiple sites with positive rheumatoid factor  M05.79 714.0   3. Interstitial lung disease  J84.9 515   4. High risk medication use  Z79.899 V58.69   5. Crohn's disease of colon with complication  K50.119 555.1   6. Hypertension, unspecified type  I10 401.9   7. Type 2 diabetes mellitus without complication, with long-term current use of insulin  E11.9 250.00    Z79.4 V58.67   8. Osteoarthritis of right knee, unspecified osteoarthritis type  M17.11 715.96   9. Follicular neoplasm of thyroid  D49.7 239.7   10.  Pulmonary HTN  I27.20 416.8   11. Avascular necrosis of bone  M87.00 733.40   12. Tobacco use  Z72.0 305.1        Plan:     1. Antisynthetase syndrome: ADELA 1:2560 cytoplasmic / speckled and +PL-12 on myositis panel w/ adv ILD. Hx of oral ulcers, LAD on imaging, and inflammatory joint pain). Positive RNP, MCTD and myositis overlap. In wheel chair for hip and back pain, avascular necrosis of hips, muscle strength is ok. Normal CK, aldolase.  - Received one dose of Rituximab 1000 mg on 3/2/22 and on 3/16/22.   - Advanced ILD, UIP pattern. Will check CT chest, PFT's and ordered labs. Scheduled for echo on 2/1/23. Will discuss further management based on test results.      2. Rheumatoid arthritis involving multiple sites with positive rheumatoid factor:Strongly + CCP and RF.  Previously on Actemra. No active synovitis on exam. Pain in hips secondary to OA and avascular necrosis.   - He was on Actemra in the past. Received one dose of Rituximab 1000 mg on 3/2/22 and on 3/16/22.   - Will monitor.     3. Interstitial lung disease: Severe ILD secondary to RA, overlap w/ antisynthetase syndrome, MCTD. PFT show severe restriction.   - Initially in March 2021, he had been on prednisone 60mg/d for more than a year in the past, off of steroids now. Actemra d/c 2/2 questionable thyroid malignancy, path came back as benign.   - Not on home O2 due to cost issues. Will send referral to pulm.  - Received one dose of Rituximab 1000 mg on 3/2/22 and on 3/16/22. No issues with it.   - Scheduled for CT chest, PFT's and echo on 2/1/23. Will discuss further management after test results.  - C/w OFEV 150 mg BID.      4. High risk medication use   -Advised to stay up-to-date on age appropriate vaccinations and malignancy screening with PCP.   -Persons with rheumatoid arthritis, lupus, psoriatic arthritis and other autoimmune diseases are at increased risk of cardiovascular disease including heart attack and stroke. -We recommend that all  patients with these conditions have annual health maintenance exams including lipid measurements, blood pressure measurements, and smoking cessation counseling when applicable at their primary care provider's office.  -Continued lab monitoring.      5. Crohn's disease of colon per patient, requested records form [facility]. Was hospitalized for abdominal pain in 9/2022 at [facility], reviewed records, he was seen by Dr Tarango. CT abdo/pelvis normal on 9/27/22. Colonoscopy on 9/30/22 showed mod erythema and scattered ulcerations on transverse colon, severe ulcers in ascending colon, inflammation of cecum, biopsy taken, suspicious for crohn's. Biopsy showed active ulcer, reactive changes, no malignancy, mild crypt architectural disarray, dd include infection vs ILD. Planning to start Stelara.      6. Hypertension, T2DM:   -Followed by PCP. He had seen endo in the past.      7. Osteoarthritis of right knee: Recommend stretches and exercises, tylenol as needed. In WC because of arthritis.     8. Right thyroid nodule FNA suspicious for follicular neoplasm s/p R thyroid lobectomy on 1/14/22. Path consistent with benign follicular adenoma. Had seen ENT and was discharged after the pathology results. No other nodules noted (per ENT notes 2/16/22) in intra-op or on UA. Benign lesion.     9. Pulmonary HTN: RVSP 41 mmHg (down from 63). Mild-to-Mod tricuspid regurg. Previously planned referral to [facility] for pulm HTN eval, but pt reports he can not get there.   - Scheduled for echo on 2/1/23.     10. Avascular necrosis of the Hips. 2/2 to chronic high dose steroid use. Wheelchair bound. Continue follow up with ortho.           Follow up in about 5 weeks (around 2/8/2023). In addition to their scheduled follow up, the patient has also been instructed to follow up on as needed basis.      Total time spent with patient and documentation is more than 90 minutes. All questions were answered to patient's  satisfaction and patient verbalized understanding.

## 2023-01-06 ENCOUNTER — TELEPHONE (OUTPATIENT)
Dept: RHEUMATOLOGY | Facility: CLINIC | Age: 63
End: 2023-01-06
Payer: MEDICAID

## 2023-01-06 ENCOUNTER — APPOINTMENT (OUTPATIENT)
Dept: LAB | Facility: HOSPITAL | Age: 63
End: 2023-01-06
Attending: INTERNAL MEDICINE
Payer: MEDICAID

## 2023-01-06 PROCEDURE — 82570 ASSAY OF URINE CREATININE: CPT

## 2023-01-06 PROCEDURE — 81001 URINALYSIS AUTO W/SCOPE: CPT

## 2023-01-06 NOTE — TELEPHONE ENCOUNTER
Called pt to discuss labs   No answer and no voicemail set up to leave message  Called alternate contact (mother) and got voicemail. Left message stating we have some things to review with pt and to please have him call us at his earliest convenient.

## 2023-01-06 NOTE — TELEPHONE ENCOUNTER
----- Message from Emeli Pizano MD sent at 1/5/2023 11:45 AM CST -----  Please inform patient of results.    1. Potassium slightly low, asked him to increase potassium in the diet. Anemia is better. Also increase intake of iron rich foods.     All other results within acceptable ranges.

## 2023-01-11 ENCOUNTER — TELEPHONE (OUTPATIENT)
Dept: RHEUMATOLOGY | Facility: CLINIC | Age: 63
End: 2023-01-11
Payer: MEDICAID

## 2023-01-11 NOTE — TELEPHONE ENCOUNTER
Called pt a second time, he answered    I let him know dr. Pizano's comments on lab work  He was curious to see what ways he can include more potassium in diet. Bananas and potatoes were recommended as well as the option to look online for more food options.  Patient confirmed understanding and will call with any concerns he may have before next appointment.

## 2023-01-18 ENCOUNTER — TELEPHONE (OUTPATIENT)
Dept: RHEUMATOLOGY | Facility: CLINIC | Age: 63
End: 2023-01-18
Payer: MEDICAID

## 2023-01-18 NOTE — TELEPHONE ENCOUNTER
I attempted to reach out to Mr. Ho- after reaching out to TRACY Ashby with Dr. Tarango's office regarding holding Ofev as this may be contributing to some of his colitis symptoms, they do agree to holding the Ofev at this time and will be in touch with him to start steroids. I reached out x 2 however he does not have voicemail set up and his phone is not ringing, if we can continue to try and reach out to him to advise him to stop Ofev at this time as well to be on the look out for a phone call from GI- he can also reach out to them once we speak with him directly so they can advise him on new medication that they will initiate. We will be in touch with him in regards to follow up after he has completed CT chest, PFTs and ECHO to see if new meds need to be started in place of Ofev. Please let me know once we have reached him to advise the above information. Thanks!

## 2023-01-19 NOTE — TELEPHONE ENCOUNTER
Spoke with pt and he is aware we are stopping Ofev at this time.  He was curious about the pharmacy being made aware.  He verbalized understanding and will be at his next appointment in February.

## 2023-02-01 ENCOUNTER — HOSPITAL ENCOUNTER (OUTPATIENT)
Dept: CARDIOLOGY | Facility: HOSPITAL | Age: 63
Discharge: HOME | End: 2023-02-01
Attending: INTERNAL MEDICINE
Payer: MEDICAID

## 2023-02-01 ENCOUNTER — HOSPITAL ENCOUNTER (OUTPATIENT)
Dept: PULMONOLOGY | Facility: HOSPITAL | Age: 63
Discharge: HOME | End: 2023-02-01
Attending: INTERNAL MEDICINE
Payer: MEDICAID

## 2023-02-01 ENCOUNTER — HOSPITAL ENCOUNTER (OUTPATIENT)
Dept: RADIOLOGY | Facility: HOSPITAL | Age: 63
Discharge: HOME | End: 2023-02-01
Attending: INTERNAL MEDICINE
Payer: MEDICAID

## 2023-02-01 VITALS
HEIGHT: 74 IN | WEIGHT: 222 LBS | DIASTOLIC BLOOD PRESSURE: 79 MMHG | SYSTOLIC BLOOD PRESSURE: 116 MMHG | BODY MASS INDEX: 28.49 KG/M2

## 2023-02-01 DIAGNOSIS — D89.89 ANTISYNTHETASE SYNDROME: ICD-10-CM

## 2023-02-01 DIAGNOSIS — J84.9 INTERSTITIAL LUNG DISEASE: ICD-10-CM

## 2023-02-01 DIAGNOSIS — Z79.899 HIGH RISK MEDICATION USE: ICD-10-CM

## 2023-02-01 DIAGNOSIS — M05.79 RHEUMATOID ARTHRITIS INVOLVING MULTIPLE SITES WITH POSITIVE RHEUMATOID FACTOR: ICD-10-CM

## 2023-02-01 DIAGNOSIS — I27.20 PULMONARY HTN: ICD-10-CM

## 2023-02-01 LAB
AV INDEX (PROSTH): 0.67
AV MEAN GRADIENT: 3 MMHG
AV PEAK GRADIENT: 6 MMHG
AV VALVE AREA: 2.07 CM2
AV VELOCITY RATIO: 0.68
BSA FOR ECHO PROCEDURE: 2.29 M2
CV ECHO LV RWT: 0.43 CM
DLCO SINGLE BREATH LLN: 25.2
DLCO SINGLE BREATH PRE REF: 22.3 %
DLCO SINGLE BREATH REF: 32.13
DLCOC SBVA LLN: 2.99
DLCOC SBVA REF: 4.05
DLCOC SINGLE BREATH LLN: 25.2
DLCOC SINGLE BREATH REF: 32.13
DLCOVA LLN: 2.99
DLCOVA PRE REF: 55 %
DLCOVA PRE: 2.23 ML/(MIN*MMHG*L) (ref 2.99–5.1)
DLCOVA REF: 4.05
DOP CALC AO PEAK VEL: 1.18 M/S
DOP CALC AO VTI: 19.5 CM
DOP CALC LVOT AREA: 3.1 CM2
DOP CALC LVOT DIAMETER: 1.98 CM
DOP CALC LVOT PEAK VEL: 0.8 M/S
DOP CALC LVOT STROKE VOLUME: 40.32 CM3
DOP CALCLVOT PEAK VEL VTI: 13.1 CM
E WAVE DECELERATION TIME: 120.25 MSEC
E/A RATIO: 0.7
ECHO LV POSTERIOR WALL: 1 CM (ref 0.6–1.1)
EJECTION FRACTION: 53 %
ERV LLN: -16448.73
ERV PRE REF: 66 %
ERV REF: 1.27
FEF 25 75 LLN: 1.1
FEF 25 75 PRE REF: 43.6 %
FEF 25 75 REF: 2.71
FEV1 FVC LLN: 65
FEV1 FVC PRE REF: 93.7 %
FEV1 FVC REF: 77
FEV1 LLN: 2.41
FEV1 PRE REF: 45.2 %
FEV1 REF: 3.37
FRACTIONAL SHORTENING: 30 % (ref 28–44)
FRCPLETH LLN: 2.88
FRCPLETH PREREF: 97.7 %
FRCPLETH REF: 3.87
FVC LLN: 3.24
FVC PRE REF: 48.1 %
FVC REF: 4.39
HR MV ECHO: 88 BPM
INTERVENTRICULAR SEPTUM: 0.91 CM (ref 0.6–1.1)
IVC PRE: 2.38 L (ref 3.24–5.55)
IVC SINGLE BREATH LLN: 3.24
IVC SINGLE BREATH PRE REF: 54.3 %
IVC SINGLE BREATH REF: 4.39
LEFT ATRIUM SIZE: 4.48 CM
LEFT INTERNAL DIMENSION IN SYSTOLE: 3.27 CM (ref 2.1–4)
LEFT VENTRICLE DIASTOLIC VOLUME INDEX: 44.23 ML/M2
LEFT VENTRICLE DIASTOLIC VOLUME: 100.4 ML
LEFT VENTRICLE MASS INDEX: 67 G/M2
LEFT VENTRICLE SYSTOLIC VOLUME INDEX: 19 ML/M2
LEFT VENTRICLE SYSTOLIC VOLUME: 43.14 ML
LEFT VENTRICULAR INTERNAL DIMENSION IN DIASTOLE: 4.66 CM (ref 3.5–6)
LEFT VENTRICULAR MASS: 152.36 G
LV LATERAL E/E' RATIO: 4.08 M/S
LVOT MG: 1.53 MMHG
LVOT MV: 0.59 CM/S
MV PEAK A VEL: 0.76 M/S
MV PEAK E VEL: 0.53 M/S
MV STENOSIS PRESSURE HALF TIME: 34.87 MS
MV VALVE AREA P 1/2 METHOD: 6.31 CM2
MVV LLN: 129
MVV PRE REF: 39.5 %
MVV REF: 152
PEF LLN: 6.39
PEF PRE REF: 29 %
PEF REF: 9.35
PISA MRMAX VEL: 5.34 M/S
PISA TR MAX VEL: 3.06 M/S
PRE DLCO: 7.18 ML/(MIN*MMHG) (ref 25.2–39.06)
PRE ERV: 0.84 L (ref -16448.73–16451.27)
PRE FEF 25 75: 1.18 L/S (ref 1.1–5.05)
PRE FET 100: 6.88 SEC
PRE FEV1 FVC: 72.12 % (ref 65.17–87.4)
PRE FEV1: 1.52 L (ref 2.41–4.26)
PRE FRC PL: 3.78 L (ref 2.88–4.85)
PRE FVC: 2.11 L (ref 3.24–5.55)
PRE MVV: 59.99 L/MIN (ref 129.01–174.54)
PRE PEF: 2.71 L/S (ref 6.39–12.32)
PRE RV: 2.94 L (ref 1.92–3.27)
PRE TLC: 5.06 L (ref 6.79–9.09)
RA WIDTH: 4.6 CM
RAW LLN: 3.06
RAW PRE REF: 84.8 %
RAW PRE: 2.59 CMH2O*S/L (ref 3.06–3.06)
RAW REF: 3.06
RV LLN: 1.92
RV PRE REF: 113.2 %
RV REF: 2.6
RVTLC LLN: 29
RVTLC PRE REF: 152.2 %
RVTLC PRE: 58.05 % (ref 29.16–47.12)
RVTLC REF: 38
TDI LATERAL: 0.13 M/S
TLC LLN: 6.79
TLC PRE REF: 63.8 %
TLC REF: 7.94
TR MAX PG: 37 MMHG
TRICUSPID ANNULAR PLANE SYSTOLIC EXCURSION: 1.52 CM
VA PRE: 3.22 L (ref 7.79–7.79)
VA SINGLE BREATH LLN: 7.79
VA SINGLE BREATH PRE REF: 41.4 %
VA SINGLE BREATH REF: 7.79
VC LLN: 3.24
VC PRE REF: 48.4 %
VC PRE: 2.12 L (ref 3.24–5.55)
VC REF: 4.39

## 2023-02-01 PROCEDURE — 94010 BREATHING CAPACITY TEST: CPT

## 2023-02-01 PROCEDURE — 71250 CT THORAX DX C-: CPT | Mod: TC

## 2023-02-01 PROCEDURE — 94726 PLETHYSMOGRAPHY LUNG VOLUMES: CPT

## 2023-02-01 PROCEDURE — 94729 DIFFUSING CAPACITY: CPT

## 2023-02-01 PROCEDURE — 93306 TTE W/DOPPLER COMPLETE: CPT

## 2023-02-09 ENCOUNTER — OFFICE VISIT (OUTPATIENT)
Dept: RHEUMATOLOGY | Facility: CLINIC | Age: 63
End: 2023-02-09
Payer: MEDICAID

## 2023-02-09 VITALS
SYSTOLIC BLOOD PRESSURE: 111 MMHG | RESPIRATION RATE: 16 BRPM | TEMPERATURE: 99 F | HEIGHT: 74 IN | OXYGEN SATURATION: 99 % | WEIGHT: 225.19 LBS | BODY MASS INDEX: 28.9 KG/M2 | DIASTOLIC BLOOD PRESSURE: 79 MMHG | HEART RATE: 99 BPM

## 2023-02-09 DIAGNOSIS — J84.9 INTERSTITIAL LUNG DISEASE: ICD-10-CM

## 2023-02-09 DIAGNOSIS — I27.20 PULMONARY HTN: ICD-10-CM

## 2023-02-09 DIAGNOSIS — K50.119 CROHN'S DISEASE OF COLON WITH COMPLICATION: ICD-10-CM

## 2023-02-09 DIAGNOSIS — D49.7 FOLLICULAR NEOPLASM OF THYROID: ICD-10-CM

## 2023-02-09 DIAGNOSIS — D89.89 ANTISYNTHETASE SYNDROME: Primary | ICD-10-CM

## 2023-02-09 DIAGNOSIS — Z79.899 HIGH RISK MEDICATION USE: ICD-10-CM

## 2023-02-09 DIAGNOSIS — I10 HYPERTENSION, UNSPECIFIED TYPE: ICD-10-CM

## 2023-02-09 DIAGNOSIS — M05.79 RHEUMATOID ARTHRITIS INVOLVING MULTIPLE SITES WITH POSITIVE RHEUMATOID FACTOR: ICD-10-CM

## 2023-02-09 DIAGNOSIS — M17.11 OSTEOARTHRITIS OF RIGHT KNEE, UNSPECIFIED OSTEOARTHRITIS TYPE: ICD-10-CM

## 2023-02-09 DIAGNOSIS — M87.00 AVASCULAR NECROSIS OF BONE: ICD-10-CM

## 2023-02-09 PROCEDURE — 1159F PR MEDICATION LIST DOCUMENTED IN MEDICAL RECORD: ICD-10-PCS | Mod: CPTII,,, | Performed by: INTERNAL MEDICINE

## 2023-02-09 PROCEDURE — 1159F MED LIST DOCD IN RCRD: CPT | Mod: CPTII,,, | Performed by: INTERNAL MEDICINE

## 2023-02-09 PROCEDURE — 99215 PR OFFICE/OUTPT VISIT, EST, LEVL V, 40-54 MIN: ICD-10-PCS | Mod: S$PBB,,, | Performed by: INTERNAL MEDICINE

## 2023-02-09 PROCEDURE — 99215 OFFICE O/P EST HI 40 MIN: CPT | Mod: S$PBB,,, | Performed by: INTERNAL MEDICINE

## 2023-02-09 PROCEDURE — 3074F SYST BP LT 130 MM HG: CPT | Mod: CPTII,,, | Performed by: INTERNAL MEDICINE

## 2023-02-09 PROCEDURE — 3008F BODY MASS INDEX DOCD: CPT | Mod: CPTII,,, | Performed by: INTERNAL MEDICINE

## 2023-02-09 PROCEDURE — 3078F PR MOST RECENT DIASTOLIC BLOOD PRESSURE < 80 MM HG: ICD-10-PCS | Mod: CPTII,,, | Performed by: INTERNAL MEDICINE

## 2023-02-09 PROCEDURE — 3078F DIAST BP <80 MM HG: CPT | Mod: CPTII,,, | Performed by: INTERNAL MEDICINE

## 2023-02-09 PROCEDURE — 4010F ACE/ARB THERAPY RXD/TAKEN: CPT | Mod: CPTII,,, | Performed by: INTERNAL MEDICINE

## 2023-02-09 PROCEDURE — 3008F PR BODY MASS INDEX (BMI) DOCUMENTED: ICD-10-PCS | Mod: CPTII,,, | Performed by: INTERNAL MEDICINE

## 2023-02-09 PROCEDURE — 99215 OFFICE O/P EST HI 40 MIN: CPT | Mod: PBBFAC | Performed by: INTERNAL MEDICINE

## 2023-02-09 PROCEDURE — 3074F PR MOST RECENT SYSTOLIC BLOOD PRESSURE < 130 MM HG: ICD-10-PCS | Mod: CPTII,,, | Performed by: INTERNAL MEDICINE

## 2023-02-09 PROCEDURE — 4010F PR ACE/ARB THEARPY RXD/TAKEN: ICD-10-PCS | Mod: CPTII,,, | Performed by: INTERNAL MEDICINE

## 2023-02-09 RX ORDER — BUDESONIDE 3 MG/1
9 CAPSULE, COATED PELLETS ORAL DAILY PRN
COMMUNITY
Start: 2023-01-18 | End: 2023-11-09

## 2023-02-09 RX ORDER — TRAMADOL HYDROCHLORIDE 50 MG/1
50 TABLET ORAL EVERY 12 HOURS PRN
COMMUNITY
Start: 2023-01-04 | End: 2023-03-21 | Stop reason: ALTCHOICE

## 2023-02-09 RX ORDER — TOCILIZUMAB 180 MG/ML
162 INJECTION, SOLUTION SUBCUTANEOUS
Qty: 4 EACH | Refills: 2 | Status: SHIPPED | OUTPATIENT
Start: 2023-02-09 | End: 2023-03-21 | Stop reason: SDUPTHER

## 2023-02-09 RX ORDER — MEGESTROL ACETATE 40 MG/ML
SUSPENSION ORAL
COMMUNITY
Start: 2023-02-08 | End: 2023-07-20

## 2023-02-09 NOTE — PROGRESS NOTES
Patient ID: [id number]     Chief Complaint: Follow-up (No complaints/Has been doing better/Dr. Tarango took him off Ofev, trying to determine if he has Chron's and put him on Budesonide 3mg)    HPI:     Vic Meng is a 62 y.o. male here today for follow up of ILD, RA and antisynthetase overlap.    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 He was first seen by rheum in March 2021 and had been on prednisone 60mg/d.  He was started on steroid taper, Ofev and later Actemra. Right thyroid nodule FNA suspicious for follicular neoplasm s/p R thyroid lobectomy on 1/14/22. Path consistent with benign follicular adenoma. After diagnosis his Actemra was discontinued secondary to thyroid nodule and he was started on Rituximab. Received one dose of Rituximab 1000 mg on 3/2/22 and on 3/16/22. No issues with it.       No symptoms of acid reflux, heart burn, no episodes of choking. He stopped taking PPI.   He is here with walker today (last visit was in a wheelchair). Still has back and hip pain off and on, worse when he sits for extended periods.  He is on DASH diet and lost more than 100 lbs and he feels better, ankle pain and feet pain is better and he is able to move better now.   He is no longer using his oxygen at home at this time due to costs, states his insurance is not covering but does feel that he could use at times. Previous referral was made to pulmonary however he has not seen anyone, new referral was sent at his last visit.   Since his last visit Ofev was stopped as this may have been causing some of his symptoms of colitis- discussed with [facility] (Dr. Tarango and TRACY Ashby) and agreed trial to hold meds- they ordered Budesonide to see if helps.     Denies history of fevers, rashes, photosensitivity, oral or nasal ulcers, h/o MI, stroke, seizures,  h/o PE or DVT, Raynaud's phenomenon, uveitis.   Family history of autoimmune disease: none.   Smoking: half a pack in the past, smoking 3 cig per day.  40+ pack years- working on quitting completely   Social History     Tobacco Use   Smoking Status Every Day    Packs/day: 0.25    Types: Cigarettes   Smokeless Tobacco Never          ----------------------------  ADELA positive  Antisynthetase syndrome  Diabetes mellitus  GERD (gastroesophageal reflux disease)  Herpes zoster  Hypertension  ILD (interstitial lung disease)  Lymphadenopathy  Morbid obesity  Osteoarthritis of right knee  Rheumatoid arthritis  Snoring  Supraclavicular mass  Thyroid nodule  Venous insufficiency of lower extremity     Past Surgical History:   Procedure Laterality Date    Drainage of right thyroid gland lobe  10/07/2021    Laser surgery left leg Left     Right hand surgery         Review of patient's allergies indicates:  No Known Allergies    Outpatient Medications Marked as Taking for the 2/9/23 encounter (Office Visit) with Emeli Pizano MD   Medication Sig Dispense Refill    ADVAIR HFA 45-21 mcg/actuation HFAA inhaler Inhale 2 puffs into the lungs 2 (two) times daily.      alcohol antiseptic pads (ALCOHOL SWABS TOP)   Alcohol swabs, See Instructions, Alcohol swabs to clean prior to insulin injection 4 times a day E 11.65, # 120 EA, 11 Refill(s), Pharmacy: [facility], 179, cm, Height/Length Dosing, 11/23/21 7:40:00 CST, 138, kg, Weight Dosing, 11/23/21 7...      aspirin (ECOTRIN) 81 MG EC tablet Take 81 mg by mouth once daily.      BD ALCOHOL SWABS PadM Apply topically.      budesonide (ENTOCORT EC) 3 mg capsule Take 9 mg by mouth.      diclofenac (VOLTAREN) 75 MG EC tablet Take 75 mg by mouth daily as needed.      fluticasone propionate (FLONASE) 50 mcg/actuation nasal spray 1 spray by Each Nostril route 2 (two) times daily.      furosemide (LASIX) 20 MG tablet Take 20 mg by mouth daily as needed.       lisinopriL-hydrochlorothiazide (PRINZIDE,ZESTORETIC) 20-25 mg Tab Take 1 tablet by mouth once daily.      TRUE METRIX GO GLUCOSE METER [facility] AS DIRECTED      WALKER MISC   walker, See Instructions, osteoarthritis of hips problems ambulating, # 1 EA, 0 Refill(s)      XIIDRA 5 % Dpet Place 1 drop into both eyes 2 (two) times daily.         Social History     Socioeconomic History    Marital status: Single   Tobacco Use    Smoking status: Every Day     Packs/day: 0.25     Types: Cigarettes    Smokeless tobacco: Never   Substance and Sexual Activity    Alcohol use: Not Currently    Drug use: Yes     Types: Marijuana     Comment: Occasionally        History reviewed. No pertinent family history.       There is no immunization history on file for this patient.    Patient Care Team:  Sarabjit Arreaga MD as PCP - General (Internal Medicine)     Subjective:     ROS    Constitutional:  Denies chills. Denies fever. Denies night sweats. Denies weight loss.   Ophthalmology: Denies blurred vision. Admits  dry eyes. Denies eye pain. Denies Itching and redness.   ENT: Denies oral ulcers. Denies epistaxis. Admits dry mouth. Denies swollen glands.   Endocrine: Admits diabetes. Admits thyroid Problems, history of cancer.  Respiratory: Admits cough. Denies shortness of breath. Admits shortness of breath with exertion. Denies hemoptysis.   Cardiovascular: Denies chest pain at rest. Denies chest pain with exertion. Denies palpitations.    Gastrointestinal: Denies abdominal pain. Admits to constipation. Denies diarrhea. Denies nausea. Denies vomiting. Denies hematemesis or hematochezia. Denies heartburn.  Genitourinary: Denies blood in urine.  Musculoskeletal: See HPI for details  Integumentary: Denies rash. Denies photosensitivity.   Peripheral Vascular: Denies Ulcers of hands and/or feet. Denies Cold extremities.   Neurologic: Denies dizziness. Denies headache.  Denies loss of strength. Denies numbness or tingling.   Psychiatric: Denies  "depression. Denies anxiety. Denies suicidal/homicidal ideations.      Objective:     /79 (BP Location: Right arm, Patient Position: Sitting)   Pulse 99   Temp 98.5 °F (36.9 °C) (Oral)   Resp 16   Ht 6' 2" (1.88 m)   Wt 102.2 kg (225 lb 3.2 oz)   SpO2 99%   BMI 28.91 kg/m²     Physical Exam    General Appearance: alert, pleasant, in no acute distress.  Skin: Skin color, texture, turgor normal. No rashes or lesions.  Eyes:  extraocular movement intact (EOMI), pupils equal, round, reactive to light and accommodation, conjunctiva clear.  ENT: No oral or nasal ulcers.  Neck:  Neck supple. No adenopathy.   Lungs: CTA throughout without crackles, rhonchi, or wheezes. Mild clubbing present.   Heart: RRR w/o murmurs.  No edema.   Abdomen: Soft, non-tender, no masses, rebound or guarding.  Neuro: Alert, oriented, CN II-XII GI, sensory and motor innervation intact.  Musculoskeletal: Flexion deformity of right second and fourth PIP, Degenerative changes to bilateral hands. No active synovitis noted. History of synovitis in MCP's, right wrist and left ankle.   Psych: Alert, oriented, normal eye contact.    Labs:   1/4/23:  Hemoglobin 10.5.  ESR 84, normal less than 15.  Potassium slightly low 3.4.  CRP 8.2, normal less than 5.  LFTs normal.  C3, C4 okay.  CPK normal.  Hepatitis-B, hepatitis-C, HIV, quantifeorn tb negative. Trace protein and no blood.  mg/G. Aldolase normal  9/30/2022- Infection panel (hep B/C/Quantiferon) negative.   3/12/2021: C3/C4 ok, Aldolase ok. CRP 1.10 (elevated). TSH 0.2289 (l), TPO Ab <0.9, Thyroglobulin 46.8 (h), Sed 16 (<15), dsDNA <1:10, RNP Ab IgG 71 (moderately positive). RF IgA 13, RF IgG 10, RF IgM 52.   4/20/2021: TPO negative.   11/10/2020: ADELA 1:2560 Speckled.      Lab Results   Component Value Date    WBC 7.5 01/04/2023    HGB 10.5 (L) 01/04/2023    HCT 31.2 (L) 01/04/2023     01/04/2023    ALT 14 01/04/2023    AST 12 01/04/2023    BUN 19.2 01/04/2023    CREATININE " 0.93 2023     2023    K 3.4 (L) 2023    CO2 23 2023    CRP 8.20 (H) 2023    SEDRATE 84 (H) 2023        Imagin2021 CTA Chest NonCoronary: Suboptimal and limited study without gross evidence of pulmonary embolus. Bilateral progressed pulmonary changes reflective of congestive process without exclusion of associated atypical infection related to Covid 19. Bilateral dependent small amount of pleural effusions  3/31/2021 HR CT Thorax W/o Contrast:  Bilateral subpleural reticular densities with mild lower lung bronchiectasis and possible early honeycombing. Pattern of changes is probable for UIP.  23: CT chest: slight progression of the bilateral ground-glass interstitial lung changes with associated bronchiectasis.  Findings have a basilar predominance. Mild areas of developing fibrotic changes are seen in the right middle lobe in the periphery.  This was seen on the prior examination as well.  The upper lobes are relatively spared. There is some subcentimeter lymphadenopathy in the axillary regions bilaterally as well as the mediastinum, unchanged.    2020 PFT: FEV1 39%; FVC  38%; TLC  52%; DLCO  33%.  23: PFT's: FVC 48%, FEV1 45%, ratio 72, 93%, TLC 63%, %, DLCO 22%, DLCO/VA 55%.     CT ab/pelvis: 22: No acute abnormality of the abdomen and pelvis. 2. Changes consistent with chronic interstitial fibrosis in the bilateral lung bases.    2021 Xray Left Hip: Sclerotic changes identified in relation to the femoral head changes of avascular necrosis cannot be completely excluded and therefore other imaging modalities might prove helpful for further assessment. Degenerative changes.   Xray Right Hip: Sclerotic changes identified in relation to the femoral head changes of avascular necrosis cannot be completely excluded and therefore other imaging modalities might prove helpful for further assessment.  Degenerative changes.    Echo from   shows LVEF 50-55% w/ mild tricuspid regurg and RVSP 63 mmHg.   1/2021: Pulmonary hypertension RVSP 63    10/5/2021: ECHO: LV EF 50%    01/08/2021  XR Esophagram:  Small sliding hiatal hernia with intermittent reflux.     3/8/22 Chest Xray:  Prominent interstitial markings with subsegmental atelectatic changes of the bilateral bases.   10/6/2020 Right Knee Xray: Degenerative changes most pronounced in the medial joint compartment.     Echo 2/1/23: The left ventricle is normal in size with low normal systolic function. Mild right ventricular enlargement with low normal right ventricular systolic function. The estimated ejection fraction is 53%. Grade I left ventricular diastolic dysfunction. PASP 37 mgHg.         Assessment:       ICD-10-CM ICD-9-CM   1. Antisynthetase syndrome  D89.89 279.49   2. Rheumatoid arthritis involving multiple sites with positive rheumatoid factor  M05.79 714.0   3. Interstitial lung disease  J84.9 515   4. High risk medication use  Z79.899 V58.69   5. Crohn's disease of colon with complication  K50.119 555.1   6. Osteoarthritis of right knee, unspecified osteoarthritis type  M17.11 715.96   7. Hypertension, unspecified type  I10 401.9   8. Follicular neoplasm of thyroid  D49.7 239.7   9. Pulmonary HTN  I27.20 416.8   10. Avascular necrosis of bone  M87.00 733.40          Plan:     1. Antisynthetase syndrome: ADELA 1:2560 cytoplasmic / speckled and +PL-12 on myositis panel w/ adv ILD. Hx of oral ulcers, LAD on imaging, and inflammatory joint pain). Positive RNP, MCTD and myositis overlap. Using a walker today for hip and back pain, avascular necrosis of hips, muscle strength is ok. Normal CK, aldolase.  - Received one dose of Rituximab 1000 mg on 3/2/22 and on 3/16/22.   - Advanced ILD, UIP pattern. 2/1/23: CT chest: slight progression of the bilateral ground-glass interstitial lung changes with associated bronchiectasis.  Findings have a basilar predominance. Mild areas of developing fibrotic  changes are seen in the right middle lobe in the periphery.  This was seen on the prior examination as well.  Subcentimeter lymphadenopathy in the axillary regions bilaterally as well as the mediastinum, unchanged.  -2/2023 ECHO EF 53%. Grade I left ventricular diastolic dysfunction. PASP 37 mgHg.   -2/1/23: PFT's: FVC 48%, FEV1 45%, ratio 72, 93%, TLC 63%, %, DLCO 22%, DLCO/VA 55%.   - Can consider restarting OFEV in the future if needed at a low dose.  -Re-start Actemra 162 mg/0.9 ml subq every 7 days- demo today in clinic.      2. Rheumatoid arthritis involving multiple sites with positive rheumatoid factor:Strongly + CCP and RF.  Previously on Actemra. No active synovitis on exam. Pain in hips secondary to OA and avascular necrosis.   - He was on Actemra in the past- tolerated well- was d/c due to concern for possible thyroid ca however bx benign. Received one dose of Rituximab 1000 mg on 3/2/22 and on 3/16/22.   - Re-start Actemra 162 mg subq every 7 days as mentioned above.     3. Interstitial lung disease: Severe ILD secondary to RA, overlap w/ antisynthetase syndrome, MCTD. PFT show severe restriction.   - Initially in March 2021, he had been on prednisone 60mg/d for more than a year in the past, off of steroids now. Actemra d/c 2/2 questionable thyroid malignancy, path came back as benign.   - Not on home O2 due to cost issues. Referral sent to pulm at last OV.  - Received one dose of Rituximab 1000 mg on 3/2/22 and on 3/16/22. No issues with it.   -Continue to hold OFEV as previously directed- possibly associated with colitis- GI treating with steroids at this time.    -Advanced ILD, UIP pattern. 2/1/23: CT chest: slight progression of the bilateral ground-glass interstitial lung changes and PFT's showed slight worsening in DLCO.  - Discussed smoking cessation.      4. High risk medication use   -Advised to stay up-to-date on age appropriate vaccinations and malignancy screening with PCP.   -Persons  with rheumatoid arthritis, lupus, psoriatic arthritis and other autoimmune diseases are at increased risk of cardiovascular disease including heart attack and stroke. -We recommend that all patients with these conditions have annual health maintenance exams including lipid measurements, blood pressure measurements, and smoking cessation counseling when applicable at their primary care provider's office.  -Continued lab monitoring.      5. Crohn's disease of colon per patient, requested records form [facility]. Was hospitalized for abdominal pain in 9/2022 at [facility], reviewed records, he was seen by Dr Tarango. CT abdo/pelvis normal on 9/27/22. Colonoscopy on 9/30/22 showed mod erythema and scattered ulcerations on transverse colon, severe ulcers in ascending colon, inflammation of cecum, biopsy taken, suspicious for crohn's. Biopsy showed active ulcer, reactive changes, no malignancy, mild crypt architectural disarray, dd include infection vs ILD.   -Did discuss with Symone MOLINA with Dr. Tarango- possibly caused by OFEV- patient currently holding OFEV and on Budesonide with GI.      6. Hypertension, T2DM:   -Followed by PCP. He had seen endo in the past.      7. Osteoarthritis of right knee: Recommend stretches and exercises, tylenol as needed. In WC because of arthritis.     8. Right thyroid nodule FNA suspicious for follicular neoplasm s/p R thyroid lobectomy on 1/14/22. Path consistent with benign follicular adenoma. Had seen ENT and was discharged after the pathology results. No other nodules noted (per ENT notes 2/16/22) in intra-op or on UA. Benign lesion.     9. Pulmonary HTN: RVSP 41 mmHg (down from 63). Mild-to-Mod tricuspid regurg. Previously planned referral to [facility] for pulm HTN eval, but pt reports he can not get there.   - 2/2023 ECHO EF 53%. Grade I left ventricular diastolic dysfunction. PASP 37 mgHg.      10. Avascular necrosis of the Hips. 2/2 to chronic high dose steroid  use. Wheelchair bound however today he is using a walker. Continue follow up with ortho.           Follow up in about 6 weeks (around 3/23/2023). In addition to their scheduled follow up, the patient has also been instructed to follow up on as needed basis.      Total time spent with patient and documentation is more than 40 minutes. All questions were answered to patient's satisfaction and patient verbalized understanding.

## 2023-02-23 ENCOUNTER — TELEPHONE (OUTPATIENT)
Dept: RHEUMATOLOGY | Facility: CLINIC | Age: 63
End: 2023-02-23
Payer: MEDICAID

## 2023-02-23 NOTE — TELEPHONE ENCOUNTER
Received fax notice re: PA for Actemra  Submitted on Cover My Meds and got message the PA is pending and doesn't need to be resubmitted

## 2023-02-23 NOTE — TELEPHONE ENCOUNTER
Received another fax from [id number] re: PA for Actemra on [facility], submitted  Key: [id number] - PA Case ID: [id number]

## 2023-03-21 ENCOUNTER — OFFICE VISIT (OUTPATIENT)
Dept: RHEUMATOLOGY | Facility: CLINIC | Age: 63
End: 2023-03-21
Payer: MEDICAID

## 2023-03-21 VITALS
SYSTOLIC BLOOD PRESSURE: 103 MMHG | HEART RATE: 95 BPM | DIASTOLIC BLOOD PRESSURE: 71 MMHG | HEIGHT: 74 IN | BODY MASS INDEX: 30.62 KG/M2 | WEIGHT: 238.63 LBS | RESPIRATION RATE: 16 BRPM | TEMPERATURE: 98 F | OXYGEN SATURATION: 97 %

## 2023-03-21 DIAGNOSIS — K50.119 CROHN'S DISEASE OF COLON WITH COMPLICATION: ICD-10-CM

## 2023-03-21 DIAGNOSIS — M87.00 AVASCULAR NECROSIS OF BONE: ICD-10-CM

## 2023-03-21 DIAGNOSIS — M05.79 RHEUMATOID ARTHRITIS INVOLVING MULTIPLE SITES WITH POSITIVE RHEUMATOID FACTOR: ICD-10-CM

## 2023-03-21 DIAGNOSIS — Z79.899 HIGH RISK MEDICATION USE: ICD-10-CM

## 2023-03-21 DIAGNOSIS — D89.89 ANTISYNTHETASE SYNDROME: Primary | ICD-10-CM

## 2023-03-21 DIAGNOSIS — I27.20 PULMONARY HTN: ICD-10-CM

## 2023-03-21 DIAGNOSIS — J84.9 INTERSTITIAL LUNG DISEASE: ICD-10-CM

## 2023-03-21 DIAGNOSIS — E04.1 THYROID NODULE: ICD-10-CM

## 2023-03-21 DIAGNOSIS — M17.11 OSTEOARTHRITIS OF RIGHT KNEE, UNSPECIFIED OSTEOARTHRITIS TYPE: ICD-10-CM

## 2023-03-21 PROCEDURE — 3074F PR MOST RECENT SYSTOLIC BLOOD PRESSURE < 130 MM HG: ICD-10-PCS | Mod: CPTII,,, | Performed by: INTERNAL MEDICINE

## 2023-03-21 PROCEDURE — 3078F DIAST BP <80 MM HG: CPT | Mod: CPTII,,, | Performed by: INTERNAL MEDICINE

## 2023-03-21 PROCEDURE — 1159F MED LIST DOCD IN RCRD: CPT | Mod: CPTII,,, | Performed by: INTERNAL MEDICINE

## 2023-03-21 PROCEDURE — 3008F PR BODY MASS INDEX (BMI) DOCUMENTED: ICD-10-PCS | Mod: CPTII,,, | Performed by: INTERNAL MEDICINE

## 2023-03-21 PROCEDURE — 1159F PR MEDICATION LIST DOCUMENTED IN MEDICAL RECORD: ICD-10-PCS | Mod: CPTII,,, | Performed by: INTERNAL MEDICINE

## 2023-03-21 PROCEDURE — 99215 PR OFFICE/OUTPT VISIT, EST, LEVL V, 40-54 MIN: ICD-10-PCS | Mod: S$PBB,,, | Performed by: INTERNAL MEDICINE

## 2023-03-21 PROCEDURE — 3008F BODY MASS INDEX DOCD: CPT | Mod: CPTII,,, | Performed by: INTERNAL MEDICINE

## 2023-03-21 PROCEDURE — 4010F ACE/ARB THERAPY RXD/TAKEN: CPT | Mod: CPTII,,, | Performed by: INTERNAL MEDICINE

## 2023-03-21 PROCEDURE — 99215 OFFICE O/P EST HI 40 MIN: CPT | Mod: PBBFAC | Performed by: INTERNAL MEDICINE

## 2023-03-21 PROCEDURE — 99215 OFFICE O/P EST HI 40 MIN: CPT | Mod: S$PBB,,, | Performed by: INTERNAL MEDICINE

## 2023-03-21 PROCEDURE — 3078F PR MOST RECENT DIASTOLIC BLOOD PRESSURE < 80 MM HG: ICD-10-PCS | Mod: CPTII,,, | Performed by: INTERNAL MEDICINE

## 2023-03-21 PROCEDURE — 3074F SYST BP LT 130 MM HG: CPT | Mod: CPTII,,, | Performed by: INTERNAL MEDICINE

## 2023-03-21 PROCEDURE — 4010F PR ACE/ARB THEARPY RXD/TAKEN: ICD-10-PCS | Mod: CPTII,,, | Performed by: INTERNAL MEDICINE

## 2023-03-21 RX ORDER — NAPROXEN 500 MG/1
500 TABLET ORAL 2 TIMES DAILY PRN
COMMUNITY
Start: 2023-02-16 | End: 2023-11-09

## 2023-03-21 RX ORDER — CONTAINER,EMPTY
EACH MISCELLANEOUS
COMMUNITY
Start: 2023-02-24

## 2023-03-21 RX ORDER — TOCILIZUMAB 180 MG/ML
162 INJECTION, SOLUTION SUBCUTANEOUS
Qty: 4 EACH | Refills: 2 | Status: SHIPPED | OUTPATIENT
Start: 2023-03-21 | End: 2023-06-13 | Stop reason: SDUPTHER

## 2023-03-21 RX ORDER — ZOLPIDEM TARTRATE 10 MG/1
10 TABLET ORAL NIGHTLY
COMMUNITY
Start: 2023-02-16 | End: 2023-03-21 | Stop reason: ALTCHOICE

## 2023-03-21 NOTE — PROGRESS NOTES
Patient ID: [id number]     Chief Complaint: Antisynthetase syndrome (Has been feeling alright /)    HPI:     Vic Meng is a 62 y.o. male here today for follow up of ILD, RA and antisynthetase overlap.    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 He was first seen by rheum in March 2021 and had been on prednisone 60mg/d.  He was started on steroid taper, Ofev and later Actemra. Right thyroid nodule FNA suspicious for follicular neoplasm s/p R thyroid lobectomy on 1/14/22. Path consistent with benign follicular adenoma. After diagnosis his Actemra was discontinued secondary to thyroid nodule and he was started on Rituximab. Received one dose of Rituximab 1000 mg on 3/2/22 and on 3/16/22. No issues with it.     No symptoms of acid reflux, heart burn, no episodes of choking. He stopped taking PPI.   He is here with walker today (last visit was in a wheelchair). Still has back and hip pain off and on, worse when he sits for extended periods.  He is on DASH diet and lost more than 100 lbs and he feels better, ankle pain and feet pain is better and he is able to move better now.   He is no longer using his oxygen at home at this time due to costs, states his insurance is not covering but does feel that he could use at times. Previous referral was made to pulmonary however he has not seen anyone, new referral was sent, he has appointment in 5/2023.  Ofev was stopped as this may have caused symptoms of colitis- discussed with [facility] (Dr. Tarango and TRACY Ashby) and agreed trial to hold meds- they ordered Budesonide to see if helps. Tolerating it well. Feeling better, diarrhea resolved.     Denies history of fevers, rashes, photosensitivity, oral or nasal ulcers, h/o MI, stroke, seizures, h/o PE or DVT, Raynaud's phenomenon, uveitis.   Family history of  autoimmune disease: none.   Smoking: half a pack in the past, smoking 3 cig per day.  40+ pack years- working on quitting completely     Social History     Tobacco Use   Smoking Status Every Day    Packs/day: 0.25    Types: Cigarettes   Smokeless Tobacco Never   Tobacco Comments    Only about 2 cigarettes a day           ----------------------------  ADELA positive  Antisynthetase syndrome  Diabetes mellitus  GERD (gastroesophageal reflux disease)  Herpes zoster  Hypertension  ILD (interstitial lung disease)  Lymphadenopathy  Morbid obesity  Osteoarthritis of right knee  Rheumatoid arthritis  Snoring  Supraclavicular mass  Thyroid nodule  Venous insufficiency of lower extremity     Past Surgical History:   Procedure Laterality Date    Drainage of right thyroid gland lobe  10/07/2021    Laser surgery left leg Left     Right hand surgery         Review of patient's allergies indicates:  No Known Allergies    Outpatient Medications Marked as Taking for the 3/21/23 encounter (Office Visit) with Emeli Pizano MD   Medication Sig Dispense Refill    ADVAIR HFA 45-21 mcg/actuation HFAA inhaler Inhale 2 puffs into the lungs 2 (two) times daily.      alcohol antiseptic pads (ALCOHOL SWABS TOP)   Alcohol swabs, See Instructions, Alcohol swabs to clean prior to insulin injection 4 times a day E 11.65, # 120 EA, 11 Refill(s), Pharmacy: [facility], 179, cm, Height/Length Dosing, 11/23/21 7:40:00 CST, 138, kg, Weight Dosing, 11/23/21 7...      aspirin (ECOTRIN) 81 MG EC tablet Take 81 mg by mouth once daily.      BD ALCOHOL SWABS PadM Apply topically.      budesonide (ENTOCORT EC) 3 mg capsule Take 9 mg by mouth.      diclofenac (VOLTAREN) 75 MG EC tablet Take 75 mg by mouth daily as needed.      fluticasone propionate (FLONASE) 50 mcg/actuation nasal spray 1 spray by Each Nostril route 2 (two) times daily.      KRISTALOSE 20 gram Pack Take 20 g by mouth daily as needed.      lisinopriL-hydrochlorothiazide  (PRINZIDE,ZESTORETIC) 20-25 mg Tab Take 1 tablet by mouth once daily.      multivit-min/FA/lycopen/lutein (CENTRUM SILVER MEN ORAL) Take by mouth.      naproxen (NAPROSYN) 500 MG tablet Take 500 mg by mouth 2 (two) times daily.      SHARPSAFETY CONTAINER Misc use as directed      tocilizumab (ACTEMRA ACTPEN) 162 mg/0.9 mL PnIj Inject 162 mg into the skin every 7 days. 4 each 2    TRUE METRIX GO GLUCOSE METER Misc AS DIRECTED      WALKER MISC   walker, See Instructions, osteoarthritis of hips problems ambulating, # 1 EA, 0 Refill(s)      XIIDRA 5 % Dpet Place 1 drop into both eyes 2 (two) times daily.         Social History     Socioeconomic History    Marital status: Single   Tobacco Use    Smoking status: Every Day     Packs/day: 0.25     Types: Cigarettes    Smokeless tobacco: Never    Tobacco comments:     Only about 2 cigarettes a day    Substance and Sexual Activity    Alcohol use: Not Currently    Drug use: Yes     Types: Marijuana     Comment: Occasionally        History reviewed. No pertinent family history.       There is no immunization history on file for this patient.    Patient Care Team:  Sarabjit Arreaga MD as PCP - General (Internal Medicine)     Subjective:     ROS    Constitutional:  Denies chills. Denies fever. Denies night sweats. Denies weight loss.   Ophthalmology: Denies blurred vision. Admits  dry eyes. Denies eye pain. Denies Itching and redness.   ENT: Denies oral ulcers. Denies epistaxis. Admits dry mouth. Denies swollen glands.   Endocrine: Admits diabetes. Admits thyroid Problems, history of cancer.  Respiratory: Admits cough. Denies shortness of breath. Admits shortness of breath with exertion. Denies hemoptysis.   Cardiovascular: Denies chest pain at rest. Denies chest pain with exertion. Denies palpitations.    Gastrointestinal: Denies abdominal pain. Admits to constipation. Denies diarrhea. Denies nausea. Denies vomiting. Denies hematemesis or hematochezia. Denies  "heartburn.  Genitourinary: Denies blood in urine.  Musculoskeletal: See HPI for details  Integumentary: Denies rash. Denies photosensitivity.   Peripheral Vascular: Denies Ulcers of hands and/or feet. Denies Cold extremities.   Neurologic: Denies dizziness. Denies headache.  Denies loss of strength. Denies numbness or tingling.   Psychiatric: Denies depression. Denies anxiety. Denies suicidal/homicidal ideations.      Objective:     /71 (BP Location: Right arm, Patient Position: Sitting)   Pulse 95   Temp 98.3 °F (36.8 °C) (Oral)   Resp 16   Ht 6' 2" (1.88 m)   Wt 108.2 kg (238 lb 9.6 oz)   SpO2 97%   BMI 30.63 kg/m²     Physical Exam    General Appearance: alert, pleasant, in no acute distress.  Skin: Skin color, texture, turgor normal. No rashes or lesions.  Eyes:  extraocular movement intact (EOMI), pupils equal, round, reactive to light and accommodation, conjunctiva clear.  ENT: No oral or nasal ulcers.  Neck:  Neck supple. No adenopathy.   Lungs: CTA throughout without crackles, rhonchi, or wheezes. Mild clubbing present.   Heart: RRR w/o murmurs.  No edema.   Abdomen: Soft, non-tender, no masses, rebound or guarding.  Neuro: Alert, oriented, CN II-XII GI, sensory and motor innervation intact.  Musculoskeletal: Flexion deformity of right second and fourth PIP, Degenerative changes to bilateral hands. No active synovitis noted. History of synovitis in MCP's, right wrist and left ankle.   Psych: Alert, oriented, normal eye contact.    Labs:   1/4/23:  Hemoglobin 10.5.  ESR 84, normal less than 15.  Potassium slightly low 3.4.  CRP 8.2, normal less than 5.  LFTs normal.  C3, C4 okay.  CPK normal.  Hepatitis-B, hepatitis-C, HIV, quantiferon tb negative. Trace protein and no blood.  mg/G. Aldolase normal  9/30/2022- Infection panel (hep B/C/Quantiferon) negative.   3/12/2021: C3/C4 ok, Aldolase ok. CRP 1.10 (elevated). TSH 0.2289 (l), TPO Ab <0.9, Thyroglobulin 46.8 (h), Sed 16 (<15), dsDNA " <1:10, RNP Ab IgG 71 (moderately positive). RF IgA 13, RF IgG 10, RF IgM 52.   2021: TPO negative.   11/10/2020: ADELA 1:2560 Speckled.      Lab Results   Component Value Date    WBC 7.5 2023    HGB 10.5 (L) 2023    HCT 31.2 (L) 2023     2023    ALT 14 2023    AST 12 2023    BUN 19.2 2023    CREATININE 0.93 2023     2023    K 3.4 (L) 2023    CO2 23 2023    CRP 8.20 (H) 2023    SEDRATE 84 (H) 2023        Imagin2021 CTA Chest NonCoronary: Suboptimal and limited study without gross evidence of pulmonary embolus. Bilateral progressed pulmonary changes reflective of congestive process without exclusion of associated atypical infection related to Covid 19. Bilateral dependent small amount of pleural effusions  3/31/2021 HR CT Thorax W/o Contrast:  Bilateral subpleural reticular densities with mild lower lung bronchiectasis and possible early honeycombing. Pattern of changes is probable for UIP.    23: CT chest: slight progression of the bilateral ground-glass interstitial lung changes with associated bronchiectasis.  Findings have a basilar predominance. Mild areas of developing fibrotic changes are seen in the right middle lobe in the periphery.  This was seen on the prior examination as well.  The upper lobes are relatively spared. There is some subcentimeter lymphadenopathy in the axillary regions bilaterally as well as the mediastinum, unchanged.    2020 PFT: FEV1 39%; FVC  38%; TLC  52%; DLCO  33%.  23: PFT's: FVC 48%, FEV1 45%, ratio 72, 93%, TLC 63%, %, DLCO 22%, DLCO/VA 55%.     CT ab/pelvis: 22: No acute abnormality of the abdomen and pelvis. 2. Changes consistent with chronic interstitial fibrosis in the bilateral lung bases.    2021 Xray Left Hip: Sclerotic changes identified in relation to the femoral head changes of avascular necrosis cannot be completely excluded and therefore  other imaging modalities might prove helpful for further assessment. Degenerative changes.   Xray Right Hip: Sclerotic changes identified in relation to the femoral head changes of avascular necrosis cannot be completely excluded and therefore other imaging modalities might prove helpful for further assessment.  Degenerative changes.    Echo from 11/20 shows LVEF 50-55% w/ mild tricuspid regurg and RVSP 63 mmHg.   1/2021: Pulmonary hypertension RVSP 63    10/5/2021: ECHO: LV EF 50%    01/08/2021  XR Esophagram:  Small sliding hiatal hernia with intermittent reflux.     3/8/22 Chest Xray:  Prominent interstitial markings with subsegmental atelectatic changes of the bilateral bases.   10/6/2020 Right Knee Xray: Degenerative changes most pronounced in the medial joint compartment.     Echo 2/1/23: The left ventricle is normal in size with low normal systolic function. Mild right ventricular enlargement with low normal right ventricular systolic function. The estimated ejection fraction is 53%. Grade I left ventricular diastolic dysfunction. PASP 37 mgHg.         Assessment:       ICD-10-CM ICD-9-CM   1. Antisynthetase syndrome  D89.89 279.49   2. Rheumatoid arthritis involving multiple sites with positive rheumatoid factor  M05.79 714.0   3. Interstitial lung disease  J84.9 515   4. High risk medication use  Z79.899 V58.69   5. Crohn's disease of colon with complication  K50.119 555.1   6. Osteoarthritis of right knee, unspecified osteoarthritis type  M17.11 715.96   7. Thyroid nodule  E04.1 241.0   8. Pulmonary HTN  I27.20 416.8   9. Avascular necrosis of bone  M87.00 733.40            Plan:     1. Antisynthetase syndrome: ADELA 1:2560 cytoplasmic / speckled and +PL-12 on myositis panel w/ adv ILD. Hx of oral ulcers, LAD on imaging, and inflammatory joint pain). Positive RNP, MCTD and myositis overlap. Using a walker today for hip and back pain, avascular necrosis of hips, muscle strength is ok. Normal CK, aldolase.  -  Received one dose of Rituximab 1000 mg on 3/2/22 and on 3/16/22.   - Advanced ILD, UIP pattern. 2/1/23: CT chest: slight progression of the bilateral ground-glass interstitial lung changes with associated bronchiectasis.  Findings have a basilar predominance. Mild areas of developing fibrotic changes are seen in the right middle lobe in the periphery.  This was seen on the prior examination as well.  Subcentimeter lymphadenopathy in the axillary regions bilaterally as well as the mediastinum, unchanged.  -2/2023 ECHO EF 53%. Grade I left ventricular diastolic dysfunction. PASP 37 mgHg.   -2/1/23: PFT's: FVC 48%, FEV1 45%, ratio 72, 93%, TLC 63%, %, DLCO 22%, DLCO/VA 55%.   - Can consider restarting OFEV in the future if needed at a low dose.  -Re-started Actemra 162 mg/0.9 ml subq every 7 days.   - Labs today.     2. Rheumatoid arthritis involving multiple sites with positive rheumatoid factor:Strongly + CCP and RF.  Previously on Actemra. No active synovitis on exam. Pain in hips secondary to OA and avascular necrosis.   - He was on Actemra in the past- tolerated well- was d/c due to concern for possible thyroid ca however bx benign. Received one dose of Rituximab 1000 mg on 3/2/22 and on 3/16/22.   - Re-started Actemra 162 mg subq every 7 days as mentioned above.     3. Interstitial lung disease: Severe ILD secondary to RA, overlap w/ antisynthetase syndrome, MCTD. PFT show severe restriction.   - Initially in March 2021, he had been on prednisone 60mg/d for more than a year in the past, off of steroids now. Actemra d/c 2/2 questionable thyroid malignancy, path came back as benign.   - Not on home O2 due to cost issues. Referral sent to pulm at last OV.  - Received one dose of Rituximab 1000 mg on 3/2/22 and on 3/16/22. No issues with it.   -Continue to hold OFEV as previously directed- possibly associated with colitis- GI treating with steroids at this time.    -Advanced ILD, UIP pattern. 2/1/23: CT chest:  slight progression of the bilateral ground-glass interstitial lung changes and PFT's showed slight worsening in DLCO.  - Discussed smoking cessation.      4. High risk medication use   -Advised to stay up-to-date on age appropriate vaccinations and malignancy screening with PCP.   -Persons with rheumatoid arthritis, lupus, psoriatic arthritis and other autoimmune diseases are at increased risk of cardiovascular disease including heart attack and stroke. -We recommend that all patients with these conditions have annual health maintenance exams including lipid measurements, blood pressure measurements, and smoking cessation counseling when applicable at their primary care provider's office.  -Continued lab monitoring.      5. Crohn's disease of colon per patient, requested records form [facility]. Was hospitalized for abdominal pain in 9/2022 at [facility], reviewed records, he was seen by Dr Tarango. CT abdo/pelvis normal on 9/27/22. Colonoscopy on 9/30/22 showed mod erythema and scattered ulcerations on transverse colon, severe ulcers in ascending colon, inflammation of cecum, biopsy taken, suspicious for crohn's. Biopsy showed active ulcer, reactive changes, no malignancy, mild crypt architectural disarray, dd include infection vs ILD.   -Did discuss with Symone MOLINA with Dr. Tarango- possibly caused by OFEV- patient currently holding OFEV and on Budesonide with GI.      6. Hypertension, T2DM:   -Followed by PCP. He had seen endo in the past.      7. Osteoarthritis of right knee: Recommend stretches and exercises, tylenol as needed. In WC because of arthritis.     8. Right thyroid nodule FNA suspicious for follicular neoplasm s/p R thyroid lobectomy on 1/14/22. Path consistent with benign follicular adenoma. Had seen ENT and was discharged after the pathology results. No other nodules noted (per ENT notes 2/16/22) in intra-op or on UA. Benign lesion.     9. Pulmonary HTN: RVSP 41 mmHg (down from 63).  Mild-to-Mod tricuspid regurg. Previously planned referral to [facility] for pulm HTN eval, but pt reports he can not get there.   - 2/2023 ECHO EF 53%. Grade I left ventricular diastolic dysfunction. PASP 37 mgHg.      10. Avascular necrosis of the Hips. 2/2 to chronic high dose steroid use. Wheelchair bound however sometimes uses walker. Continue follow up with ortho.           Follow up in about 3 months (around 6/21/2023). In addition to their scheduled follow up, the patient has also been instructed to follow up on as needed basis.      Total time spent with patient and documentation is more than 40 minutes. All questions were answered to patient's satisfaction and patient verbalized understanding.

## 2023-03-22 ENCOUNTER — TELEPHONE (OUTPATIENT)
Dept: RHEUMATOLOGY | Facility: CLINIC | Age: 63
End: 2023-03-22
Payer: MEDICAID

## 2023-06-12 ENCOUNTER — LAB VISIT (OUTPATIENT)
Dept: LAB | Facility: HOSPITAL | Age: 63
End: 2023-06-12
Attending: INTERNAL MEDICINE
Payer: MEDICAID

## 2023-06-12 DIAGNOSIS — J84.9 INTERSTITIAL LUNG DISEASE: ICD-10-CM

## 2023-06-12 DIAGNOSIS — M05.79 RHEUMATOID ARTHRITIS INVOLVING MULTIPLE SITES WITH POSITIVE RHEUMATOID FACTOR: ICD-10-CM

## 2023-06-12 DIAGNOSIS — D89.89 ANTISYNTHETASE SYNDROME: ICD-10-CM

## 2023-06-12 LAB
ALBUMIN SERPL-MCNC: 3.8 G/DL (ref 3.4–4.8)
ALBUMIN/GLOB SERPL: 1.2 RATIO (ref 1.1–2)
ALP SERPL-CCNC: 61 UNIT/L (ref 40–150)
ALT SERPL-CCNC: 24 UNIT/L (ref 0–55)
AST SERPL-CCNC: 21 UNIT/L (ref 5–34)
BASOPHILS # BLD AUTO: 0.06 X10(3)/MCL
BASOPHILS NFR BLD AUTO: 1.4 %
BILIRUBIN DIRECT+TOT PNL SERPL-MCNC: 0.9 MG/DL
BUN SERPL-MCNC: 14.3 MG/DL (ref 8.4–25.7)
CALCIUM SERPL-MCNC: 9.1 MG/DL (ref 8.8–10)
CHLORIDE SERPL-SCNC: 101 MMOL/L (ref 98–107)
CO2 SERPL-SCNC: 25 MMOL/L (ref 23–31)
CREAT SERPL-MCNC: 0.9 MG/DL (ref 0.73–1.18)
EOSINOPHIL # BLD AUTO: 0.45 X10(3)/MCL (ref 0–0.9)
EOSINOPHIL NFR BLD AUTO: 10.3 %
ERYTHROCYTE [DISTWIDTH] IN BLOOD BY AUTOMATED COUNT: 13.6 % (ref 11.5–17)
GFR SERPLBLD CREATININE-BSD FMLA CKD-EPI: >60 MLS/MIN/1.73/M2
GLOBULIN SER-MCNC: 3.1 GM/DL (ref 2.4–3.5)
GLUCOSE SERPL-MCNC: 95 MG/DL (ref 82–115)
HCT VFR BLD AUTO: 40.5 % (ref 42–52)
HGB BLD-MCNC: 13.4 G/DL (ref 14–18)
IMM GRANULOCYTES # BLD AUTO: 0.02 X10(3)/MCL (ref 0–0.04)
IMM GRANULOCYTES NFR BLD AUTO: 0.5 %
LYMPHOCYTES # BLD AUTO: 1.15 X10(3)/MCL (ref 0.6–4.6)
LYMPHOCYTES NFR BLD AUTO: 26.2 %
MCH RBC QN AUTO: 30.7 PG (ref 27–31)
MCHC RBC AUTO-ENTMCNC: 33.1 G/DL (ref 33–36)
MCV RBC AUTO: 92.7 FL (ref 80–94)
MONOCYTES # BLD AUTO: 0.71 X10(3)/MCL (ref 0.1–1.3)
MONOCYTES NFR BLD AUTO: 16.2 %
NEUTROPHILS # BLD AUTO: 2 X10(3)/MCL (ref 2.1–9.2)
NEUTROPHILS NFR BLD AUTO: 45.4 %
NRBC BLD AUTO-RTO: 0 %
PLATELET # BLD AUTO: 234 X10(3)/MCL (ref 130–400)
PMV BLD AUTO: 10.7 FL (ref 7.4–10.4)
POTASSIUM SERPL-SCNC: 3.8 MMOL/L (ref 3.5–5.1)
PROT SERPL-MCNC: 6.9 GM/DL (ref 5.8–7.6)
RBC # BLD AUTO: 4.37 X10(6)/MCL (ref 4.7–6.1)
SODIUM SERPL-SCNC: 136 MMOL/L (ref 136–145)
WBC # SPEC AUTO: 4.39 X10(3)/MCL (ref 4.5–11.5)

## 2023-06-12 PROCEDURE — 80053 COMPREHEN METABOLIC PANEL: CPT

## 2023-06-12 PROCEDURE — 85025 COMPLETE CBC W/AUTO DIFF WBC: CPT

## 2023-06-12 PROCEDURE — 36415 COLL VENOUS BLD VENIPUNCTURE: CPT

## 2023-06-13 DIAGNOSIS — J84.9 INTERSTITIAL LUNG DISEASE: ICD-10-CM

## 2023-06-13 DIAGNOSIS — D89.89 ANTISYNTHETASE SYNDROME: ICD-10-CM

## 2023-06-13 DIAGNOSIS — M05.79 RHEUMATOID ARTHRITIS INVOLVING MULTIPLE SITES WITH POSITIVE RHEUMATOID FACTOR: ICD-10-CM

## 2023-06-13 RX ORDER — TOCILIZUMAB 180 MG/ML
162 INJECTION, SOLUTION SUBCUTANEOUS
Qty: 4 EACH | Refills: 2 | Status: SHIPPED | OUTPATIENT
Start: 2023-06-13 | End: 2023-06-13 | Stop reason: SDUPTHER

## 2023-06-13 RX ORDER — TOCILIZUMAB 180 MG/ML
162 INJECTION, SOLUTION SUBCUTANEOUS
Qty: 4 EACH | Refills: 2 | Status: SHIPPED | OUTPATIENT
Start: 2023-06-13 | End: 2023-06-14 | Stop reason: SDUPTHER

## 2023-06-14 DIAGNOSIS — M05.79 RHEUMATOID ARTHRITIS INVOLVING MULTIPLE SITES WITH POSITIVE RHEUMATOID FACTOR: ICD-10-CM

## 2023-06-14 DIAGNOSIS — J84.9 INTERSTITIAL LUNG DISEASE: ICD-10-CM

## 2023-06-14 DIAGNOSIS — D89.89 ANTISYNTHETASE SYNDROME: ICD-10-CM

## 2023-06-14 RX ORDER — TOCILIZUMAB 180 MG/ML
162 INJECTION, SOLUTION SUBCUTANEOUS
Qty: 4 EACH | Refills: 2 | Status: SHIPPED | OUTPATIENT
Start: 2023-06-14 | End: 2023-07-20 | Stop reason: SDUPTHER

## 2023-07-20 ENCOUNTER — OFFICE VISIT (OUTPATIENT)
Dept: RHEUMATOLOGY | Facility: CLINIC | Age: 63
End: 2023-07-20
Payer: MEDICARE

## 2023-07-20 VITALS
TEMPERATURE: 98 F | HEIGHT: 74 IN | BODY MASS INDEX: 32.81 KG/M2 | DIASTOLIC BLOOD PRESSURE: 78 MMHG | RESPIRATION RATE: 18 BRPM | SYSTOLIC BLOOD PRESSURE: 114 MMHG | WEIGHT: 255.63 LBS | HEART RATE: 104 BPM | OXYGEN SATURATION: 95 %

## 2023-07-20 DIAGNOSIS — I27.20 PULMONARY HTN: ICD-10-CM

## 2023-07-20 DIAGNOSIS — E11.9 TYPE 2 DIABETES MELLITUS WITHOUT COMPLICATION, WITH LONG-TERM CURRENT USE OF INSULIN: ICD-10-CM

## 2023-07-20 DIAGNOSIS — E04.1 THYROID NODULE: ICD-10-CM

## 2023-07-20 DIAGNOSIS — M87.00 AVASCULAR NECROSIS OF BONE: ICD-10-CM

## 2023-07-20 DIAGNOSIS — M05.79 RHEUMATOID ARTHRITIS INVOLVING MULTIPLE SITES WITH POSITIVE RHEUMATOID FACTOR: ICD-10-CM

## 2023-07-20 DIAGNOSIS — Z79.4 TYPE 2 DIABETES MELLITUS WITHOUT COMPLICATION, WITH LONG-TERM CURRENT USE OF INSULIN: ICD-10-CM

## 2023-07-20 DIAGNOSIS — M17.11 PRIMARY OSTEOARTHRITIS OF RIGHT KNEE: ICD-10-CM

## 2023-07-20 DIAGNOSIS — J84.9 INTERSTITIAL LUNG DISEASE: ICD-10-CM

## 2023-07-20 DIAGNOSIS — D89.89 ANTISYNTHETASE SYNDROME: Primary | ICD-10-CM

## 2023-07-20 DIAGNOSIS — Z79.899 HIGH RISK MEDICATION USE: ICD-10-CM

## 2023-07-20 DIAGNOSIS — K50.119 CROHN'S DISEASE OF COLON WITH COMPLICATION: ICD-10-CM

## 2023-07-20 DIAGNOSIS — D70.2 OTHER DRUG-INDUCED NEUTROPENIA: ICD-10-CM

## 2023-07-20 PROCEDURE — 3074F PR MOST RECENT SYSTOLIC BLOOD PRESSURE < 130 MM HG: ICD-10-PCS | Mod: CPTII,,, | Performed by: INTERNAL MEDICINE

## 2023-07-20 PROCEDURE — 1159F PR MEDICATION LIST DOCUMENTED IN MEDICAL RECORD: ICD-10-PCS | Mod: CPTII,,, | Performed by: INTERNAL MEDICINE

## 2023-07-20 PROCEDURE — 3078F PR MOST RECENT DIASTOLIC BLOOD PRESSURE < 80 MM HG: ICD-10-PCS | Mod: CPTII,,, | Performed by: INTERNAL MEDICINE

## 2023-07-20 PROCEDURE — 99214 OFFICE O/P EST MOD 30 MIN: CPT | Mod: PBBFAC | Performed by: INTERNAL MEDICINE

## 2023-07-20 PROCEDURE — 1159F MED LIST DOCD IN RCRD: CPT | Mod: CPTII,,, | Performed by: INTERNAL MEDICINE

## 2023-07-20 PROCEDURE — 3074F SYST BP LT 130 MM HG: CPT | Mod: CPTII,,, | Performed by: INTERNAL MEDICINE

## 2023-07-20 PROCEDURE — 3008F PR BODY MASS INDEX (BMI) DOCUMENTED: ICD-10-PCS | Mod: CPTII,,, | Performed by: INTERNAL MEDICINE

## 2023-07-20 PROCEDURE — 99215 OFFICE O/P EST HI 40 MIN: CPT | Mod: S$PBB,,, | Performed by: INTERNAL MEDICINE

## 2023-07-20 PROCEDURE — 4010F ACE/ARB THERAPY RXD/TAKEN: CPT | Mod: CPTII,,, | Performed by: INTERNAL MEDICINE

## 2023-07-20 PROCEDURE — 99215 PR OFFICE/OUTPT VISIT, EST, LEVL V, 40-54 MIN: ICD-10-PCS | Mod: S$PBB,,, | Performed by: INTERNAL MEDICINE

## 2023-07-20 PROCEDURE — 3078F DIAST BP <80 MM HG: CPT | Mod: CPTII,,, | Performed by: INTERNAL MEDICINE

## 2023-07-20 PROCEDURE — 4010F PR ACE/ARB THEARPY RXD/TAKEN: ICD-10-PCS | Mod: CPTII,,, | Performed by: INTERNAL MEDICINE

## 2023-07-20 PROCEDURE — 3008F BODY MASS INDEX DOCD: CPT | Mod: CPTII,,, | Performed by: INTERNAL MEDICINE

## 2023-07-20 RX ORDER — TOCILIZUMAB 180 MG/ML
162 INJECTION, SOLUTION SUBCUTANEOUS
Qty: 4 EACH | Refills: 6 | Status: SHIPPED | OUTPATIENT
Start: 2023-07-20 | End: 2023-08-17

## 2023-07-20 NOTE — PROGRESS NOTES
Patient ID: [id number]     Chief Complaint: Follow-up (Pt states he is feeling well today. He states he does have a lot of back pain today. )    HPI:     Vic Meng is a 63 y.o. male here today for follow up of ILD, RA and antisynthetase overlap.    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 He was first seen by rheum in March 2021 and had been on prednisone 60mg/d.  He was started on steroid taper, Ofev and later Actemra. Right thyroid nodule FNA suspicious for follicular neoplasm s/p R thyroid lobectomy on 1/14/22. Path consistent with benign follicular adenoma. After diagnosis his Actemra was discontinued secondary to thyroid nodule and he was started on Rituximab. Received one dose of Rituximab 1000 mg on 3/2/22 and on 3/16/22. No issues with it.     No symptoms of acid reflux, heart burn, no episodes of choking. He stopped taking PPI.   He is on DASH diet and lost more than 100 lbs and he feels better, ankle pain and feet pain is better and he is able to move better now. He used to be in wheelchair and later walker, now using cane. Still has back and hip pain off and on, worse when he sits for extended periods.    He is no longer using his oxygen at home at this time due to costs, states his insurance is not covering but does feel that he could use at times. He has appointment to see pul on 8/29/23.  Ofev was stopped as this may have caused symptoms of colitis- discussed with [facility] (Dr. Tarango and TRACY Ashby) and agreed trial to hold meds- they ordered Budesonide PRN. Feeling better, diarrhea resolved.   Admits intermittent pain in hips and back. Can not sleep in bed because of back pain. He is sleeping in recliner for that.     Denies history of fevers, rashes, photosensitivity, oral or nasal ulcers, h/o MI, stroke, seizures, h/o PE or DVT,  Raynaud's phenomenon, uveitis.   Family history of autoimmune disease: none.   Smoking: half a pack in the past, smoking 10 cig per day.  40+ pack years- working on quitting completely     Social History     Tobacco Use   Smoking Status Every Day    Packs/day: 0.25    Types: Cigarettes   Smokeless Tobacco Never   Tobacco Comments    Only about 2 cigarettes a day           ----------------------------  ADELA positive  Antisynthetase syndrome  Diabetes mellitus  GERD (gastroesophageal reflux disease)  Herpes zoster  Hypertension  ILD (interstitial lung disease)  Lymphadenopathy  Morbid obesity  Osteoarthritis of right knee  Rheumatoid arthritis  Snoring  Supraclavicular mass  Thyroid nodule  Venous insufficiency of lower extremity     Past Surgical History:   Procedure Laterality Date    Drainage of right thyroid gland lobe  10/07/2021    Laser surgery left leg Left     Right hand surgery         Review of patient's allergies indicates:  No Known Allergies    Outpatient Medications Marked as Taking for the 7/20/23 encounter (Office Visit) with Emeli Pizano MD   Medication Sig Dispense Refill    ADVAIR HFA 45-21 mcg/actuation HFAA inhaler Inhale 2 puffs into the lungs 2 (two) times daily.      alcohol antiseptic pads (ALCOHOL SWABS TOP)   Alcohol swabs, See Instructions, Alcohol swabs to clean prior to insulin injection 4 times a day E 11.65, # 120 EA, 11 Refill(s), Pharmacy: [facility], 179, cm, Height/Length Dosing, 11/23/21 7:40:00 CST, 138, kg, Weight Dosing, 11/23/21 7...      aspirin (ECOTRIN) 81 MG EC tablet Take 81 mg by mouth once daily.      BD ALCOHOL SWABS PadM Apply topically.      budesonide (ENTOCORT EC) 3 mg capsule Take 9 mg by mouth daily as needed.      diclofenac (VOLTAREN) 75 MG EC tablet Take 75 mg by mouth daily as needed.      fluticasone propionate (FLONASE) 50 mcg/actuation nasal spray 1 spray by Each Nostril route 2 (two) times daily.      KRISTALOSE 20 gram Pack Take 20 g by  mouth daily as needed.      lisinopriL-hydrochlorothiazide (PRINZIDE,ZESTORETIC) 20-25 mg Tab Take 1 tablet by mouth once daily.      multivit-min/FA/lycopen/lutein (CENTRUM SILVER MEN ORAL) Take by mouth once daily.      naproxen (NAPROSYN) 500 MG tablet Take 500 mg by mouth 2 (two) times daily.      SHARPSAFETY CONTAINER Misc use as directed      TRUE METRIX GO GLUCOSE METER Misc AS DIRECTED      WALKER MISC   walker, See Instructions, osteoarthritis of hips problems ambulating, # 1 EA, 0 Refill(s)      XIIDRA 5 % Dpet Place 1 drop into both eyes 2 (two) times daily.      [DISCONTINUED] tocilizumab (ACTEMRA ACTPEN) 162 mg/0.9 mL PnIj Inject 162 mg into the skin every 7 days. 4 each 2       Social History     Socioeconomic History    Marital status: Single   Tobacco Use    Smoking status: Every Day     Packs/day: 0.25     Types: Cigarettes    Smokeless tobacco: Never    Tobacco comments:     Only about 2 cigarettes a day    Substance and Sexual Activity    Alcohol use: Yes     Comment: One beer a month    Drug use: Yes     Types: Marijuana     Comment: Occasionally        Family History   Problem Relation Age of Onset    Heart disease Mother     Breast cancer Mother     Heart attack Mother           There is no immunization history on file for this patient.    Patient Care Team:  Sarabjit Arreaga MD as PCP - General (Internal Medicine)     Subjective:     ROS    Constitutional:  Denies chills. Denies fever. Denies night sweats. Denies weight loss.   Ophthalmology: Denies blurred vision. Admits  dry eyes. Denies eye pain. Denies Itching and redness.   ENT: Denies oral ulcers. Denies epistaxis. Admits dry mouth. Denies swollen glands.   Endocrine: Admits diabetes. Admits thyroid Problems, history of cancer.  Respiratory: Admits cough. Denies shortness of breath. Admits shortness of breath with exertion. Denies hemoptysis.   Cardiovascular: Denies chest pain at rest. Denies chest pain with exertion. Denies  "palpitations.    Gastrointestinal: Denies abdominal pain. Admits to constipation. Denies diarrhea. Denies nausea. Denies vomiting. Denies hematemesis or hematochezia. Denies heartburn.  Genitourinary: Denies blood in urine.  Musculoskeletal: See HPI for details  Integumentary: Denies rash. Denies photosensitivity.   Peripheral Vascular: Denies Ulcers of hands and/or feet. Denies Cold extremities.   Neurologic: Denies dizziness. Denies headache.  Denies loss of strength. Denies numbness or tingling.   Psychiatric: Denies depression. Denies anxiety. Denies suicidal/homicidal ideations.      Objective:     /78 (BP Location: Left arm, Patient Position: Sitting, BP Method: Large (Automatic))   Pulse 104   Temp 98.3 °F (36.8 °C) (Oral)   Resp 18   Ht 6' 2" (1.88 m)   Wt 115.9 kg (255 lb 9.6 oz)   SpO2 95%   BMI 32.82 kg/m²     Physical Exam    General Appearance: alert, pleasant, in no acute distress.  Skin: Skin color, texture, turgor normal. No rashes or lesions.  Eyes:  extraocular movement intact (EOMI), pupils equal, round, reactive to light and accommodation, conjunctiva clear.  ENT: No oral or nasal ulcers.  Neck:  Neck supple. No adenopathy.   Lungs: CTA throughout without crackles, rhonchi, or wheezes. Mild clubbing present.   Heart: RRR w/o murmurs.  No edema.   Abdomen: Soft, non-tender, no masses, rebound or guarding.  Neuro: Alert, oriented, CN II-XII GI, sensory and motor innervation intact.  Musculoskeletal: Flexion deformity of right second and fourth PIP, Degenerative changes to bilateral hands. No active synovitis noted. History of synovitis in MCP's, right wrist and left ankle.   Psych: Alert, oriented, normal eye contact.    Labs:   11/10/2020: ADELA 1:2560 Speckled.   4/20/2021: TPO negative.   3/12/2021: C3/C4 ok, Aldolase ok. CRP 1.10 (elevated). TSH 0.2289 (l), TPO Ab <0.9, Thyroglobulin 46.8 (h), Sed 16 (<15), dsDNA <1:10, RNP Ab IgG 71 (moderately positive). RF IgA 13, RF IgG 10, RF " IgM 52.   2022- Infection panel (hep B/C/Quantiferon) negative.   23:  Hemoglobin 10.5.  ESR 84, normal less than 15.  Potassium slightly low 3.4.  CRP 8.2, normal less than 5.  LFTs normal.  C3, C4 okay.  CPK normal.  Hepatitis-B, hepatitis-C, HIV, quantiferon tb negative. Trace protein and no blood.  mg/G. Aldolase normal  3/2023: CBC and CMP ok. ESR and CRP ok. CPK and aldolase ok.   2023: CBC WBC 4.39 (previous 5.8) ANC 2 (previous 3.61) hgb 13.4, hct 40.5, otherwise ok. CMP ok.     Imagin2021 CTA Chest NonCoronary: Suboptimal and limited study without gross evidence of pulmonary embolus. Bilateral progressed pulmonary changes reflective of congestive process without exclusion of associated atypical infection related to Covid 19. Bilateral dependent small amount of pleural effusions  3/31/2021 HR CT Thorax W/o Contrast:  Bilateral subpleural reticular densities with mild lower lung bronchiectasis and possible early honeycombing. Pattern of changes is probable for UIP.  23: CT chest: slight progression of the bilateral ground-glass interstitial lung changes with associated bronchiectasis.  Findings have a basilar predominance. Mild areas of developing fibrotic changes are seen in the right middle lobe in the periphery.  This was seen on the prior examination as well.  The upper lobes are relatively spared. There is some subcentimeter lymphadenopathy in the axillary regions bilaterally as well as the mediastinum, unchanged.    2020 PFT: FEV1 39%; FVC  38%; TLC  52%; DLCO  33%.  23: PFT's: FVC 48%, FEV1 45%, ratio 72, 93%, TLC 63%, %, DLCO 22%, DLCO/VA 55%.     CT ab/pelvis: 22: No acute abnormality of the abdomen and pelvis. 2. Changes consistent with chronic interstitial fibrosis in the bilateral lung bases.    2021 Xray Left Hip: Sclerotic changes identified in relation to the femoral head changes of avascular necrosis cannot be completely excluded and  therefore other imaging modalities might prove helpful for further assessment. Degenerative changes.   Xray Right Hip: Sclerotic changes identified in relation to the femoral head changes of avascular necrosis cannot be completely excluded and therefore other imaging modalities might prove helpful for further assessment.  Degenerative changes.    Echo from 11/20 shows LVEF 50-55% w/ mild tricuspid regurg and RVSP 63 mmHg.   1/2021: Pulmonary hypertension RVSP 63    10/5/2021: ECHO: LV EF 50%  Echo 2/1/23: The left ventricle is normal in size with low normal systolic function. Mild right ventricular enlargement with low normal right ventricular systolic function. The estimated ejection fraction is 53%. Grade I left ventricular diastolic dysfunction. PASP 37 mgHg.     01/08/2021  XR Esophagram:  Small sliding hiatal hernia with intermittent reflux.     3/8/22 Chest Xray:  Prominent interstitial markings with subsegmental atelectatic changes of the bilateral bases.   10/6/2020 Right Knee Xray: Degenerative changes most pronounced in the medial joint compartment.       Assessment:       ICD-10-CM ICD-9-CM   1. Antisynthetase syndrome  D89.89 279.49   2. Rheumatoid arthritis involving multiple sites with positive rheumatoid factor  M05.79 714.0   3. Interstitial lung disease  J84.9 515   4. High risk medication use  Z79.899 V58.69   5. Crohn's disease of colon with complication  K50.119 555.1   6. Type 2 diabetes mellitus without complication, with long-term current use of insulin  E11.9 250.00    Z79.4 V58.67   7. Pulmonary HTN  I27.20 416.8   8. Avascular necrosis of bone  M87.00 733.40   9. Thyroid nodule  E04.1 241.0   10. Primary osteoarthritis of right knee  M17.11 715.16   11. Other drug-induced neutropenia  D70.2 288.03          Plan:     1. Antisynthetase syndrome: ADELA 1:2560 cytoplasmic / speckled and +PL-12 on myositis panel w/ adv ILD. Hx of oral ulcers, LAD on imaging, and inflammatory joint pain). Positive  RNP, MCTD and myositis overlap. Using a walker today for hip and back pain, avascular necrosis of hips, muscle strength is ok. Normal CK, aldolase.  - Received one dose of Rituximab 1000 mg on 3/2/22 and on 3/16/22.   - Advanced ILD, UIP pattern. 2/1/23: CT chest: slight progression of the bilateral ground-glass interstitial lung changes with associated bronchiectasis.  Findings have a basilar predominance. Mild areas of developing fibrotic changes are seen in the right middle lobe in the periphery.  This was seen on the prior examination as well.  Subcentimeter lymphadenopathy in the axillary regions bilaterally as well as the mediastinum, unchanged.  -2/2023 ECHO EF 53%. Grade I left ventricular diastolic dysfunction. PASP 37 mgHg.   -2/1/23: PFT's: FVC 48%, FEV1 45%, ratio 72, 93%, TLC 63%, %, DLCO 22%, DLCO/VA 55%.   - Can consider restarting OFEV in the future if needed at a low dose.  -Re-started Actemra 162 mg/0.9 ml subq every 7 days in 2/2023. Tolerating it well with out issues.  - Labs today. Check PFT's in next visit.      2. Rheumatoid arthritis involving multiple sites with positive rheumatoid factor:Strongly + CCP and RF.  Previously on Actemra. No active synovitis on exam. Pain in hips secondary to OA and avascular necrosis.   - He was on Actemra in the past- tolerated well- was d/c due to concern for possible thyroid ca however bx benign. Received one dose of Rituximab 1000 mg on 3/2/22 and on 3/16/22.   - Re-started Actemra 162 mg subq every 7 days as mentioned above.     3. Interstitial lung disease: Severe ILD secondary to RA, overlap w/ antisynthetase syndrome, MCTD. PFT show severe restriction.   - Initially in March 2021, he had been on prednisone 60mg/d for more than a year in the past, off of steroids now. Actemra d/c 2/2 questionable thyroid malignancy, path came back as benign.   - Not on home O2 due to cost issues. Referral sent to pulm at last OV.  - Received one dose of Rituximab  1000 mg on 3/2/22 and on 3/16/22. No issues with it.   -Continue to hold OFEV as previously directed- possibly associated with colitis- GI treating with steroids at this time.    -Advanced ILD, UIP pattern. 2/1/23: CT chest: slight progression of the bilateral ground-glass interstitial lung changes and PFT's showed slight worsening in DLCO.  - Discussed smoking cessation, he is working on it, provided information for smoking cessation. Started him on Actemra.     4. High risk medication use   -Advised to stay up-to-date on age appropriate vaccinations and malignancy screening with PCP.   -Persons with rheumatoid arthritis, lupus, psoriatic arthritis and other autoimmune diseases are at increased risk of cardiovascular disease including heart attack and stroke. -We recommend that all patients with these conditions have annual health maintenance exams including lipid measurements, blood pressure measurements, and smoking cessation counseling when applicable at their primary care provider's office.  -Continued lab monitoring.      5. Crohn's disease of colon per patient, requested records form [facility]. Was hospitalized for abdominal pain in 9/2022 at [facility], reviewed records, he was seen by Dr Tarango. CT abdo/pelvis normal on 9/27/22. Colonoscopy on 9/30/22 showed mod erythema and scattered ulcerations on transverse colon, severe ulcers in ascending colon, inflammation of cecum, biopsy taken, suspicious for crohn's. Biopsy showed active ulcer, reactive changes, no malignancy, mild crypt architectural disarray, dd include infection vs ILD.   -Did discuss with ySmone MOLINA with Dr. Tarango- possibly caused by OFEV- patient currently holding OFEV and on Budesonide PRN with GI. Diarrhea resolved.      6. Hypertension, T2DM:   -Followed by PCP. He had seen endo in the past.      7. Osteoarthritis of right knee: Recommend stretches and exercises, tylenol as needed. In WC because of arthritis.     8.  Right thyroid nodule FNA suspicious for follicular neoplasm s/p R thyroid lobectomy on 1/14/22. Path consistent with benign follicular adenoma. Had seen ENT and was discharged after the pathology results. No other nodules noted (per ENT notes 2/16/22) in intra-op or on UA. Benign lesion.     9. Pulmonary HTN: RVSP 41 mmHg (down from 63). Mild-to-Mod tricuspid regurg. Previously planned referral to [place] for pulm HTN eval, but pt reports he can not get there.   - 2/2023 ECHO EF 53%. Grade I left ventricular diastolic dysfunction. PASP 37 mgHg.      10. Avascular necrosis of the Hips. 2/2 to chronic high dose steroid use. Continues to have hip pain. Continue follow up with ortho. Planning to see pain management.     11.  Neutropenia: mild could be form Actemra, check CBC today.          Follow up in about 3 months (around 10/20/2023). In addition to their scheduled follow up, the patient has also been instructed to follow up on as needed basis.      Total time spent with patient and documentation is more than 40 minutes. All questions were answered to patient's satisfaction and patient verbalized understanding.

## 2023-08-30 ENCOUNTER — TELEPHONE (OUTPATIENT)
Dept: PULMONOLOGY | Facility: CLINIC | Age: 63
End: 2023-08-30
Payer: MEDICARE

## 2023-08-30 NOTE — TELEPHONE ENCOUNTER
Patient came to clinic on 08/29/2023 for his scheduled Pulmonology appt. Due to his insurance, he was not able to be seen in clinic for his appointment.    Called and spoke with patient. He stated he called [employer] this morning and has the PPO plan (which is accepted). Understanding voiced. Offered to provide the number to [facility] to see if he would be able to be seen sooner over at that office. Patient stated that he would like to be seen here at [facility]. Understanding voiced. Patient appreciative of the call.

## 2023-10-11 ENCOUNTER — TELEPHONE (OUTPATIENT)
Dept: RHEUMATOLOGY | Facility: CLINIC | Age: 63
End: 2023-10-11
Payer: MEDICARE

## 2023-10-11 NOTE — TELEPHONE ENCOUNTER
----- Message from Tamara Laurent sent at 10/11/2023 11:16 AM CDT -----  Demi with [facility] called bc pt states that ACTEMRA  is not working for him and he is still in pain. Please advise  [phone]

## 2023-10-11 NOTE — TELEPHONE ENCOUNTER
Called patient and asked how long and he stated that it has been going on for a while. He report that he is still having back, hip, and bilateral shoulders pain and is not able to sleep in his bed due to pain. He also stated that his PCP prescribed medication for pain and is ineffective. Please advise.

## 2023-10-12 NOTE — TELEPHONE ENCOUNTER
"This nurse spoke with Dr. Pizano about complaints of pain. She stated to follow up with PCP for pain medication. Also If patient is interested this clinic can send referral for physical therapy.     Then called and spoke patient and was instructed to follow up with PCP Dr. Arreaga. He is not interested in PT as it has not helped him in the past. Patient has considered pain management but does not want to "get hooked" on pain medication.     "

## 2023-10-12 NOTE — TELEPHONE ENCOUNTER
Called patient and left voicemail message to call clinic back. Attempting to notify patient that he can be scheduled next available to discuss pain.

## 2023-10-13 NOTE — TELEPHONE ENCOUNTER
Called and spoke with patient to notify that this clinic will attempt to get him a sooner appointment. Verbalized understanding.

## 2023-11-09 ENCOUNTER — OFFICE VISIT (OUTPATIENT)
Dept: RHEUMATOLOGY | Facility: CLINIC | Age: 63
End: 2023-11-09
Payer: MEDICARE

## 2023-11-09 VITALS
TEMPERATURE: 98 F | HEART RATE: 85 BPM | RESPIRATION RATE: 18 BRPM | BODY MASS INDEX: 35.89 KG/M2 | SYSTOLIC BLOOD PRESSURE: 115 MMHG | HEIGHT: 74 IN | DIASTOLIC BLOOD PRESSURE: 80 MMHG | OXYGEN SATURATION: 95 % | WEIGHT: 279.63 LBS

## 2023-11-09 DIAGNOSIS — E04.1 THYROID NODULE: ICD-10-CM

## 2023-11-09 DIAGNOSIS — G89.29 CHRONIC BILATERAL LOW BACK PAIN WITH LEFT-SIDED SCIATICA: ICD-10-CM

## 2023-11-09 DIAGNOSIS — M17.11 PRIMARY OSTEOARTHRITIS OF RIGHT KNEE: ICD-10-CM

## 2023-11-09 DIAGNOSIS — Z79.899 HIGH RISK MEDICATION USE: ICD-10-CM

## 2023-11-09 DIAGNOSIS — Z79.4 TYPE 2 DIABETES MELLITUS WITHOUT COMPLICATION, WITH LONG-TERM CURRENT USE OF INSULIN: ICD-10-CM

## 2023-11-09 DIAGNOSIS — D89.89 ANTISYNTHETASE SYNDROME: Primary | ICD-10-CM

## 2023-11-09 DIAGNOSIS — K52.9 IBD (INFLAMMATORY BOWEL DISEASE): ICD-10-CM

## 2023-11-09 DIAGNOSIS — Z23 FLU VACCINE NEED: ICD-10-CM

## 2023-11-09 DIAGNOSIS — E11.9 TYPE 2 DIABETES MELLITUS WITHOUT COMPLICATION, WITH LONG-TERM CURRENT USE OF INSULIN: ICD-10-CM

## 2023-11-09 DIAGNOSIS — Z23 NEEDS FLU SHOT: ICD-10-CM

## 2023-11-09 DIAGNOSIS — Z72.0 TOBACCO USE: ICD-10-CM

## 2023-11-09 DIAGNOSIS — M25.512 CHRONIC PAIN OF BOTH SHOULDERS: ICD-10-CM

## 2023-11-09 DIAGNOSIS — I27.20 PULMONARY HTN: ICD-10-CM

## 2023-11-09 DIAGNOSIS — D70.2 OTHER DRUG-INDUCED NEUTROPENIA: ICD-10-CM

## 2023-11-09 DIAGNOSIS — M54.42 CHRONIC BILATERAL LOW BACK PAIN WITH LEFT-SIDED SCIATICA: ICD-10-CM

## 2023-11-09 DIAGNOSIS — J84.9 INTERSTITIAL LUNG DISEASE: ICD-10-CM

## 2023-11-09 DIAGNOSIS — M87.00 AVASCULAR NECROSIS OF BONE: ICD-10-CM

## 2023-11-09 DIAGNOSIS — M05.79 RHEUMATOID ARTHRITIS INVOLVING MULTIPLE SITES WITH POSITIVE RHEUMATOID FACTOR: ICD-10-CM

## 2023-11-09 DIAGNOSIS — M25.511 CHRONIC PAIN OF BOTH SHOULDERS: ICD-10-CM

## 2023-11-09 DIAGNOSIS — G89.29 CHRONIC PAIN OF BOTH SHOULDERS: ICD-10-CM

## 2023-11-09 PROCEDURE — 4010F PR ACE/ARB THEARPY RXD/TAKEN: ICD-10-PCS | Mod: CPTII,,, | Performed by: INTERNAL MEDICINE

## 2023-11-09 PROCEDURE — 3079F DIAST BP 80-89 MM HG: CPT | Mod: CPTII,,, | Performed by: INTERNAL MEDICINE

## 2023-11-09 PROCEDURE — 99215 PR OFFICE/OUTPT VISIT, EST, LEVL V, 40-54 MIN: ICD-10-PCS | Mod: S$PBB,,, | Performed by: INTERNAL MEDICINE

## 2023-11-09 PROCEDURE — G0008 ADMIN INFLUENZA VIRUS VAC: HCPCS | Mod: PBBFAC

## 2023-11-09 PROCEDURE — 3008F BODY MASS INDEX DOCD: CPT | Mod: CPTII,,, | Performed by: INTERNAL MEDICINE

## 2023-11-09 PROCEDURE — 99215 OFFICE O/P EST HI 40 MIN: CPT | Mod: PBBFAC | Performed by: INTERNAL MEDICINE

## 2023-11-09 PROCEDURE — 3008F PR BODY MASS INDEX (BMI) DOCUMENTED: ICD-10-PCS | Mod: CPTII,,, | Performed by: INTERNAL MEDICINE

## 2023-11-09 PROCEDURE — 1159F MED LIST DOCD IN RCRD: CPT | Mod: CPTII,,, | Performed by: INTERNAL MEDICINE

## 2023-11-09 PROCEDURE — 3074F SYST BP LT 130 MM HG: CPT | Mod: CPTII,,, | Performed by: INTERNAL MEDICINE

## 2023-11-09 PROCEDURE — 3074F PR MOST RECENT SYSTOLIC BLOOD PRESSURE < 130 MM HG: ICD-10-PCS | Mod: CPTII,,, | Performed by: INTERNAL MEDICINE

## 2023-11-09 PROCEDURE — 99215 OFFICE O/P EST HI 40 MIN: CPT | Mod: S$PBB,,, | Performed by: INTERNAL MEDICINE

## 2023-11-09 PROCEDURE — 1159F PR MEDICATION LIST DOCUMENTED IN MEDICAL RECORD: ICD-10-PCS | Mod: CPTII,,, | Performed by: INTERNAL MEDICINE

## 2023-11-09 PROCEDURE — 3079F PR MOST RECENT DIASTOLIC BLOOD PRESSURE 80-89 MM HG: ICD-10-PCS | Mod: CPTII,,, | Performed by: INTERNAL MEDICINE

## 2023-11-09 PROCEDURE — 4010F ACE/ARB THERAPY RXD/TAKEN: CPT | Mod: CPTII,,, | Performed by: INTERNAL MEDICINE

## 2023-11-09 RX ORDER — FUROSEMIDE 20 MG/1
20 TABLET ORAL DAILY
COMMUNITY
Start: 2023-11-01

## 2023-11-09 RX ORDER — GABAPENTIN 300 MG/1
300 CAPSULE ORAL 3 TIMES DAILY
COMMUNITY
Start: 2023-09-18

## 2023-11-09 RX ORDER — TOCILIZUMAB 180 MG/ML
INJECTION, SOLUTION SUBCUTANEOUS
COMMUNITY
Start: 2023-10-11 | End: 2023-11-09 | Stop reason: SDUPTHER

## 2023-11-09 RX ORDER — CYCLOSPORINE 0.5 MG/ML
1 EMULSION OPHTHALMIC 2 TIMES DAILY
COMMUNITY
Start: 2023-09-25

## 2023-11-09 RX ORDER — LEVOCETIRIZINE DIHYDROCHLORIDE 5 MG/1
5 TABLET, FILM COATED ORAL
COMMUNITY
Start: 2023-08-21

## 2023-11-09 RX ORDER — OMEPRAZOLE 40 MG/1
40 CAPSULE, DELAYED RELEASE ORAL
COMMUNITY
Start: 2023-10-11

## 2023-11-09 RX ORDER — TOCILIZUMAB 180 MG/ML
INJECTION, SOLUTION SUBCUTANEOUS
Qty: 4 EACH | Refills: 6 | Status: SHIPPED | OUTPATIENT
Start: 2023-11-09

## 2023-11-09 RX ORDER — PREDNISONE 10 MG/1
TABLET ORAL
COMMUNITY
Start: 2023-06-27 | End: 2023-11-09

## 2023-11-09 RX ORDER — TRAMADOL HYDROCHLORIDE 50 MG/1
50 TABLET ORAL EVERY 12 HOURS PRN
COMMUNITY
Start: 2023-11-06

## 2023-11-09 NOTE — PROGRESS NOTES
Patient ID: [id number]     Chief Complaint: Antisynthetase syndrome (Pt here for follow up visit Antisynthetase syndrome generalized joint pain.  Pt states he having a lot of difficulty resting at night from this. )    HPI:     Vic Meng is a 63 y.o. male here today for follow up of ILD, RA and antisynthetase overlap.    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 He was first seen by rheum in March 2021 and had been on prednisone 60mg/d.  He was started on steroid taper, Ofev and later Actemra. Right thyroid nodule FNA suspicious for follicular neoplasm s/p R thyroid lobectomy on 1/14/22. Path consistent with benign follicular adenoma. After diagnosis his Actemra was discontinued secondary to thyroid nodule and he was started on Rituximab. Received one dose of Rituximab 1000 mg on 3/2/22 and on 3/16/22. No issues with it. Restarted Actemra as thyroid nodule was benign.    Currently taking Actemra 162 mg SQ once a week with out any issues. Denies red, warm and swollen joints. Denies joint pain in bilateral hands or wrist or elbows.  No symptoms of acid reflux, heart burn, no episodes of choking. He stopped taking PPI.   He was on DASH diet and lost more than 100 lbs. He used to be in wheelchair and later walker, now using cane. He is not strict with diet now, gaining weight again.  He is no longer using his oxygen at home at this time due to costs, states his insurance is not covering but does feel that he could use at times. He follows with pulmonary, advised to discuss with pulmonary regarding issues with oxygen.   Complaining of pain in lower back which radiates to left leg sometimes.  He has chronic back pain for more than 3-4 years.  Back pain worse when he lies on his back, can not sleep on the bed because of pain.  Also complaining of bilateral shoulder pain, he has been sleeping in recliner for the  last 1 year because of back and shoulder pain.  Did physical therapy for back in the past but it did not help, also he did not tried for long enough.  Currently not doing any exercises.  Most of the time he is sitting in the recliner. He also had injections to his back, and it did not help much.  He has severe arthritis in bilateral hips, he was told he needs hip replacement, he do not want any surgery.  His PCP started him on tramadol, it was not helping with back pain.  After prolonged discussion is willing to try physical therapy again.  Ofev was stopped as this may have caused symptoms of colitis- discussed with [facility] (Dr. Tarango and TRACY Ashby) and agreed trial to hold meds- they ordered Budesonide PRN. Feeling better, diarrhea resolved.     Denies history of fevers, rashes, photosensitivity, oral or nasal ulcers, h/o MI, stroke, seizures, h/o PE or DVT, Raynaud's phenomenon, uveitis.   Family history of autoimmune disease: none.   Smoking: half a pack in the past, smoking 10 cig per day.  40+ pack years- working on quitting completely     Social History     Tobacco Use   Smoking Status Every Day    Current packs/day: 0.25    Types: Cigarettes   Smokeless Tobacco Never   Tobacco Comments    Only about 2 cigarettes a day           ----------------------------  ADELA positive  Antisynthetase syndrome  Diabetes mellitus  GERD (gastroesophageal reflux disease)  Herpes zoster  Hypertension  ILD (interstitial lung disease)  Lymphadenopathy  Morbid obesity  Osteoarthritis of right knee  Rheumatoid arthritis  Snoring  Supraclavicular mass  Thyroid nodule  Venous insufficiency of lower extremity     Past Surgical History:   Procedure Laterality Date    Drainage of right thyroid gland lobe  10/07/2021    Laser surgery left leg Left     Right hand surgery         Review of patient's allergies indicates:  No Known Allergies    Outpatient Medications Marked as Taking for the 11/9/23 encounter (Office Visit) with  Emeli Pizano MD   Medication Sig Dispense Refill    ADVAIR HFA 45-21 mcg/actuation HFAA inhaler Inhale 2 puffs into the lungs once daily.      alcohol antiseptic pads (ALCOHOL SWABS TOP)   Alcohol swabs, See Instructions, Alcohol swabs to clean prior to insulin injection 4 times a day E 11.65, # 120 EA, 11 Refill(s), Pharmacy: [facility], 179, cm, Height/Length Dosing, 21 7:40:00 CST, 138, kg, Weight Dosing, 21 7...      aspirin (ECOTRIN) 81 MG EC tablet Take 81 mg by mouth once daily.      BD ALCOHOL SWABS PadM Apply topically.      diclofenac (VOLTAREN) 75 MG EC tablet Take 75 mg by mouth daily as needed.      fluticasone propionate (FLONASE) 50 mcg/actuation nasal spray 1 spray by Each Nostril route 2 (two) times daily.      furosemide (LASIX) 20 MG tablet Take 20 mg by mouth Daily.      gabapentin (NEURONTIN) 300 MG capsule Take 300 mg by mouth 3 (three) times daily.      KRISTALOSE 20 gram Pack Take 20 g by mouth daily as needed.      levocetirizine (XYZAL) 5 MG tablet Take 5 mg by mouth.      lisinopriL-hydrochlorothiazide (PRINZIDE,ZESTORETIC) 20-25 mg Tab Take 1 tablet by mouth once daily.      multivit-min/FA/lycopen/lutein (CENTRUM SILVER MEN ORAL) Take by mouth once daily.      omeprazole (PRILOSEC) 40 MG capsule Take 40 mg by mouth.      RESTASIS 0.05 % ophthalmic emulsion Place 1 drop into both eyes 2 (two) times daily.      SHARPSAFETY CONTAINER Misc use as directed      traMADoL (ULTRAM) 50 mg tablet Take 50 mg by mouth every 12 (twelve) hours as needed.      TRUE METRIX GO GLUCOSE METER Misc AS DIRECTED      WALKER MISC   walker, See Instructions, osteoarthritis of hips problems ambulating, # 1 EA, 0 Refill(s)      XIIDRA 5 % Dpet Place 1 drop into both eyes 2 (two) times daily.      [DISCONTINUED] ACTEMRA ACTPEN 162 mg/0.9 mL PnIj SMARTSI Milligram(s) SUB-Q Once a Week      [DISCONTINUED] budesonide (ENTOCORT EC) 3 mg capsule Take 9 mg by mouth daily as needed.       [DISCONTINUED] naproxen (NAPROSYN) 500 MG tablet Take 500 mg by mouth 2 (two) times daily as needed.      [DISCONTINUED] predniSONE (DELTASONE) 10 MG tablet Take by mouth.         Social History     Socioeconomic History    Marital status: Single   Tobacco Use    Smoking status: Every Day     Current packs/day: 0.25     Types: Cigarettes    Smokeless tobacco: Never    Tobacco comments:     Only about 2 cigarettes a day    Substance and Sexual Activity    Alcohol use: Yes     Comment: One beer a month    Drug use: Yes     Types: Marijuana     Comment: Occasionally        Family History   Problem Relation Age of Onset    Heart disease Mother     Breast cancer Mother     Heart attack Mother         Immunization History   Administered Date(s) Administered    COVID-19, MRNA, LN-S, PF (MODERNA FULL 0.5 ML DOSE) 03/08/2021, 04/06/2021    Influenza (FLUAD) - Quadrivalent - Adjuvanted - PF *Preferred* (65+) 11/09/2023    Influenza - Quadrivalent - PF *Preferred* (6 months and older) 12/22/2022    Influenza - Trivalent - PF (ADULT) 11/11/2020    Pneumococcal Conjugate - 13 Valent 11/18/2021    Tdap 04/10/2019       Patient Care Team:  Sarabjit Arreaga MD as PCP - General (Internal Medicine)     Subjective:     ROS    Constitutional:  Denies chills. Denies fever. Denies night sweats. Denies weight loss.   Ophthalmology: Denies blurred vision. Admits  dry eyes. Denies eye pain. Denies Itching and redness.   ENT: Denies oral ulcers. Denies epistaxis. Admits dry mouth. Denies swollen glands.   Endocrine: Admits diabetes. Admits thyroid Problems, history of cancer.  Respiratory: Admits cough. Denies shortness of breath. Admits shortness of breath with exertion. Denies hemoptysis.   Cardiovascular: Denies chest pain at rest. Denies chest pain with exertion. Denies palpitations.    Gastrointestinal: Denies abdominal pain. Admits to constipation. Denies diarrhea. Denies nausea. Denies vomiting. Denies hematemesis or hematochezia.  "Denies heartburn.  Genitourinary: Denies blood in urine.  Musculoskeletal: See HPI for details  Integumentary: Denies rash. Denies photosensitivity.   Peripheral Vascular: Denies Ulcers of hands and/or feet. Denies Cold extremities.   Neurologic: Denies dizziness. Denies headache.  Denies loss of strength. Denies numbness or tingling.   Psychiatric: Denies depression. Denies anxiety. Denies suicidal/homicidal ideations.      Objective:     /80 (BP Location: Left arm, Patient Position: Sitting, BP Method: Large (Automatic))   Pulse 85   Temp 98 °F (36.7 °C) (Oral)   Resp 18   Ht 6' 2" (1.88 m)   Wt 126.8 kg (279 lb 9.6 oz)   SpO2 95%   BMI 35.90 kg/m²     Physical Exam    General Appearance: alert, pleasant, in no acute distress.  Skin: Skin color, texture, turgor normal. No rashes or lesions.  Eyes:  extraocular movement intact (EOMI), pupils equal, round, reactive to light and accommodation, conjunctiva clear.  ENT: No oral or nasal ulcers.  Neck:  Neck supple. No adenopathy.   Lungs: CTA throughout without crackles, rhonchi, or wheezes. Mild clubbing present.   Heart: RRR w/o murmurs.  No edema.   Abdomen: Soft, non-tender, no masses, rebound or guarding.  Neuro: Alert, oriented, CN II-XII GI, sensory and motor innervation intact.  Musculoskeletal: Flexion deformity of right second and fourth PIP, Degenerative changes to bilateral hands. No active synovitis noted. Tenderness with ROM of bilateral shoulders, very prominent left AC joint. Tenderness of right GH joint with palpation. Limited ROM of bilateral hips and back.  History of synovitis in MCP's, right wrist and left ankle.   Psych: Alert, oriented, normal eye contact.    Labs:   11/10/2020: ADELA 1:2560 Speckled.   4/20/2021: TPO negative.   3/12/2021: C3/C4 ok, Aldolase ok. CRP 1.10 (elevated). TSH 0.2289 (l), TPO Ab <0.9, Thyroglobulin 46.8 (h), Sed 16 (<15), dsDNA <1:10, RNP Ab IgG 71 (moderately positive). RF IgA 13, RF IgG 10, RF IgM 52. "   2022- Infection panel (hep B/C/Quantiferon) negative.   23:  Hemoglobin 10.5.  ESR 84, normal less than 15.  Potassium slightly low 3.4.  CRP 8.2, normal less than 5.  LFTs normal.  C3, C4 okay.  CPK normal.  Hepatitis-B, hepatitis-C, HIV, quantiferon tb negative. Trace protein and no blood.  mg/G. Aldolase normal  3/2023: CBC and CMP ok. ESR and CRP ok. CPK and aldolase ok.   2023: CBC WBC 4.39 (previous 5.8) ANC 2 (previous 3.61) hgb 13.4, hct 40.5, otherwise ok. CMP ok.   23: CBC ok. CMP ok. ESR and CRP normal.     Imagin2021 CTA Chest NonCoronary: Suboptimal and limited study without gross evidence of pulmonary embolus. Bilateral progressed pulmonary changes reflective of congestive process without exclusion of associated atypical infection related to Covid 19. Bilateral dependent small amount of pleural effusions  3/31/2021 HR CT Thorax W/o Contrast:  Bilateral subpleural reticular densities with mild lower lung bronchiectasis and possible early honeycombing. Pattern of changes is probable for UIP.  23: CT chest: slight progression of the bilateral ground-glass interstitial lung changes with associated bronchiectasis.  Findings have a basilar predominance. Mild areas of developing fibrotic changes are seen in the right middle lobe in the periphery.  This was seen on the prior examination as well.  The upper lobes are relatively spared. There is some subcentimeter lymphadenopathy in the axillary regions bilaterally as well as the mediastinum, unchanged.    2020 PFT: FEV1 39%; FVC  38%; TLC  52%; DLCO  33%.  23: PFT's: FVC 48%, FEV1 45%, ratio 72, 93%, TLC 63%, %, DLCO 22%, DLCO/VA 55%.     CT ab/pelvis: 22: No acute abnormality of the abdomen and pelvis. 2. Changes consistent with chronic interstitial fibrosis in the bilateral lung bases.    2021 Xray Left Hip: Sclerotic changes identified in relation to the femoral head changes of avascular  necrosis cannot be completely excluded and therefore other imaging modalities might prove helpful for further assessment. Degenerative changes.   Xray Right Hip: Sclerotic changes identified in relation to the femoral head changes of avascular necrosis cannot be completely excluded and therefore other imaging modalities might prove helpful for further assessment.  Degenerative changes.    Left hip: 2/23/23: There is no displaced fracture identified. There are degenerativechanges of the left hip with joint space narrowing, osteophyte formation and subchondral cystic changes. The pelvic ring structures  are intact. The soft tissues are unremarkable.     X-ray lumbar spine 9/2021: There are 5 nonrib-bearing lumbar-type vertebral bodies. Alignment ispreserved. The vertebral body heights are maintained. There is moderate disc height loss at L4-L5, mild at L1-L2 and L2-L3 with multilevel marginal osteophyte formation. There is moderate facet arthropathy in the lower lumbar spine. There is no evidence of spondylolysis. Scattered vascular calcifications are noted.    X-ray lumbar spine: 2/23/23: There are 5 nonrib-bearing lumbar-type vertebral bodies. There is amild rightward curvature of the lumbar spine. The vertebral body heights are maintained. There is mild disc height loss at L4-5 and L5-S1 with small multilevel marginal osteophytes. There is moderate facet arthropathy. The soft tissues are unremarkable.    Echo from 11/20 shows LVEF 50-55% w/ mild tricuspid regurg and RVSP 63 mmHg.   1/2021: Pulmonary hypertension RVSP 63    10/5/2021: ECHO: LV EF 50%  Echo 2/1/23: The left ventricle is normal in size with low normal systolic function. Mild right ventricular enlargement with low normal right ventricular systolic function. The estimated ejection fraction is 53%. Grade I left ventricular diastolic dysfunction. PASP 37 mgHg.     01/08/2021  XR Esophagram:  Small sliding hiatal hernia with intermittent reflux.     3/8/22  Chest Xray:  Prominent interstitial markings with subsegmental atelectatic changes of the bilateral bases.   10/6/2020 Right Knee Xray: Degenerative changes most pronounced in the medial joint compartment.       Assessment:       ICD-10-CM ICD-9-CM   1. Antisynthetase syndrome  D89.89 279.49   2. Rheumatoid arthritis involving multiple sites with positive rheumatoid factor  M05.79 714.0   3. Interstitial lung disease  J84.9 515   4. High risk medication use  Z79.899 V58.69   5. Type 2 diabetes mellitus without complication, with long-term current use of insulin  E11.9 250.00    Z79.4 V58.67   6. IBD (inflammatory bowel disease)  K52.9 558.9   7. Pulmonary HTN  I27.20 416.8   8. Avascular necrosis of bone  M87.00 733.40   9. Thyroid nodule  E04.1 241.0   10. Primary osteoarthritis of right knee  M17.11 715.16   11. Other drug-induced neutropenia  D70.2 288.03   12. Tobacco use  Z72.0 305.1   13. Chronic bilateral low back pain with left-sided sciatica  M54.42 724.2    G89.29 724.3     338.29   14. Chronic pain of both shoulders  M25.511 719.41    G89.29 338.29    M25.512    15. Needs flu shot  Z23 V04.81   16. Flu vaccine need  Z23 V04.81            Plan:     1. Antisynthetase syndrome: ADELA 1:2560 cytoplasmic / speckled and +PL-12 on myositis panel w/ adv ILD. Hx of oral ulcers, LAD on imaging, and inflammatory joint pain). Positive RNP, MCTD and myositis overlap. Using a walker today for hip and back pain, avascular necrosis of hips, muscle strength is ok. Normal CK, aldolase.  - Received one dose of Rituximab 1000 mg on 3/2/22 and on 3/16/22.   - Advanced ILD, UIP pattern. 2/1/23: CT chest: slight progression of the bilateral ground-glass interstitial lung changes with associated bronchiectasis.  Findings have a basilar predominance. Mild areas of developing fibrotic changes are seen in the right middle lobe in the periphery.  This was seen on the prior examination as well.  Subcentimeter lymphadenopathy in the  axillary regions bilaterally as well as the mediastinum, unchanged.  -2/2023 ECHO EF 53%. Grade I left ventricular diastolic dysfunction. PASP 37 mgHg.   -2/1/23: PFT's: FVC 48%, FEV1 45%, ratio 72, 93%, TLC 63%, %, DLCO 22%, DLCO/VA 55%.   - Can consider restarting OFEV in the future if needed at a low dose.  -Re-started Actemra 162 mg/0.9 ml subq every 7 days in 2/2023. Tolerating it well with out issues.  - Labs today. Repeat PFT's and CT chest.      2. Rheumatoid arthritis involving multiple sites with positive rheumatoid factor:Strongly + CCP and RF.  Previously on Actemra. No active synovitis on exam. Pain in hips secondary to OA and avascular necrosis. Pain in shoulders and back likely secondary to DJD.  - He was on Actemra in the past- tolerated well- was d/c due to concern for possible thyroid ca however bx benign. Received one dose of Rituximab 1000 mg on 3/2/22 and on 3/16/22.   - Re-started Actemra 162 mg subq every 7 days as mentioned above.     3. Interstitial lung disease: Severe ILD secondary to RA, overlap w/ antisynthetase syndrome, MCTD. PFT show severe restriction.   - Initially in March 2021, he had been on prednisone 60mg/d for more than a year in the past, off of steroids now. Actemra d/c 2/2 questionable thyroid malignancy, path came back as benign.   - Not on home O2 due to cost issues. Referral sent to pulm at last OV.  - Received one dose of Rituximab 1000 mg on 3/2/22 and on 3/16/22. No issues with it.   -Continue to hold OFEV as previously directed- possibly associated with colitis- GI treating with steroids at this time.    -Advanced ILD, UIP pattern. 2/1/23: CT chest: slight progression of the bilateral ground-glass interstitial lung changes and PFT's showed slight worsening in DLCO.  - Discussed smoking cessation, he is working on it, provided information for smoking cessation. Started him on Actemra.  - Repeat CT chest and PFT's.     4. High risk medication use   -Advised to  stay up-to-date on age appropriate vaccinations and malignancy screening with PCP.   -Persons with rheumatoid arthritis, lupus, psoriatic arthritis and other autoimmune diseases are at increased risk of cardiovascular disease including heart attack and stroke. -We recommend that all patients with these conditions have annual health maintenance exams including lipid measurements, blood pressure measurements, and smoking cessation counseling when applicable at their primary care provider's office.  -Continued lab monitoring.      5. Crohn's disease of colon per patient, requested records form [facility]. Was hospitalized for abdominal pain in 9/2022 at [facility], reviewed records, he was seen by Dr Tarango. CT abdo/pelvis normal on 9/27/22. Colonoscopy on 9/30/22 showed mod erythema and scattered ulcerations on transverse colon, severe ulcers in ascending colon, inflammation of cecum, biopsy taken, suspicious for crohn's. Biopsy showed active ulcer, reactive changes, no malignancy, mild crypt architectural disarray, dd include infection vs ILD.   -Did discuss with Symone MOLINA with Dr. Tarango- possibly caused by OFEV- patient currently holding OFEV and on Budesonide PRN with GI. Diarrhea resolved.      6. Hypertension, T2DM:   -Followed by PCP. He had seen endo in the past.      7. Osteoarthritis of right knee: Recommend stretches and exercises, tylenol as needed. In WC because of arthritis.     8. Right thyroid nodule FNA suspicious for follicular neoplasm s/p R thyroid lobectomy on 1/14/22. Path consistent with benign follicular adenoma. Had seen ENT and was discharged after the pathology results. No other nodules noted (per ENT notes 2/16/22) in intra-op or on UA. Benign lesion.     9. Pulmonary HTN: RVSP 41 mmHg (down from 63). Mild-to-Mod tricuspid regurg. Previously planned referral to [facility] for pulm HTN eval, but pt reports he can not get there.   - 2/2023 ECHO EF 53%. Grade I left  ventricular diastolic dysfunction. PASP 37 mgHg.      10. Avascular necrosis of the Hips. 2/2 to chronic high dose steroid use. Continues to have hip pain. Continue follow up with ortho. Planning to see pain management.     11.  Neutropenia: mild and intermittent, could be form Actemra, check CBC today. Continue to monitor.     12. Bilateral shoulder pain and lower back pain with sciatica:  Likely secondary to DJD.  After long discussion he is willing to start physical therapy again.  Advised to stay active, use heat therapy and topical analgesic cream as needed.  Educated not to take diclofenac and naproxen together.  Discontinue naproxen.  Advised to take diclofenac only as needed and advised not to take it as a scheduled medication daily.  Discussed the risks and benefits of NSAIDs with the patient.  - sent referral to physical therapy. Will get X-rays of bilateral shoulders.          Follow up in about 3 months (around 2/9/2024) for with NP and 6 months with me. In addition to their scheduled follow up, the patient has also been instructed to follow up on as needed basis.      Total time spent with patient and documentation is more than 40 minutes. All questions were answered to patient's satisfaction and patient verbalized understanding.

## 2023-11-14 ENCOUNTER — HOSPITAL ENCOUNTER (OUTPATIENT)
Dept: RADIOLOGY | Facility: HOSPITAL | Age: 63
Discharge: HOME | End: 2023-11-14
Attending: INTERNAL MEDICINE
Payer: MEDICARE

## 2023-11-14 DIAGNOSIS — D89.89 ANTISYNTHETASE SYNDROME: ICD-10-CM

## 2023-11-14 DIAGNOSIS — M25.511 CHRONIC PAIN OF BOTH SHOULDERS: ICD-10-CM

## 2023-11-14 DIAGNOSIS — M25.512 CHRONIC PAIN OF BOTH SHOULDERS: ICD-10-CM

## 2023-11-14 DIAGNOSIS — G89.29 CHRONIC PAIN OF BOTH SHOULDERS: ICD-10-CM

## 2023-11-14 PROCEDURE — 73030 X-RAY EXAM OF SHOULDER: CPT | Mod: TC,LT

## 2023-11-14 PROCEDURE — 73030 X-RAY EXAM OF SHOULDER: CPT | Mod: TC,RT

## 2023-11-15 PROCEDURE — 82570 ASSAY OF URINE CREATININE: CPT | Performed by: INTERNAL MEDICINE

## 2023-11-21 ENCOUNTER — PROCEDURE VISIT (OUTPATIENT)
Dept: RESPIRATORY THERAPY | Facility: HOSPITAL | Age: 63
End: 2023-11-21
Attending: INTERNAL MEDICINE
Payer: MEDICARE

## 2023-11-21 ENCOUNTER — HOSPITAL ENCOUNTER (OUTPATIENT)
Dept: RADIOLOGY | Facility: HOSPITAL | Age: 63
Discharge: HOME | End: 2023-11-21
Attending: INTERNAL MEDICINE
Payer: MEDICARE

## 2023-11-21 VITALS — RESPIRATION RATE: 20 BRPM | OXYGEN SATURATION: 95 % | HEART RATE: 103 BPM

## 2023-11-21 DIAGNOSIS — D89.89 ANTISYNTHETASE SYNDROME: ICD-10-CM

## 2023-11-21 DIAGNOSIS — J84.9 INTERSTITIAL LUNG DISEASE: ICD-10-CM

## 2023-11-21 PROCEDURE — 94760 N-INVAS EAR/PLS OXIMETRY 1: CPT

## 2023-11-21 PROCEDURE — 94060 EVALUATION OF WHEEZING: CPT

## 2023-11-21 PROCEDURE — 94729 DIFFUSING CAPACITY: CPT

## 2023-11-21 PROCEDURE — 71250 CT THORAX DX C-: CPT | Mod: TC

## 2023-11-21 PROCEDURE — 94640 PR INHAL RX, AIRWAY OBST/DX SPUTUM INDUCT: ICD-10-PCS | Mod: ,,, | Performed by: INTERNAL MEDICINE

## 2023-11-21 PROCEDURE — 94727 GAS DIL/WSHOT DETER LNG VOL: CPT

## 2023-11-21 PROCEDURE — 94640 AIRWAY INHALATION TREATMENT: CPT | Mod: ,,, | Performed by: INTERNAL MEDICINE

## 2023-11-21 RX ORDER — ALBUTEROL SULFATE 0.83 MG/ML
SOLUTION RESPIRATORY (INHALATION)
Status: DISPENSED
Start: 2023-11-21 | End: 2023-11-22

## 2023-11-21 RX ORDER — ALBUTEROL SULFATE 0.83 MG/ML
2.5 SOLUTION RESPIRATORY (INHALATION)
Status: COMPLETED | OUTPATIENT
Start: 2023-11-21 | End: 2023-11-21

## 2023-11-21 RX ADMIN — ALBUTEROL SULFATE 2.5 MG: 0.83 SOLUTION RESPIRATORY (INHALATION) at 09:11

## 2023-12-27 ENCOUNTER — TELEPHONE (OUTPATIENT)
Dept: RHEUMATOLOGY | Facility: CLINIC | Age: 63
End: 2023-12-27
Payer: MEDICARE

## 2023-12-28 NOTE — TELEPHONE ENCOUNTER
Spoke with pt [facility] [place] [facility]. Pt started going November 27, 2023

## 2024-01-30 ENCOUNTER — TELEPHONE (OUTPATIENT)
Dept: RHEUMATOLOGY | Facility: CLINIC | Age: 64
End: 2024-01-30
Payer: MEDICARE

## 2024-01-30 NOTE — TELEPHONE ENCOUNTER
Received PA request for actemra from [facility] for cover my meds key: [id number] submitted to plan

## 2024-02-07 NOTE — TELEPHONE ENCOUNTER
Called [facility] to notify of approval and was told that patient received Actemra 1/17/24 and is not due for refill until next week.

## 2024-04-24 ENCOUNTER — TELEPHONE (OUTPATIENT)
Dept: RHEUMATOLOGY | Facility: CLINIC | Age: 64
End: 2024-04-24
Payer: MEDICARE

## 2024-04-24 NOTE — TELEPHONE ENCOUNTER
Received fax from [facility]. Last labs collected after last visit 11/9/23. No upcoming appointments scheduled. Patient cancelled 2/9 and 5/9/2024.    Called patient to verify that he would like to change pharmacy to [facility]. Asked him if he has a change in insurance and if he would like to reschedule so labs can be ordered or appointment can be scheduled. He said no he has same [employer] insurance.   Also I checked with front to verify we do not accept his insurance.

## 2024-12-18 ENCOUNTER — HOSPITAL ENCOUNTER (EMERGENCY)
Facility: HOSPITAL | Age: 64
Discharge: LEFT BEFORE DISCHARGE | End: 2024-12-19
Attending: INTERNAL MEDICINE
Payer: MEDICARE

## 2024-12-18 DIAGNOSIS — R07.9 CHEST PAIN: ICD-10-CM

## 2024-12-18 DIAGNOSIS — J18.9 PNEUMONIA OF BOTH LUNGS DUE TO INFECTIOUS ORGANISM, UNSPECIFIED PART OF LUNG: Primary | ICD-10-CM

## 2024-12-18 PROCEDURE — 93005 ELECTROCARDIOGRAM TRACING: CPT

## 2024-12-18 PROCEDURE — 99285 EMERGENCY DEPT VISIT HI MDM: CPT | Mod: 25

## 2024-12-18 PROCEDURE — 93010 ELECTROCARDIOGRAM REPORT: CPT | Mod: ,,, | Performed by: INTERNAL MEDICINE

## 2024-12-19 VITALS
HEART RATE: 104 BPM | OXYGEN SATURATION: 97 % | WEIGHT: 280 LBS | DIASTOLIC BLOOD PRESSURE: 103 MMHG | BODY MASS INDEX: 35.94 KG/M2 | RESPIRATION RATE: 18 BRPM | HEIGHT: 74 IN | TEMPERATURE: 98 F | SYSTOLIC BLOOD PRESSURE: 131 MMHG

## 2024-12-19 PROBLEM — J18.9 PNEUMONIA OF BOTH LUNGS DUE TO INFECTIOUS ORGANISM: Status: ACTIVE | Noted: 2024-12-19

## 2024-12-19 PROBLEM — R07.9 CHEST PAIN: Status: ACTIVE | Noted: 2024-12-19

## 2024-12-19 LAB
ALBUMIN SERPL-MCNC: 2.9 G/DL (ref 3.4–4.8)
ALBUMIN/GLOB SERPL: 0.6 RATIO (ref 1.1–2)
ALP SERPL-CCNC: 98 UNIT/L (ref 40–150)
ALT SERPL-CCNC: 14 UNIT/L (ref 0–55)
ANION GAP SERPL CALC-SCNC: 8 MEQ/L
AST SERPL-CCNC: 13 UNIT/L (ref 5–34)
BASOPHILS # BLD AUTO: 0.06 X10(3)/MCL
BASOPHILS NFR BLD AUTO: 0.5 %
BILIRUB SERPL-MCNC: 0.4 MG/DL
BNP BLD-MCNC: 38.6 PG/ML
BUN SERPL-MCNC: 21.3 MG/DL (ref 8.4–25.7)
CALCIUM SERPL-MCNC: 8.5 MG/DL (ref 8.8–10)
CHLORIDE SERPL-SCNC: 101 MMOL/L (ref 98–107)
CO2 SERPL-SCNC: 27 MMOL/L (ref 23–31)
CREAT SERPL-MCNC: 1.11 MG/DL (ref 0.72–1.25)
CREAT/UREA NIT SERPL: 19
EOSINOPHIL # BLD AUTO: 0.3 X10(3)/MCL (ref 0–0.9)
EOSINOPHIL NFR BLD AUTO: 2.7 %
ERYTHROCYTE [DISTWIDTH] IN BLOOD BY AUTOMATED COUNT: 15.4 % (ref 11.5–17)
GFR SERPLBLD CREATININE-BSD FMLA CKD-EPI: >60 ML/MIN/1.73/M2
GLOBULIN SER-MCNC: 4.5 GM/DL (ref 2.4–3.5)
GLUCOSE SERPL-MCNC: 109 MG/DL (ref 82–115)
HCT VFR BLD AUTO: 37.8 % (ref 42–52)
HGB BLD-MCNC: 12.7 G/DL (ref 14–18)
IMM GRANULOCYTES # BLD AUTO: 0.05 X10(3)/MCL (ref 0–0.04)
IMM GRANULOCYTES NFR BLD AUTO: 0.4 %
INR PPP: 1.1
LYMPHOCYTES # BLD AUTO: 1.65 X10(3)/MCL (ref 0.6–4.6)
LYMPHOCYTES NFR BLD AUTO: 14.8 %
MCH RBC QN AUTO: 27.9 PG (ref 27–31)
MCHC RBC AUTO-ENTMCNC: 33.6 G/DL (ref 33–36)
MCV RBC AUTO: 83.1 FL (ref 80–94)
MONOCYTES # BLD AUTO: 1.09 X10(3)/MCL (ref 0.1–1.3)
MONOCYTES NFR BLD AUTO: 9.8 %
NEUTROPHILS # BLD AUTO: 7.99 X10(3)/MCL (ref 2.1–9.2)
NEUTROPHILS NFR BLD AUTO: 71.8 %
NRBC BLD AUTO-RTO: 0 %
OHS QRS DURATION: 126 MS
OHS QTC CALCULATION: 473 MS
PLATELET # BLD AUTO: 353 X10(3)/MCL (ref 130–400)
PMV BLD AUTO: 10.4 FL (ref 7.4–10.4)
POTASSIUM SERPL-SCNC: 3.6 MMOL/L (ref 3.5–5.1)
PROT SERPL-MCNC: 7.4 GM/DL (ref 5.8–7.6)
PROTHROMBIN TIME: 14 SECONDS (ref 12.5–14.5)
RBC # BLD AUTO: 4.55 X10(6)/MCL (ref 4.7–6.1)
SODIUM SERPL-SCNC: 136 MMOL/L (ref 136–145)
TROPONIN I SERPL-MCNC: 0.02 NG/ML (ref 0–0.04)
WBC # BLD AUTO: 11.14 X10(3)/MCL (ref 4.5–11.5)

## 2024-12-19 PROCEDURE — 99900035 HC TECH TIME PER 15 MIN (STAT)

## 2024-12-19 PROCEDURE — 85025 COMPLETE CBC W/AUTO DIFF WBC: CPT | Performed by: STUDENT IN AN ORGANIZED HEALTH CARE EDUCATION/TRAINING PROGRAM

## 2024-12-19 PROCEDURE — 83880 ASSAY OF NATRIURETIC PEPTIDE: CPT | Performed by: STUDENT IN AN ORGANIZED HEALTH CARE EDUCATION/TRAINING PROGRAM

## 2024-12-19 PROCEDURE — 84484 ASSAY OF TROPONIN QUANT: CPT | Performed by: STUDENT IN AN ORGANIZED HEALTH CARE EDUCATION/TRAINING PROGRAM

## 2024-12-19 PROCEDURE — 94640 AIRWAY INHALATION TREATMENT: CPT

## 2024-12-19 PROCEDURE — 25000242 PHARM REV CODE 250 ALT 637 W/ HCPCS: Performed by: INTERNAL MEDICINE

## 2024-12-19 PROCEDURE — 85610 PROTHROMBIN TIME: CPT | Performed by: STUDENT IN AN ORGANIZED HEALTH CARE EDUCATION/TRAINING PROGRAM

## 2024-12-19 PROCEDURE — 80053 COMPREHEN METABOLIC PANEL: CPT | Performed by: STUDENT IN AN ORGANIZED HEALTH CARE EDUCATION/TRAINING PROGRAM

## 2024-12-19 PROCEDURE — 94799 UNLISTED PULMONARY SVC/PX: CPT

## 2024-12-19 PROCEDURE — 25000003 PHARM REV CODE 250: Performed by: INTERNAL MEDICINE

## 2024-12-19 RX ORDER — IPRATROPIUM BROMIDE AND ALBUTEROL SULFATE 2.5; .5 MG/3ML; MG/3ML
3 SOLUTION RESPIRATORY (INHALATION)
Status: COMPLETED | OUTPATIENT
Start: 2024-12-19 | End: 2024-12-19

## 2024-12-19 RX ORDER — ALBUTEROL SULFATE 90 UG/1
2 INHALANT RESPIRATORY (INHALATION) EVERY 4 HOURS PRN
Qty: 18 G | Refills: 0 | Status: SHIPPED | OUTPATIENT
Start: 2024-12-19 | End: 2024-12-19

## 2024-12-19 RX ORDER — AMOXICILLIN AND CLAVULANATE POTASSIUM 875; 125 MG/1; MG/1
1 TABLET, FILM COATED ORAL 2 TIMES DAILY
Qty: 14 TABLET | Refills: 0 | Status: SHIPPED | OUTPATIENT
Start: 2024-12-19 | End: 2024-12-19

## 2024-12-19 RX ORDER — ALBUTEROL SULFATE 90 UG/1
2 INHALANT RESPIRATORY (INHALATION) EVERY 4 HOURS PRN
Qty: 18 G | Refills: 0 | Status: SHIPPED | OUTPATIENT
Start: 2024-12-19 | End: 2025-12-19

## 2024-12-19 RX ORDER — AMOXICILLIN AND CLAVULANATE POTASSIUM 875; 125 MG/1; MG/1
1 TABLET, FILM COATED ORAL
Status: COMPLETED | OUTPATIENT
Start: 2024-12-19 | End: 2024-12-19

## 2024-12-19 RX ORDER — AMOXICILLIN AND CLAVULANATE POTASSIUM 875; 125 MG/1; MG/1
1 TABLET, FILM COATED ORAL 2 TIMES DAILY
Qty: 14 TABLET | Refills: 0 | Status: SHIPPED | OUTPATIENT
Start: 2024-12-19 | End: 2024-12-26

## 2024-12-19 RX ADMIN — IPRATROPIUM BROMIDE AND ALBUTEROL SULFATE 3 ML: .5; 3 SOLUTION RESPIRATORY (INHALATION) at 03:12

## 2024-12-19 RX ADMIN — AMOXICILLIN AND CLAVULANATE POTASSIUM 1 TABLET: 875; 125 TABLET, FILM COATED ORAL at 03:12

## 2024-12-19 NOTE — ED PROVIDER NOTES
Encounter Date: 12/18/2024       History     Chief Complaint   Patient presents with    Chest Pain     Pt c/o right sided CP that started around 1600, also reports SOB. States was supposed to f/u w/ cardiologist but never made it.     64-year-old male with a past medical history of diabetes, GERD, hypertension, interstitial lung disease and rheumatoid arthritis who presents with chest pain and shortness of breath.  Patient reports today after he took a shower he noticed a quick pain on his right chest that resolved quickly.  He states this occurred 2 more times over the day.  There is associated shortness of breath which he does have chronically but states it is mildly worsened.  He does admit to an occasional nonproductive cough.  He is currently without any chest pain.  He denies fevers, chills, nausea, vomiting, abdominal pain, diarrhea, rhinorrhea, sore throat, headache, visual changes, neck pain, back pain, dizziness, lightheadedness, extremity swelling    The history is provided by the patient.     Review of patient's allergies indicates:  No Known Allergies  Past Medical History:   Diagnosis Date    ADELA positive     Antisynthetase syndrome     Diabetes mellitus     GERD (gastroesophageal reflux disease)     Herpes zoster     Hypertension     ILD (interstitial lung disease)     Lymphadenopathy     Morbid obesity     Osteoarthritis of right knee     Rheumatoid arthritis     Snoring     Supraclavicular mass     Thyroid nodule     Venous insufficiency of lower extremity      Past Surgical History:   Procedure Laterality Date    Drainage of right thyroid gland lobe  10/07/2021    Laser surgery left leg Left     Right hand surgery       Family History   Problem Relation Name Age of Onset    Heart disease Mother      Breast cancer Mother      Heart attack Mother       Social History     Tobacco Use    Smoking status: Every Day     Current packs/day: 0.25     Types: Cigarettes    Smokeless tobacco: Never    Tobacco  comments:     Only about 2 cigarettes a day    Substance Use Topics    Alcohol use: Yes     Comment: One beer a month    Drug use: Yes     Types: Marijuana     Comment: Occasionally     Review of Systems   All other systems reviewed and are negative.      Physical Exam     Initial Vitals [12/18/24 2258]   BP Pulse Resp Temp SpO2   (!) 140/93 (!) 118 20 98 °F (36.7 °C) (!) 93 %      MAP       --         Physical Exam    Constitutional: He appears well-developed and well-nourished. He is Obese . He is cooperative.  Non-toxic appearance. He appears ill (Chronically).   HENT:   Head: Normocephalic and atraumatic. Mouth/Throat: Uvula is midline, oropharynx is clear and moist and mucous membranes are normal.   Eyes: Conjunctivae, EOM and lids are normal. Pupils are equal, round, and reactive to light.   Neck: Trachea normal and phonation normal. Neck supple.    Full passive range of motion without pain.     Cardiovascular:  Normal rate, regular rhythm, normal heart sounds and normal pulses.           No murmur heard.  Pulmonary/Chest: No accessory muscle usage. No tachypnea. No respiratory distress. He has no decreased breath sounds. He has no wheezes. He has rhonchi. He has no rales.   Abdominal: Abdomen is soft. Bowel sounds are normal. He exhibits no distension. There is no abdominal tenderness. No hernia. There is no rebound and no guarding.   Musculoskeletal:      Cervical back: Full passive range of motion without pain and neck supple.     Neurological: He is alert and oriented to person, place, and time. GCS eye subscore is 4. GCS verbal subscore is 5. GCS motor subscore is 6.   Skin: Skin is warm, dry and intact. Capillary refill takes less than 2 seconds. No rash noted.   Psychiatric: He has a normal mood and affect. His speech is normal and behavior is normal. Judgment and thought content normal. Cognition and memory are normal.         ED Course   Procedures  Labs Reviewed   COMPREHENSIVE METABOLIC PANEL -  Abnormal       Result Value    Sodium 136      Potassium 3.6      Chloride 101      CO2 27      Glucose 109      Blood Urea Nitrogen 21.3      Creatinine 1.11      Calcium 8.5 (*)     Protein Total 7.4      Albumin 2.9 (*)     Globulin 4.5 (*)     Albumin/Globulin Ratio 0.6 (*)     Bilirubin Total 0.4      ALP 98      ALT 14      AST 13      eGFR >60      Anion Gap 8.0      BUN/Creatinine Ratio 19     CBC WITH DIFFERENTIAL - Abnormal    WBC 11.14      RBC 4.55 (*)     Hgb 12.7 (*)     Hct 37.8 (*)     MCV 83.1      MCH 27.9      MCHC 33.6      RDW 15.4      Platelet 353      MPV 10.4      Neut % 71.8      Lymph % 14.8      Mono % 9.8      Eos % 2.7      Basophil % 0.5      Lymph # 1.65      Neut # 7.99      Mono # 1.09      Eos # 0.30      Baso # 0.06      IG# 0.05 (*)     IG% 0.4      NRBC% 0.0     B-TYPE NATRIURETIC PEPTIDE - Normal    Natriuretic Peptide 38.6     TROPONIN I - Normal    Troponin-I 0.018     PROTIME-INR - Normal    PT 14.0      INR 1.1     CBC W/ AUTO DIFFERENTIAL    Narrative:     The following orders were created for panel order CBC auto differential.  Procedure                               Abnormality         Status                     ---------                               -----------         ------                     CBC with Differential[[id number]]       Abnormal            Final result                 Please view results for these tests on the individual orders.     EKG Readings: (Independently Interpreted)   Sinus tachycardia, occasional PACs, rate of 110 beats per minute.  Nonspecific intraventricular conduction block but no ischemic changes or arrhythmia.       Imaging Results              X-Ray Chest AP Portable (In process)                      Medications   albuterol-ipratropium 2.5 mg-0.5 mg/3 mL nebulizer solution 3 mL (3 mLs Nebulization Given 12/19/24 0301)   amoxicillin-clavulanate 875-125mg per tablet 1 tablet (1 tablet Oral Given 12/19/24 0302)     Medical Decision  Making  Differential diagnosis includes pneumonia, effusions, ACS, MSK    64-year-old male presenting with chest pain and shortness of breath.  Vital signs show an SpO2 of 93% on room air, heart rate 118 but otherwise stable and afebrile.    Labs are without leukocytosis, anemia, abnormal electrolytes, elevated troponin.  Chest x-ray is concerning for pneumonia.  Patient was given a breathing treatment and on re-evaluation reports significant improvement of symptoms.  Airflow is improved, no wheezing or rhonchi.  He was given a dose of Augmentin.  Results discussed with patient informed him symptoms likely secondary to ammonia.  He does smoke cigarettes, stressed the importance of cessation.  A Proventil inhaler will be prescribed for home as well as full course of Augmentin.  Stressed the importance of follow up with PCP.  Strict return precautions given.  Patient verbalized an understanding of the plan and agreed.    Problems Addressed:  Chest pain: undiagnosed new problem with uncertain prognosis  Pneumonia of both lungs due to infectious organism, unspecified part of lung: undiagnosed new problem with uncertain prognosis    Amount and/or Complexity of Data Reviewed  External Data Reviewed: labs, radiology, ECG and notes.  Labs: ordered. Decision-making details documented in ED Course.  Radiology: ordered and independent interpretation performed. Decision-making details documented in ED Course.  ECG/medicine tests: ordered and independent interpretation performed.     Details: Sinus tachycardia, occasional PACs, rate of 110 beats per minute.  Nonspecific intraventricular conduction block but no ischemic changes or arrhythmia.    Risk  OTC drugs.  Prescription drug management.                                      Clinical Impression:  Final diagnoses:  [R07.9] Chest pain  [J18.9] Pneumonia of both lungs due to infectious organism, unspecified part of lung (Primary)          ED Disposition Condition    Discharge  Stable          ED Prescriptions       Medication Sig Dispense Start Date End Date Auth. Provider    amoxicillin-clavulanate 875-125mg (AUGMENTIN) 875-125 mg per tablet Take 1 tablet by mouth 2 (two) times daily. for 7 days 14 tablet 12/19/2024 12/26/2024 Oleg Lindo DO    albuterol (PROVENTIL HFA) 90 mcg/actuation inhaler Inhale 2 puffs into the lungs every 4 (four) hours as needed for Wheezing or Shortness of Breath. Rescue 18 g 12/19/2024 12/19/2025 Oleg Lindo DO          Follow-up Information       Follow up With Specialties Details Why Contact Info    Sarabjit Arreaga MD Internal Medicine Schedule an appointment as soon as possible for a visit  For emergency department follow up [address] [place] [address] [id number] [phone]               Oleg Lindo DO  12/19/24 0337

## 2024-12-19 NOTE — ED NOTES
Pt. Dc home he states understanding of dc rx and the need for follow up he has no further questions at this time

## 2025-01-18 ENCOUNTER — HOSPITAL ENCOUNTER (INPATIENT)
Facility: HOSPITAL | Age: 65
LOS: 5 days | Discharge: HOME | DRG: 198 | End: 2025-01-24
Attending: EMERGENCY MEDICINE | Admitting: INTERNAL MEDICINE
Payer: MEDICARE

## 2025-01-18 DIAGNOSIS — J84.9 INTERSTITIAL LUNG DISEASE: ICD-10-CM

## 2025-01-18 DIAGNOSIS — I27.20 PULMONARY HTN: ICD-10-CM

## 2025-01-18 DIAGNOSIS — R06.02 SHORTNESS OF BREATH: ICD-10-CM

## 2025-01-18 DIAGNOSIS — J18.9 PNEUMONIA DUE TO INFECTIOUS ORGANISM, UNSPECIFIED LATERALITY, UNSPECIFIED PART OF LUNG: ICD-10-CM

## 2025-01-18 DIAGNOSIS — R07.9 CHEST PAIN: Primary | ICD-10-CM

## 2025-01-18 LAB
ALBUMIN SERPL-MCNC: 2.8 G/DL (ref 3.4–4.8)
ALBUMIN/GLOB SERPL: 0.6 RATIO (ref 1.1–2)
ALP SERPL-CCNC: 106 UNIT/L (ref 40–150)
ALT SERPL-CCNC: 8 UNIT/L (ref 0–55)
ANION GAP SERPL CALC-SCNC: 11 MEQ/L
ANION GAP SERPL CALC-SCNC: 15 MMOL/L (ref 8–16)
AST SERPL-CCNC: 13 UNIT/L (ref 5–34)
BASOPHILS # BLD AUTO: 0.04 X10(3)/MCL
BASOPHILS NFR BLD AUTO: 0.4 %
BILIRUB SERPL-MCNC: 0.7 MG/DL
BUN SERPL-MCNC: 15.3 MG/DL (ref 8.4–25.7)
BUN SERPL-MCNC: 16 MG/DL (ref 6–30)
CALCIUM SERPL-MCNC: 8.6 MG/DL (ref 8.8–10)
CHLORIDE SERPL-SCNC: 91 MMOL/L (ref 95–110)
CHLORIDE SERPL-SCNC: 93 MMOL/L (ref 98–107)
CK SERPL-CCNC: 97 U/L (ref 30–200)
CO2 SERPL-SCNC: 23 MMOL/L (ref 23–31)
CREAT SERPL-MCNC: 0.84 MG/DL (ref 0.72–1.25)
CREAT SERPL-MCNC: 1 MG/DL (ref 0.5–1.4)
CREAT/UREA NIT SERPL: 18
EOSINOPHIL # BLD AUTO: 0.08 X10(3)/MCL (ref 0–0.9)
EOSINOPHIL NFR BLD AUTO: 0.8 %
ERYTHROCYTE [DISTWIDTH] IN BLOOD BY AUTOMATED COUNT: 14.6 % (ref 11.5–17)
FLUAV AG UPPER RESP QL IA.RAPID: NOT DETECTED
FLUBV AG UPPER RESP QL IA.RAPID: NOT DETECTED
GFR SERPLBLD CREATININE-BSD FMLA CKD-EPI: >60 ML/MIN/1.73/M2
GLOBULIN SER-MCNC: 4.7 GM/DL (ref 2.4–3.5)
GLUCOSE SERPL-MCNC: 108 MG/DL (ref 82–115)
GLUCOSE SERPL-MCNC: 114 MG/DL (ref 70–110)
HCT VFR BLD AUTO: 34.2 % (ref 42–52)
HCT VFR BLD CALC: 37 %PCV (ref 36–54)
HGB BLD-MCNC: 11.8 G/DL (ref 14–18)
HGB BLD-MCNC: 13 G/DL
IMM GRANULOCYTES # BLD AUTO: 0.06 X10(3)/MCL (ref 0–0.04)
IMM GRANULOCYTES NFR BLD AUTO: 0.6 %
LYMPHOCYTES # BLD AUTO: 1.22 X10(3)/MCL (ref 0.6–4.6)
LYMPHOCYTES NFR BLD AUTO: 12.7 %
MAGNESIUM SERPL-MCNC: 1.7 MG/DL (ref 1.6–2.6)
MCH RBC QN AUTO: 27.8 PG (ref 27–31)
MCHC RBC AUTO-ENTMCNC: 34.5 G/DL (ref 33–36)
MCV RBC AUTO: 80.7 FL (ref 80–94)
MONOCYTES # BLD AUTO: 0.95 X10(3)/MCL (ref 0.1–1.3)
MONOCYTES NFR BLD AUTO: 9.9 %
NEUTROPHILS # BLD AUTO: 7.27 X10(3)/MCL (ref 2.1–9.2)
NEUTROPHILS NFR BLD AUTO: 75.6 %
NRBC BLD AUTO-RTO: 0 %
PLATELET # BLD AUTO: 361 X10(3)/MCL (ref 130–400)
PMV BLD AUTO: 10.5 FL (ref 7.4–10.4)
POC IONIZED CALCIUM: 1.06 MMOL/L (ref 1.06–1.42)
POC TCO2 (MEASURED): 27 MMOL/L (ref 23–29)
POTASSIUM BLD-SCNC: 3.3 MMOL/L (ref 3.5–5.1)
POTASSIUM SERPL-SCNC: 3.4 MMOL/L (ref 3.5–5.1)
PROT SERPL-MCNC: 7.5 GM/DL (ref 5.8–7.6)
RBC # BLD AUTO: 4.24 X10(6)/MCL (ref 4.7–6.1)
RSV A 5' UTR RNA NPH QL NAA+PROBE: NOT DETECTED
SAMPLE: ABNORMAL
SARS-COV-2 RNA RESP QL NAA+PROBE: NOT DETECTED
SODIUM BLD-SCNC: 129 MMOL/L (ref 136–145)
SODIUM SERPL-SCNC: 127 MMOL/L (ref 136–145)
TSH SERPL-ACNC: 0.8 UIU/ML (ref 0.35–4.94)
WBC # BLD AUTO: 9.62 X10(3)/MCL (ref 4.5–11.5)

## 2025-01-18 PROCEDURE — 80053 COMPREHEN METABOLIC PANEL: CPT | Performed by: NURSE PRACTITIONER

## 2025-01-18 PROCEDURE — 0241U COVID/RSV/FLU A&B PCR: CPT | Performed by: NURSE PRACTITIONER

## 2025-01-18 PROCEDURE — 84443 ASSAY THYROID STIM HORMONE: CPT | Performed by: NURSE PRACTITIONER

## 2025-01-18 PROCEDURE — 82550 ASSAY OF CK (CPK): CPT | Performed by: NURSE PRACTITIONER

## 2025-01-18 PROCEDURE — 83880 ASSAY OF NATRIURETIC PEPTIDE: CPT | Performed by: PHYSICIAN ASSISTANT

## 2025-01-18 PROCEDURE — 96372 THER/PROPH/DIAG INJ SC/IM: CPT | Performed by: NURSE PRACTITIONER

## 2025-01-18 PROCEDURE — 83735 ASSAY OF MAGNESIUM: CPT | Performed by: NURSE PRACTITIONER

## 2025-01-18 PROCEDURE — 63600175 PHARM REV CODE 636 W HCPCS: Mod: JZ,TB | Performed by: NURSE PRACTITIONER

## 2025-01-18 PROCEDURE — 85025 COMPLETE CBC W/AUTO DIFF WBC: CPT | Performed by: NURSE PRACTITIONER

## 2025-01-18 PROCEDURE — 25000003 PHARM REV CODE 250: Performed by: NURSE PRACTITIONER

## 2025-01-18 PROCEDURE — 84484 ASSAY OF TROPONIN QUANT: CPT | Performed by: PHYSICIAN ASSISTANT

## 2025-01-18 RX ORDER — KETOROLAC TROMETHAMINE 30 MG/ML
30 INJECTION, SOLUTION INTRAMUSCULAR; INTRAVENOUS
Status: COMPLETED | OUTPATIENT
Start: 2025-01-18 | End: 2025-01-18

## 2025-01-18 RX ORDER — HYDROCODONE BITARTRATE AND ACETAMINOPHEN 5; 325 MG/1; MG/1
1 TABLET ORAL
Status: COMPLETED | OUTPATIENT
Start: 2025-01-18 | End: 2025-01-18

## 2025-01-18 RX ORDER — SODIUM CHLORIDE 9 MG/ML
500 INJECTION, SOLUTION INTRAVENOUS
Status: COMPLETED | OUTPATIENT
Start: 2025-01-18 | End: 2025-01-18

## 2025-01-18 RX ADMIN — HYDROCODONE BITARTRATE AND ACETAMINOPHEN 1 TABLET: 5; 325 TABLET ORAL at 08:01

## 2025-01-18 RX ADMIN — SODIUM CHLORIDE 500 ML: 9 INJECTION, SOLUTION INTRAVENOUS at 10:01

## 2025-01-18 RX ADMIN — KETOROLAC TROMETHAMINE 30 MG: 30 INJECTION, SOLUTION INTRAMUSCULAR at 08:01

## 2025-01-19 PROBLEM — R06.02 SHORTNESS OF BREATH: Status: ACTIVE | Noted: 2025-01-19

## 2025-01-19 LAB
ALBUMIN SERPL-MCNC: 2.6 G/DL (ref 3.4–4.8)
ALBUMIN/GLOB SERPL: 0.5 RATIO (ref 1.1–2)
ALP SERPL-CCNC: 100 UNIT/L (ref 40–150)
ALT SERPL-CCNC: 7 UNIT/L (ref 0–55)
ANION GAP SERPL CALC-SCNC: 10 MEQ/L
AST SERPL-CCNC: 13 UNIT/L (ref 5–34)
BACTERIA #/AREA URNS AUTO: ABNORMAL /HPF
BASOPHILS # BLD AUTO: 0.06 X10(3)/MCL
BASOPHILS NFR BLD AUTO: 0.8 %
BILIRUB SERPL-MCNC: 0.6 MG/DL
BILIRUB UR QL STRIP.AUTO: NEGATIVE
BNP BLD-MCNC: 18.8 PG/ML
BUN SERPL-MCNC: 15.7 MG/DL (ref 8.4–25.7)
CALCIUM SERPL-MCNC: 9 MG/DL (ref 8.8–10)
CHLORIDE SERPL-SCNC: 93 MMOL/L (ref 98–107)
CLARITY UR: CLEAR
CO2 SERPL-SCNC: 24 MMOL/L (ref 23–31)
COLOR UR AUTO: YELLOW
CREAT SERPL-MCNC: 0.83 MG/DL (ref 0.72–1.25)
CREAT/UREA NIT SERPL: 19
EOSINOPHIL # BLD AUTO: 0.16 X10(3)/MCL (ref 0–0.9)
EOSINOPHIL NFR BLD AUTO: 2 %
ERYTHROCYTE [DISTWIDTH] IN BLOOD BY AUTOMATED COUNT: 14.8 % (ref 11.5–17)
GFR SERPLBLD CREATININE-BSD FMLA CKD-EPI: >60 ML/MIN/1.73/M2
GLOBULIN SER-MCNC: 5.3 GM/DL (ref 2.4–3.5)
GLUCOSE SERPL-MCNC: 120 MG/DL (ref 82–115)
GLUCOSE UR QL STRIP: NORMAL
HCT VFR BLD AUTO: 35.8 % (ref 42–52)
HGB BLD-MCNC: 11.9 G/DL (ref 14–18)
HGB UR QL STRIP: NEGATIVE
IMM GRANULOCYTES # BLD AUTO: 0.04 X10(3)/MCL (ref 0–0.04)
IMM GRANULOCYTES NFR BLD AUTO: 0.5 %
KETONES UR QL STRIP: NEGATIVE
LEUKOCYTE ESTERASE UR QL STRIP: NEGATIVE
LYMPHOCYTES # BLD AUTO: 1.02 X10(3)/MCL (ref 0.6–4.6)
LYMPHOCYTES NFR BLD AUTO: 12.8 %
MCH RBC QN AUTO: 27.7 PG (ref 27–31)
MCHC RBC AUTO-ENTMCNC: 33.2 G/DL (ref 33–36)
MCV RBC AUTO: 83.4 FL (ref 80–94)
MONOCYTES # BLD AUTO: 0.95 X10(3)/MCL (ref 0.1–1.3)
MONOCYTES NFR BLD AUTO: 12 %
MUCOUS THREADS URNS QL MICRO: ABNORMAL /LPF
NEUTROPHILS # BLD AUTO: 5.71 X10(3)/MCL (ref 2.1–9.2)
NEUTROPHILS NFR BLD AUTO: 71.9 %
NITRITE UR QL STRIP: NEGATIVE
NRBC BLD AUTO-RTO: 0 %
OHS QRS DURATION: 144 MS
OHS QTC CALCULATION: 500 MS
PH UR STRIP: 5.5 [PH]
PLATELET # BLD AUTO: 361 X10(3)/MCL (ref 130–400)
PMV BLD AUTO: 10.3 FL (ref 7.4–10.4)
POCT GLUCOSE: 104 MG/DL (ref 70–110)
POCT GLUCOSE: 125 MG/DL (ref 70–110)
POTASSIUM SERPL-SCNC: 3.9 MMOL/L (ref 3.5–5.1)
PROT SERPL-MCNC: 7.9 GM/DL (ref 5.8–7.6)
PROT UR QL STRIP: ABNORMAL
RBC # BLD AUTO: 4.29 X10(6)/MCL (ref 4.7–6.1)
RBC #/AREA URNS AUTO: ABNORMAL /HPF
SODIUM SERPL-SCNC: 127 MMOL/L (ref 136–145)
SP GR UR STRIP.AUTO: 1.03 (ref 1–1.03)
SQUAMOUS #/AREA URNS LPF: ABNORMAL /HPF
TROPONIN I SERPL-MCNC: <0.01 NG/ML (ref 0–0.04)
UROBILINOGEN UR STRIP-ACNC: 8
WBC # BLD AUTO: 7.94 X10(3)/MCL (ref 4.5–11.5)
WBC #/AREA URNS AUTO: ABNORMAL /HPF

## 2025-01-19 PROCEDURE — 93010 ELECTROCARDIOGRAM REPORT: CPT | Mod: ,,, | Performed by: INTERNAL MEDICINE

## 2025-01-19 PROCEDURE — 85025 COMPLETE CBC W/AUTO DIFF WBC: CPT | Performed by: INTERNAL MEDICINE

## 2025-01-19 PROCEDURE — 25000242 PHARM REV CODE 250 ALT 637 W/ HCPCS: Performed by: INTERNAL MEDICINE

## 2025-01-19 PROCEDURE — 63600175 PHARM REV CODE 636 W HCPCS: Performed by: INTERNAL MEDICINE

## 2025-01-19 PROCEDURE — 86140 C-REACTIVE PROTEIN: CPT | Performed by: INTERNAL MEDICINE

## 2025-01-19 PROCEDURE — 25000003 PHARM REV CODE 250: Performed by: INTERNAL MEDICINE

## 2025-01-19 PROCEDURE — 36415 COLL VENOUS BLD VENIPUNCTURE: CPT | Performed by: INTERNAL MEDICINE

## 2025-01-19 PROCEDURE — 80053 COMPREHEN METABOLIC PANEL: CPT | Performed by: INTERNAL MEDICINE

## 2025-01-19 PROCEDURE — 21400001 HC TELEMETRY ROOM

## 2025-01-19 PROCEDURE — 25000003 PHARM REV CODE 250: Performed by: EMERGENCY MEDICINE

## 2025-01-19 PROCEDURE — 81001 URINALYSIS AUTO W/SCOPE: CPT | Performed by: NURSE PRACTITIONER

## 2025-01-19 PROCEDURE — 63600175 PHARM REV CODE 636 W HCPCS: Performed by: EMERGENCY MEDICINE

## 2025-01-19 PROCEDURE — 93005 ELECTROCARDIOGRAM TRACING: CPT

## 2025-01-19 RX ORDER — FLUTICASONE PROPIONATE 50 MCG
1 SPRAY, SUSPENSION (ML) NASAL 2 TIMES DAILY
Status: DISCONTINUED | OUTPATIENT
Start: 2025-01-19 | End: 2025-01-24 | Stop reason: HOSPADM

## 2025-01-19 RX ORDER — NAPROXEN 500 MG/1
500 TABLET ORAL 2 TIMES DAILY
Status: ON HOLD | COMMUNITY
End: 2025-01-20 | Stop reason: HOSPADM

## 2025-01-19 RX ORDER — IBUPROFEN 600 MG/1
600 TABLET ORAL EVERY 6 HOURS
Status: DISCONTINUED | OUTPATIENT
Start: 2025-01-20 | End: 2025-01-24 | Stop reason: HOSPADM

## 2025-01-19 RX ORDER — CEFTRIAXONE 1 G/1
1 INJECTION, POWDER, FOR SOLUTION INTRAMUSCULAR; INTRAVENOUS
Status: COMPLETED | OUTPATIENT
Start: 2025-01-19 | End: 2025-01-19

## 2025-01-19 RX ORDER — HYDROCHLOROTHIAZIDE 25 MG/1
25 TABLET ORAL DAILY
Status: DISCONTINUED | OUTPATIENT
Start: 2025-01-20 | End: 2025-01-24 | Stop reason: HOSPADM

## 2025-01-19 RX ORDER — SODIUM CHLORIDE 0.9 % (FLUSH) 0.9 %
10 SYRINGE (ML) INJECTION
Status: DISCONTINUED | OUTPATIENT
Start: 2025-01-19 | End: 2025-01-24 | Stop reason: HOSPADM

## 2025-01-19 RX ORDER — CETIRIZINE HYDROCHLORIDE 10 MG/1
10 TABLET ORAL DAILY
Status: DISCONTINUED | OUTPATIENT
Start: 2025-01-20 | End: 2025-01-24 | Stop reason: HOSPADM

## 2025-01-19 RX ORDER — ONDANSETRON HYDROCHLORIDE 2 MG/ML
4 INJECTION, SOLUTION INTRAVENOUS EVERY 8 HOURS PRN
Status: DISCONTINUED | OUTPATIENT
Start: 2025-01-19 | End: 2025-01-24 | Stop reason: HOSPADM

## 2025-01-19 RX ORDER — LISINOPRIL 20 MG/1
20 TABLET ORAL DAILY
Status: DISCONTINUED | OUTPATIENT
Start: 2025-01-20 | End: 2025-01-24 | Stop reason: HOSPADM

## 2025-01-19 RX ORDER — LISINOPRIL AND HYDROCHLOROTHIAZIDE 20; 25 MG/1; MG/1
1 TABLET ORAL DAILY
Status: DISCONTINUED | OUTPATIENT
Start: 2025-01-20 | End: 2025-01-19 | Stop reason: CLARIF

## 2025-01-19 RX ORDER — ZOLPIDEM TARTRATE 5 MG/1
10 TABLET ORAL NIGHTLY PRN
Status: DISCONTINUED | OUTPATIENT
Start: 2025-01-19 | End: 2025-01-24 | Stop reason: HOSPADM

## 2025-01-19 RX ORDER — FLUTICASONE FUROATE AND VILANTEROL 100; 25 UG/1; UG/1
1 POWDER RESPIRATORY (INHALATION) DAILY
Status: DISCONTINUED | OUTPATIENT
Start: 2025-01-20 | End: 2025-01-24 | Stop reason: HOSPADM

## 2025-01-19 RX ORDER — ALBUTEROL SULFATE 90 UG/1
2 INHALANT RESPIRATORY (INHALATION) EVERY 4 HOURS PRN
Status: DISCONTINUED | OUTPATIENT
Start: 2025-01-19 | End: 2025-01-24 | Stop reason: HOSPADM

## 2025-01-19 RX ORDER — FUROSEMIDE 20 MG/1
20 TABLET ORAL DAILY
Status: DISCONTINUED | OUTPATIENT
Start: 2025-01-20 | End: 2025-01-24 | Stop reason: HOSPADM

## 2025-01-19 RX ORDER — ZOLPIDEM TARTRATE 10 MG/1
5 TABLET ORAL NIGHTLY PRN
COMMUNITY

## 2025-01-19 RX ORDER — POTASSIUM CHLORIDE 750 MG/1
20 TABLET, EXTENDED RELEASE ORAL ONCE
COMMUNITY

## 2025-01-19 RX ORDER — LACTULOSE 10 G/15ML
10 SOLUTION ORAL DAILY
Status: DISCONTINUED | OUTPATIENT
Start: 2025-01-20 | End: 2025-01-24 | Stop reason: HOSPADM

## 2025-01-19 RX ORDER — HYDROCODONE BITARTRATE AND ACETAMINOPHEN 7.5; 325 MG/1; MG/1
1 TABLET ORAL EVERY 4 HOURS PRN
Status: DISCONTINUED | OUTPATIENT
Start: 2025-01-19 | End: 2025-01-24 | Stop reason: HOSPADM

## 2025-01-19 RX ORDER — TALC
6 POWDER (GRAM) TOPICAL NIGHTLY PRN
Status: DISCONTINUED | OUTPATIENT
Start: 2025-01-19 | End: 2025-01-24 | Stop reason: HOSPADM

## 2025-01-19 RX ORDER — GABAPENTIN 300 MG/1
300 CAPSULE ORAL 3 TIMES DAILY
Status: DISCONTINUED | OUTPATIENT
Start: 2025-01-19 | End: 2025-01-24 | Stop reason: HOSPADM

## 2025-01-19 RX ORDER — PANTOPRAZOLE SODIUM 40 MG/1
40 TABLET, DELAYED RELEASE ORAL DAILY
Status: DISCONTINUED | OUTPATIENT
Start: 2025-01-20 | End: 2025-01-24 | Stop reason: HOSPADM

## 2025-01-19 RX ORDER — BUDESONIDE 3 MG/1
3 CAPSULE, COATED PELLETS ORAL 2 TIMES DAILY
COMMUNITY

## 2025-01-19 RX ORDER — POTASSIUM CHLORIDE 20 MEQ/1
20 TABLET, EXTENDED RELEASE ORAL ONCE
Status: COMPLETED | OUTPATIENT
Start: 2025-01-19 | End: 2025-01-19

## 2025-01-19 RX ADMIN — HYDROCODONE BITARTRATE AND ACETAMINOPHEN 1 TABLET: 7.5; 325 TABLET ORAL at 09:01

## 2025-01-19 RX ADMIN — GABAPENTIN 300 MG: 300 CAPSULE ORAL at 09:01

## 2025-01-19 RX ADMIN — HYDROCODONE BITARTRATE AND ACETAMINOPHEN 1 TABLET: 7.5; 325 TABLET ORAL at 05:01

## 2025-01-19 RX ADMIN — ZOLPIDEM TARTRATE 10 MG: 5 TABLET, FILM COATED ORAL at 10:01

## 2025-01-19 RX ADMIN — HYPROMELLOSE 2910 2 DROP: 5 SOLUTION/ DROPS OPHTHALMIC at 09:01

## 2025-01-19 RX ADMIN — POTASSIUM CHLORIDE 20 MEQ: 1500 TABLET, EXTENDED RELEASE ORAL at 09:01

## 2025-01-19 RX ADMIN — TRAZODONE HYDROCHLORIDE 25 MG: 50 TABLET ORAL at 09:01

## 2025-01-19 RX ADMIN — FLUTICASONE PROPIONATE 50 MCG: 50 SPRAY, METERED NASAL at 09:01

## 2025-01-19 RX ADMIN — CEFTRIAXONE SODIUM 1 G: 1 INJECTION, POWDER, FOR SOLUTION INTRAMUSCULAR; INTRAVENOUS at 05:01

## 2025-01-19 RX ADMIN — HYDROCODONE BITARTRATE AND ACETAMINOPHEN 1 TABLET: 7.5; 325 TABLET ORAL at 01:01

## 2025-01-19 RX ADMIN — AZITHROMYCIN MONOHYDRATE 500 MG: 500 INJECTION, POWDER, LYOPHILIZED, FOR SOLUTION INTRAVENOUS at 05:01

## 2025-01-19 NOTE — FIRST PROVIDER EVALUATION
Medical screening examination initiated.  I have conducted a focused provider triage encounter, findings are as follows:    Brief history of present illness:  63 y/o male presents with 4 days of back, knees, shoulders hurting. States hx rheumatoid arthritis and feels like a flare up. Taking diclofenac at home without relief.     There were no vitals filed for this visit.    Pertinent physical exam:  alert, in wheel chair, nonlabored.     Brief workup plan:  medications    Preliminary workup initiated; this workup will be continued and followed by the physician or advanced practice provider that is assigned to the patient when roomed.

## 2025-01-19 NOTE — PLAN OF CARE
Problem: Adult Inpatient Plan of Care  Goal: Plan of Care Review  Outcome: Progressing  Goal: Patient-Specific Goal (Individualized)  Outcome: Progressing  Goal: Absence of Hospital-Acquired Illness or Injury  Outcome: Progressing  Goal: Optimal Comfort and Wellbeing  Outcome: Progressing  Goal: Readiness for Transition of Care  Outcome: Progressing     Problem: Pneumonia  Goal: Fluid Balance  Outcome: Progressing  Goal: Resolution of Infection Signs and Symptoms  Outcome: Progressing  Goal: Effective Oxygenation and Ventilation  Outcome: Progressing     Problem: Diabetes Comorbidity  Goal: Blood Glucose Level Within Targeted Range  Outcome: Progressing

## 2025-01-19 NOTE — ED PROVIDER NOTES
Encounter Date: 1/18/2025       History     Chief Complaint   Patient presents with    Back Pain     Back, neck, knee, wrist pain. Hx rheumatoid arthritis. Denies meds.      See MDM    The history is provided by the patient. No  was used.     Review of patient's allergies indicates:  No Known Allergies  Past Medical History:   Diagnosis Date    ADELA positive     Antisynthetase syndrome     Diabetes mellitus     GERD (gastroesophageal reflux disease)     Herpes zoster     Hypertension     ILD (interstitial lung disease)     Lymphadenopathy     Morbid obesity     Osteoarthritis of right knee     Rheumatoid arthritis     Snoring     Supraclavicular mass     Thyroid nodule     Venous insufficiency of lower extremity      Past Surgical History:   Procedure Laterality Date    Drainage of right thyroid gland lobe  10/07/2021    Laser surgery left leg Left     Right hand surgery       Family History   Problem Relation Name Age of Onset    Heart disease Mother      Breast cancer Mother      Heart attack Mother       Social History     Tobacco Use    Smoking status: Every Day     Current packs/day: 0.25     Types: Cigarettes    Smokeless tobacco: Never    Tobacco comments:     Only about 2 cigarettes a day    Substance Use Topics    Alcohol use: Yes     Comment: One beer a month    Drug use: Yes     Types: Marijuana     Comment: Occasionally     Review of Systems   Musculoskeletal:  Positive for joint swelling.   All other systems reviewed and are negative.      Physical Exam     Initial Vitals [01/18/25 1943]   BP Pulse Resp Temp SpO2   129/87 (!) 120 (!) 22 97.8 °F (36.6 °C) 95 %      MAP       --         Physical Exam    Nursing note and vitals reviewed.  Constitutional: He appears well-developed.   Appears to not feel well   Cardiovascular:  Normal rate and intact distal pulses.           Pulmonary/Chest: No respiratory distress.   Abdominal:   obese   Musculoskeletal:      Right shoulder: Tenderness  present. Decreased range of motion.      Left shoulder: Tenderness present. Decreased range of motion.      Lumbar back: Tenderness present. No bony tenderness.      Right knee: Swelling present. Decreased range of motion. Tenderness present.      Left knee: No swelling. Decreased range of motion. Tenderness present.      Comments: He has limited ROM, uses cane but at this time states pain is so severe to walk how he normally does. He can transfer from wheel chair to chair      Neurological: He is alert and oriented to person, place, and time.   Skin: Skin is warm and dry.   Psychiatric: He has a normal mood and affect.         ED Course   Procedures  Labs Reviewed   CBC WITH DIFFERENTIAL - Abnormal       Result Value    WBC 9.62      RBC 4.24 (*)     Hgb 11.8 (*)     Hct 34.2 (*)     MCV 80.7      MCH 27.8      MCHC 34.5      RDW 14.6      Platelet 361      MPV 10.5 (*)     Neut % 75.6      Lymph % 12.7      Mono % 9.9      Eos % 0.8      Basophil % 0.4      Imm Grans % 0.6      Neut # 7.27      Lymph # 1.22      Mono # 0.95      Eos # 0.08      Baso # 0.04      Imm Gran # 0.06 (*)     NRBC% 0.0     COMPREHENSIVE METABOLIC PANEL - Abnormal    Sodium 127 (*)     Potassium 3.4 (*)     Chloride 93 (*)     CO2 23      Glucose 108      Blood Urea Nitrogen 15.3      Creatinine 0.84      Calcium 8.6 (*)     Protein Total 7.5      Albumin 2.8 (*)     Globulin 4.7 (*)     Albumin/Globulin Ratio 0.6 (*)     Bilirubin Total 0.7            ALT 8      AST 13      eGFR >60      Anion Gap 11.0      BUN/Creatinine Ratio 18     URINALYSIS, REFLEX TO URINE CULTURE - Abnormal    Color, UA Yellow      Appearance, UA Clear      Specific Gravity, UA 1.027      pH, UA 5.5      Protein, UA Trace (*)     Glucose, UA Normal      Ketones, UA Negative      Blood, UA Negative      Bilirubin, UA Negative      Urobilinogen, UA 8.0 (*)     Nitrites, UA Negative      Leukocyte Esterase, UA Negative      RBC, UA 0-5      WBC, UA 0-5       Bacteria, UA None Seen      Squamous Epithelial Cells, UA None Seen      Mucous, UA Trace (*)    ISTAT CHEM8 - Abnormal    POC Glucose 114 (*)     POC BUN 16      POC Creatinine 1.0      POC Sodium 129 (*)     POC Potassium 3.3 (*)     POC Chloride 91 (*)     POC TCO2 (MEASURED) 27      POC Anion Gap 15      POC Ionized Calcium 1.06      POC Hematocrit 37      POC HEMOGLOBIN 13      Sample VENOUS     MAGNESIUM - Normal    Magnesium Level 1.70     TSH - Normal    TSH 0.802     CK - Normal    Creatine Kinase 97     COVID/RSV/FLU A&B PCR - Normal    Influenza A PCR Not Detected      Influenza B PCR Not Detected      Respiratory Syncytial Virus PCR Not Detected      SARS-CoV-2 PCR Not Detected      Narrative:     The Xpert Xpress SARS-CoV-2/FLU/RSV plus is a rapid, multiplexed real-time PCR test intended for the simultaneous qualitative detection and differentiation of SARS-CoV-2, Influenza A, Influenza B, and respiratory syncytial virus (RSV) viral RNA in either nasopharyngeal swab or nasal swab specimens.         TROPONIN I - Normal    Troponin-I <0.010     B-TYPE NATRIURETIC PEPTIDE - Normal    Natriuretic Peptide 18.8     POCT GLUCOSE, HAND-HELD DEVICE   POCT CHEMISTRY PANEL          Imaging Results              CT Chest Without Contrast (Preliminary result)  Result time 01/19/25 01:19:49      Preliminary result by Rigo Pichardo MD (01/19/25 01:19:49)                   Narrative:    START OF REPORT:  Technique: CT Scan of the chest was performed without intravenous contrast with direct axial as well as sagittal and, coronal, reconstruction images.    Dosage Information: Automated Exposure Control was utilized.    Comparison: None.    Clinical History: Sob.    Findings:  Artifact: Moderate motion artifact is seen on multiple images throughout the CT scan.  Soft Tissues: Unremarkable.  Neck: The right thyroid gland appear unremarkable. A 1.4 x 2.4 cm heterogenous nodule is seen in the left thyroid lobe. Correlate with  clinical and laboratory findings as regards to additional evaluation and follow-up.  Mediastinum: The mediastinal structures are within normal limits. Prominent para-aortic and subcarinal lymph nodes are seen. The largest is in the subcarinal region measuring 1.3 cm in its short axis on Image 40 Series 3.  Heart: The heart appears unremarkable.  Aorta: Mild aortic calcification is seen in the thoracic aorta.  Pulmonary Arteries: Unremarkable.  Lungs: Diffuse reticular densities with associated intralobular septal thickening is noted with subpleural predominance. Subtle honeycombing is noted in the posterior basal segments of both lungs. These findings may reflect an interstitial lung disease such as non-specific interstitial pneumonia or usual interstitial pneumonia.  Pleura: No effusions or pneumothorax are identified.  Bony Structures:  Spine: Mild multilevel spondylolytic changes are seen in the thoracic spine.  Ribs: The ribs appear unremarkable.  Abdomen: The visualized upper abdominal organs appear unremarkable.      Impression:  1. Diffuse reticular densities with associated intralobular septal thickening is noted with subpleural predominance. Subtle honeycombing is noted in the posterior basal segments of both lungs. These findings may reflect an interstitial lung disease such as non-specific interstitial pneumonia or usual interstitial pneumonia. Correlate with clinical and laboratory findings as regards to additional evaluation and follow-up.  2. Details and other findings as discussed above.                                         X-Ray Chest 1 View (Final result)  Result time 01/18/25 22:02:17      Final result by Darren Shah MD (01/18/25 22:02:17)                   Impression:      As above.      Electronically signed by: Darren Shah  Date:    01/18/2025  Time:    22:02               Narrative:    EXAMINATION:  XR CHEST 1 VIEW    CLINICAL HISTORY:  bodyaches;    TECHNIQUE:  One  view    COMPARISON:  December 18, 2024.    FINDINGS:  Cardiopericardial silhouette is within normal limits.  Lungs are remarkable for extensive chronic interstitial changes of the lungs with relative sparing of the left upper lung lobe.  Mild superimposed congestive process is difficult to exclude.  No focally dense consolidation.  There appears small pleural effusions.  No pneumothorax                                       Medications   sodium chloride 0.9% flush 10 mL (has no administration in time range)   melatonin tablet 6 mg (has no administration in time range)   ondansetron injection 4 mg (has no administration in time range)   cefTRIAXone injection 1 g (has no administration in time range)   azithromycin (ZITHROMAX) 500 mg in 0.9% NaCl 250 mL IVPB (admixture device) (has no administration in time range)   trazodone split tablet 25 mg (has no administration in time range)   ketorolac injection 30 mg (30 mg Intramuscular Given 1/18/25 2037)   HYDROcodone-acetaminophen 5-325 mg per tablet 1 tablet (1 tablet Oral Given 1/18/25 2037)   0.9% NaCl infusion (0 mLs Intravenous Stopped 1/18/25 2258)     Medical Decision Making  64-year-old male presents with 4 days of diffuse joint pain which he states feels like his normal rheumatoid arthritis flare up.  He is having pain in bilateral shoulders bilateral knees with right being worse than left and his back.  He is taking over-the-counter medicine without relief.  He denies any nausea vomiting.  Slight decrease in appetite.    Glucose 125 here. Chem 8 shows slightly low K, Na, Cl. He seems to not feel well so will extend my work up with labs, swab and xray chest     Amount and/or Complexity of Data Reviewed  Labs: ordered.  Radiology: ordered.  Discussion of management or test interpretation with external provider(s): Transitioned care to [facility]    Risk  OTC drugs.  Prescription drug management.  Decision regarding hospitalization.      Additional MDM:    Differential Diagnosis:   Other: The following diagnoses were also considered and will be evaluated: rheumatoid arthritis, dehydration and covid.            ED Course as of 01/19/25 6080   Sun Jan 19, 2025   3743 Sarabjit Beebe MD [CHRIS]   6523 Attestation:     Ja Everett dictating I saw this patient along with the AP P and had face-to-face time with the patient and performed an independent history and physical examination and performed a substantial portion of this patient encounter.    This is a 64-year-old male who has rheumatoid arthritis who presented today complaining of pain in his shoulders hip and lower back which is typically where he has a his rheumatoid arthritis pain.  It was noted during his hospitalization that he had an oxygen requirement and was somewhat short of breath and had a productive cough.      Patient says that he has some history of lung disease he can not recall with the diagnosis was but he was on oxygen for a period of time before but his doctor took him off of it.    Patient's CT scan shows evidence of some diffuse atypical pneumonia type pattern.  He is requiring oxygen so he will have to be admitted to the hospital I did page his primary care doctor Dr. Sarabjit Castaneda. [NL]   3138 I spoke with Dr. Raymond bates on who is on-call for facial trauma he says the patient can follow up in the clinic to put the patient on clinda and Peridex mouthwash soft diet can see on Monday or Wednesday. [NL]   6910 Disregard prior ED course note entered in error [NL]      ED Course User Index  [CHRIS] eGo Cooper  [NL] Ja Everett MD                           Clinical Impression:  Final diagnoses:  [R06.02] Shortness of breath  [J18.9] Pneumonia due to infectious organism, unspecified laterality, unspecified part of lung          ED Disposition Condition    Admit Stable                Ja Everett MD  01/19/25 0130       Ja Everett MD  01/19/25 3585        Ja Everett MD  01/19/25 0451

## 2025-01-20 PROBLEM — R06.02 SHORTNESS OF BREATH: Status: RESOLVED | Noted: 2025-01-19 | Resolved: 2025-01-20

## 2025-01-20 PROBLEM — R59.9 ADENOPATHY: Status: RESOLVED | Noted: 2023-01-03 | Resolved: 2025-01-20

## 2025-01-20 PROCEDURE — 25000242 PHARM REV CODE 250 ALT 637 W/ HCPCS: Performed by: INTERNAL MEDICINE

## 2025-01-20 PROCEDURE — 25000003 PHARM REV CODE 250: Performed by: EMERGENCY MEDICINE

## 2025-01-20 PROCEDURE — 27000221 HC OXYGEN, UP TO 24 HOURS

## 2025-01-20 PROCEDURE — 25000003 PHARM REV CODE 250: Performed by: INTERNAL MEDICINE

## 2025-01-20 PROCEDURE — 21400001 HC TELEMETRY ROOM

## 2025-01-20 RX ADMIN — LISINOPRIL 20 MG: 20 TABLET ORAL at 09:01

## 2025-01-20 RX ADMIN — FLUTICASONE PROPIONATE 50 MCG: 50 SPRAY, METERED NASAL at 09:01

## 2025-01-20 RX ADMIN — GABAPENTIN 300 MG: 300 CAPSULE ORAL at 02:01

## 2025-01-20 RX ADMIN — FLUTICASONE FUROATE AND VILANTEROL TRIFENATATE 1 PUFF: 100; 25 POWDER RESPIRATORY (INHALATION) at 09:01

## 2025-01-20 RX ADMIN — LACTULOSE 10 G: 10 SOLUTION ORAL at 09:01

## 2025-01-20 RX ADMIN — FUROSEMIDE 20 MG: 20 TABLET ORAL at 05:01

## 2025-01-20 RX ADMIN — GABAPENTIN 300 MG: 300 CAPSULE ORAL at 09:01

## 2025-01-20 RX ADMIN — HYPROMELLOSE 2910 2 DROP: 5 SOLUTION/ DROPS OPHTHALMIC at 02:01

## 2025-01-20 RX ADMIN — IBUPROFEN 600 MG: 600 TABLET ORAL at 11:01

## 2025-01-20 RX ADMIN — HYDROCODONE BITARTRATE AND ACETAMINOPHEN 1 TABLET: 7.5; 325 TABLET ORAL at 09:01

## 2025-01-20 RX ADMIN — HYPROMELLOSE 2910 2 DROP: 5 SOLUTION/ DROPS OPHTHALMIC at 05:01

## 2025-01-20 RX ADMIN — HYPROMELLOSE 2910 2 DROP: 5 SOLUTION/ DROPS OPHTHALMIC at 09:01

## 2025-01-20 RX ADMIN — CETIRIZINE HYDROCHLORIDE 10 MG: 10 TABLET, FILM COATED ORAL at 09:01

## 2025-01-20 RX ADMIN — IBUPROFEN 600 MG: 600 TABLET ORAL at 06:01

## 2025-01-20 RX ADMIN — IBUPROFEN 600 MG: 600 TABLET ORAL at 12:01

## 2025-01-20 RX ADMIN — PANTOPRAZOLE SODIUM 40 MG: 40 TABLET, DELAYED RELEASE ORAL at 09:01

## 2025-01-20 RX ADMIN — TRAZODONE HYDROCHLORIDE 25 MG: 50 TABLET ORAL at 09:01

## 2025-01-20 RX ADMIN — IBUPROFEN 600 MG: 600 TABLET ORAL at 05:01

## 2025-01-20 RX ADMIN — HYPROMELLOSE 2910 2 DROP: 5 SOLUTION/ DROPS OPHTHALMIC at 06:01

## 2025-01-20 RX ADMIN — HYDROCHLOROTHIAZIDE 25 MG: 25 TABLET ORAL at 09:01

## 2025-01-20 NOTE — H&P
[facility]                       [address] [address]    PATIENT NAME:       ЮЛИЯ NUNEZ   YOB: 1960  CSN:                [id number]   MRN:                [id number]  ADMIT DATE:         01/18/2025 19:46:00  PHYSICIAN:          Sarabjit Arreaga MD                        HISTORY AND PHYSICAL      CHIEF COMPLAINT:  Shortness of breath and generalized pain in hip and back,   shoulder and knees.    HISTORY OF PRESENT ILLNESS:  The patient is a 64-year-old  male,   who stated that he has been having some shortness of breath for the past 2   months but increasingly getting worse and he stated he was feeling that he was   wheezing and also came to hospital because of pain.  Therefore, he was seen by   the physician, who did the preliminary study and think the patient at this time   was in need to be admitted and also to control his pain.    PAST MEDICAL HISTORY:  Significant for hypertension and also diabetes mellitus   and osteoarthritis, rheumatoid arthritis, thyroid nodule and also history of   COPD, mostly chronic interstitial lung disease also and sinusitis, morbid   obesity and also appeared to have lymphadenopathy.  Venous insufficiency of the   lower extremity.    SOCIAL HISTORY:  The patient is single, has 2 children, 1 boy, 1 girl in school,   completed 11th grade.  He does not drink but smoke 1 pack cigarettes a day.    Denies history of IV drug abuse.    FAMILY HISTORY:  Significant for diabetes mellitus, hypertension, and cancer.    No heart problem.    SURGICAL HISTORY:  The patient had varicose vein surgery in the left leg and   also has hand surgery bilaterally in both hands.    ALLERGIES:  NO KNOWN ALLERGIES.     CURRENT MEDICATIONS:  The patient is taking Advair HFA two puffs in the lungs   once daily, albuterol Proventil 2 puffs q.4 hours p.r.n. and takes aspirin 81 mg   1  daily, taking Lasix 20 mg a day and potassium 10 mEq daily, taking lisinopril   hydrochlorothiazide 20/25 one tablet daily, omeprazole 40 mg once a day, and   zolpidem 10 mg at night .  Restasis 0.05% ophthalmic 1 drop in both eyes   twice a day and taking at home tramadol 50 mg q.12 hours p.r.n.Gabapentin 200 mg 3 times a day.    REVIEW OF SYSTEMS:  CARDIOVASCULAR SYSTEM:  Negative for chest pain.  RESPIRATORY SYSTEM:  Shortness of breath.  GASTROINTESTINAL:  No diarrhea or vomiting.  ENDOCRINOLOGIC SYSTEM:  Diabetes mellitus.  No thyroid disorder.  NEUROLOGICAL SYSTEM:  No focal neurological deficit.    PHYSICAL EXAM:  VITAL SIGNS:  At the time I had seen the patient, he had a blood   pressure of 136/90 today, pulse of 120, respirations 29, temperature 97.8.  HEAD:  Normocephalic with no acute trauma to the head.    EYES:  Both eyes, pupils reactive to light, accommodation satisfactory.  EARS, NOSE AND THROAT:  No current pathology.  NECK:  Supple.  No JVD or adenopathy.  HEART:  The patient has S1, S2.  No murmur or gallop.  CHEST:  Bilateral wheezing.  ABDOMEN:  Soft, nontender.  Good bowel sounds.  EXTREMITIES:  Superior and inferior limitation with normal function secondary to   pain but no edema of the lower extremities.  No clubbing or cyanosis.  NEUROLOGICAL:  No focal neurological deficit.  Cranial nerves 2 through 12   intact.    LABORATORY DATA:  Patient has sodium 127, potassium 3.4, hemoglobin 11.8,   hematocrit 34.2, BUN 15.3, creatinine 0.84, albumin 2.8.    Chest x-ray revealed some chronic interstitial disease.   Impression:  PLAN:  We admitted the patient and hopefully that we can control his pain and   his shortness of breath.  We may have to consult Pulmonology concerning   interstitial lung disease.        ______________________________  MD ISSAC Villatoro/RASTA  DD:  01/19/2025  Time:  07:31PM  DT:  01/19/2025  Time:  08:35PM  Job #:  [id number]      HISTORY AND PHYSICAL

## 2025-01-21 LAB — CRP SERPL-MCNC: 121.2 MG/L

## 2025-01-21 PROCEDURE — 86200 CCP ANTIBODY: CPT | Performed by: INTERNAL MEDICINE

## 2025-01-21 PROCEDURE — 36415 COLL VENOUS BLD VENIPUNCTURE: CPT | Performed by: INTERNAL MEDICINE

## 2025-01-21 PROCEDURE — 25000003 PHARM REV CODE 250: Performed by: INTERNAL MEDICINE

## 2025-01-21 PROCEDURE — 25000003 PHARM REV CODE 250: Performed by: EMERGENCY MEDICINE

## 2025-01-21 PROCEDURE — 21400001 HC TELEMETRY ROOM

## 2025-01-21 RX ADMIN — IBUPROFEN 600 MG: 600 TABLET ORAL at 04:01

## 2025-01-21 RX ADMIN — IBUPROFEN 600 MG: 600 TABLET ORAL at 11:01

## 2025-01-21 RX ADMIN — CETIRIZINE HYDROCHLORIDE 10 MG: 10 TABLET, FILM COATED ORAL at 08:01

## 2025-01-21 RX ADMIN — LISINOPRIL 20 MG: 20 TABLET ORAL at 08:01

## 2025-01-21 RX ADMIN — GABAPENTIN 300 MG: 300 CAPSULE ORAL at 09:01

## 2025-01-21 RX ADMIN — HYDROCHLOROTHIAZIDE 25 MG: 25 TABLET ORAL at 08:01

## 2025-01-21 RX ADMIN — HYPROMELLOSE 2910 2 DROP: 5 SOLUTION/ DROPS OPHTHALMIC at 05:01

## 2025-01-21 RX ADMIN — TRAZODONE HYDROCHLORIDE 25 MG: 50 TABLET ORAL at 09:01

## 2025-01-21 RX ADMIN — HYDROCODONE BITARTRATE AND ACETAMINOPHEN 1 TABLET: 7.5; 325 TABLET ORAL at 06:01

## 2025-01-21 RX ADMIN — GABAPENTIN 300 MG: 300 CAPSULE ORAL at 04:01

## 2025-01-21 RX ADMIN — HYDROCODONE BITARTRATE AND ACETAMINOPHEN 1 TABLET: 7.5; 325 TABLET ORAL at 08:01

## 2025-01-21 RX ADMIN — HYPROMELLOSE 2910 2 DROP: 5 SOLUTION/ DROPS OPHTHALMIC at 09:01

## 2025-01-21 RX ADMIN — FLUTICASONE PROPIONATE 50 MCG: 50 SPRAY, METERED NASAL at 09:01

## 2025-01-21 RX ADMIN — LACTULOSE 10 G: 10 SOLUTION ORAL at 08:01

## 2025-01-21 RX ADMIN — FUROSEMIDE 20 MG: 20 TABLET ORAL at 04:01

## 2025-01-21 RX ADMIN — PANTOPRAZOLE SODIUM 40 MG: 40 TABLET, DELAYED RELEASE ORAL at 08:01

## 2025-01-21 RX ADMIN — GABAPENTIN 300 MG: 300 CAPSULE ORAL at 08:01

## 2025-01-21 RX ADMIN — IBUPROFEN 600 MG: 600 TABLET ORAL at 05:01

## 2025-01-21 NOTE — PLAN OF CARE
01/21/25 1505   Discharge Assessment   Assessment Type Discharge Planning Assessment   Confirmed/corrected address, phone number and insurance Yes   Confirmed Demographics Correct on Facesheet   Source of Information family  (Daughter: Lindsay Gtz ([phone]).)   If unable to respond/provide information was family/caregiver contacted? Yes   Contact Name/Number Daughter: Lindsay Meng: [phone].   Communicated DAISY with patient/caregiver Date not available/Unable to determine   Reason For Admission Chief Complaint: Back Pain; Neck and Wrist Pain. Hx: Rheumatoid Arthritis.   People in Home child(denise), adult  (The patient lives with his adult Daughter: Lindsay Meng: [phone].)   Facility Arrived From: Private residence: [place], [place].   Do you expect to return to your current living situation? Yes   Do you have help at home or someone to help you manage your care at home? Yes   Who are your caregiver(s) and their phone number(s)? Daughter: Lindsay Meng: [phone].   Prior to hospitilization cognitive status: Alert/Oriented   Current cognitive status: Alert/Oriented   Walking or Climbing Stairs Difficulty yes   Walking or Climbing Stairs ambulation difficulty, requires equipment   Mobility Management The patient uses the following DME: 1. Rolling Walker 2. Wheel Chair (Broken) 3. Quad & Straight Canes 4. Shower Chair.   Dressing/Bathing Difficulty yes   Dressing/Bathing bathing difficulty, requires equipment   Dressing/Bathing Management The patient uses the following DME for hygiene (Bathing/Showering) needs: 1. Shower Chair.   Home Accessibility wheelchair accessible  (The patient's daughter's home is built on a slab.)   Home Layout Able to live on 1st floor   Equipment Currently Used at Home cane, quad;cane, straight;walker, rolling;shower chair;wheelchair   Readmission within 30 days? No   Patient currently being followed by outpatient case management? No   Do you currently have  service(s) that help you manage your care at home? No   Do you take prescription medications? Yes   Do you have prescription coverage? Yes   Coverage [employer] P[employer].   Do you have any problems affording any of your prescribed medications? No   Is the patient taking medications as prescribed? yes   Who is going to help you get home at discharge? Daughter: Lindsay Gtz: [phone].   How do you get to doctors appointments? family or friend will provide   Are you on dialysis? No   Do you take coumadin? No   Discharge Plan A Home with family   Discharge Plan B Home with family   DME Needed Upon Discharge  wheelchair  (Note: The patient is requesting a new wheel chair as his current W/C is broken.)   Discharge Plan discussed with: Patient;Adult children  (Daughter: Lindsay Meng: [phone].)   Transition of Care Barriers None   Financial Resource Strain   How hard is it for you to pay for the very basics like food, housing, medical care, and heating? Somewhat   Housing Stability   In the last 12 months, was there a time when you were not able to pay the mortgage or rent on time? N   At any time in the past 12 months, were you homeless or living in a shelter (including now)? N   Transportation Needs   Has the lack of transportation kept you from medical appointments, meetings, work or from getting things needed for daily living? No   Food Insecurity   Within the past 12 months, you worried that your food would run out before you got the money to buy more. Never true   Within the past 12 months, the food you bought just didn't last and you didn't have money to get more. Never true   Stress   Do you feel stress - tense, restless, nervous, or anxious, or unable to sleep at night because your mind is troubled all the time - these days? Only a littl   Social Isolation   How often do you feel lonely or isolated from those around you?  Rarely   Alcohol Use   Q1: How often do you have a  drink containing alcohol? Never   Q2: How many drinks containing alcohol do you have on a typical day when you are drinking? None   Q3: How often do you have six or more drinks on one occasion? Never   Utilities   In the past 12 months has the electric, gas, oil, or water company threatened to shut off services in your home? No   Health Literacy   How often do you need to have someone help you when you read instructions, pamphlets, or other written material from your doctor or pharmacy? Never   OTHER   Name(s) of People in Home Daughter: Lindsay Meng: [phone].     Daughter: Lindsay Meng: [phone].  PCP: Dr. Sarabjit Arreaga  DME: 1. Quad & Straight Canes 2. Rolling Walker 3. Wheel Chair (Broken) 4. Shower Chair.

## 2025-01-22 PROCEDURE — 21400001 HC TELEMETRY ROOM

## 2025-01-22 PROCEDURE — 25000242 PHARM REV CODE 250 ALT 637 W/ HCPCS: Performed by: INTERNAL MEDICINE

## 2025-01-22 PROCEDURE — 25000003 PHARM REV CODE 250: Performed by: INTERNAL MEDICINE

## 2025-01-22 PROCEDURE — 63700000 PHARM REV CODE 250 ALT 637 W/O HCPCS: Performed by: INTERNAL MEDICINE

## 2025-01-22 PROCEDURE — 63600175 PHARM REV CODE 636 W HCPCS: Performed by: INTERNAL MEDICINE

## 2025-01-22 PROCEDURE — 25000003 PHARM REV CODE 250: Performed by: EMERGENCY MEDICINE

## 2025-01-22 PROCEDURE — 27000221 HC OXYGEN, UP TO 24 HOURS

## 2025-01-22 RX ORDER — AMOXICILLIN AND CLAVULANATE POTASSIUM 875; 125 MG/1; MG/1
1 TABLET, FILM COATED ORAL EVERY 12 HOURS
Status: DISCONTINUED | OUTPATIENT
Start: 2025-01-22 | End: 2025-01-24 | Stop reason: HOSPADM

## 2025-01-22 RX ORDER — AZITHROMYCIN 250 MG/1
250 TABLET, FILM COATED ORAL DAILY
Status: DISCONTINUED | OUTPATIENT
Start: 2025-01-22 | End: 2025-01-24 | Stop reason: HOSPADM

## 2025-01-22 RX ORDER — PREDNISONE 20 MG/1
40 TABLET ORAL DAILY
Status: DISCONTINUED | OUTPATIENT
Start: 2025-01-22 | End: 2025-01-24 | Stop reason: HOSPADM

## 2025-01-22 RX ADMIN — HYPROMELLOSE 2910 2 DROP: 5 SOLUTION/ DROPS OPHTHALMIC at 04:01

## 2025-01-22 RX ADMIN — LACTULOSE 10 G: 10 SOLUTION ORAL at 09:01

## 2025-01-22 RX ADMIN — HYDROCHLOROTHIAZIDE 25 MG: 25 TABLET ORAL at 09:01

## 2025-01-22 RX ADMIN — FUROSEMIDE 20 MG: 20 TABLET ORAL at 04:01

## 2025-01-22 RX ADMIN — GABAPENTIN 300 MG: 300 CAPSULE ORAL at 04:01

## 2025-01-22 RX ADMIN — FLUTICASONE PROPIONATE 50 MCG: 50 SPRAY, METERED NASAL at 09:01

## 2025-01-22 RX ADMIN — CETIRIZINE HYDROCHLORIDE 10 MG: 10 TABLET, FILM COATED ORAL at 09:01

## 2025-01-22 RX ADMIN — GABAPENTIN 300 MG: 300 CAPSULE ORAL at 09:01

## 2025-01-22 RX ADMIN — AMOXICILLIN AND CLAVULANATE POTASSIUM 1 TABLET: 875; 125 TABLET, FILM COATED ORAL at 12:01

## 2025-01-22 RX ADMIN — AZITHROMYCIN DIHYDRATE 250 MG: 250 TABLET ORAL at 12:01

## 2025-01-22 RX ADMIN — AMOXICILLIN AND CLAVULANATE POTASSIUM 1 TABLET: 875; 125 TABLET, FILM COATED ORAL at 09:01

## 2025-01-22 RX ADMIN — LISINOPRIL 20 MG: 20 TABLET ORAL at 09:01

## 2025-01-22 RX ADMIN — HYDROCODONE BITARTRATE AND ACETAMINOPHEN 1 TABLET: 7.5; 325 TABLET ORAL at 09:01

## 2025-01-22 RX ADMIN — HYDROCODONE BITARTRATE AND ACETAMINOPHEN 1 TABLET: 7.5; 325 TABLET ORAL at 02:01

## 2025-01-22 RX ADMIN — FLUTICASONE FUROATE AND VILANTEROL TRIFENATATE 1 PUFF: 100; 25 POWDER RESPIRATORY (INHALATION) at 09:01

## 2025-01-22 RX ADMIN — HYDROCODONE BITARTRATE AND ACETAMINOPHEN 1 TABLET: 7.5; 325 TABLET ORAL at 04:01

## 2025-01-22 RX ADMIN — HYPROMELLOSE 2910 2 DROP: 5 SOLUTION/ DROPS OPHTHALMIC at 09:01

## 2025-01-22 RX ADMIN — TRAZODONE HYDROCHLORIDE 25 MG: 50 TABLET ORAL at 09:01

## 2025-01-22 RX ADMIN — PREDNISONE 40 MG: 20 TABLET ORAL at 12:01

## 2025-01-22 RX ADMIN — IBUPROFEN 600 MG: 600 TABLET ORAL at 04:01

## 2025-01-22 RX ADMIN — IBUPROFEN 600 MG: 600 TABLET ORAL at 05:01

## 2025-01-22 RX ADMIN — PANTOPRAZOLE SODIUM 40 MG: 40 TABLET, DELAYED RELEASE ORAL at 09:01

## 2025-01-22 RX ADMIN — IBUPROFEN 600 MG: 600 TABLET ORAL at 12:01

## 2025-01-22 NOTE — CONSULTS
[facility] Tele[facility]  Pulmonary Critical Care Note    Patient Name: Vic Meng  MRN: [id number]  Admission Date: 1/18/2025  Hospital Length of Stay: 2 days  Code Status: Full Code  Attending Provider: Sarabjit Arreaga MD  Primary Care Provider: Sarabjit Arreaga MD     Subjective:     HPI:   Mr. Meng is a 63yo male with history of rheumatoid arthritis, interstitial lung disease who is currently admitted to the hospital medicine service for management of chronic pain.    He has a known history of RA with antisynthetase syndrome overlap, previously followed by Rheumatology but last clinic visit documented in 2023 and subsequently lost to follow up.  Treated with Ofev for progressive lung disease, ultimately transitioned to Actemra due to suspected GI intolerance of antifibrotic therapy.    He has a long-term smoking history, currently states that he has been abstinent from tobacco for approximately 2 weeks.     He states that he has had slowly progressive symptoms of dyspnea and cough with yellow sputum production over the course of the last several years.  He is overall a poor historian and does not recall many details of his prior doctor visits, but currently not taking any therapy for his rheumatoid arthritis.      24 Hour Interval History:  Pending        Past Medical History:   Diagnosis Date    ADELA positive     Antisynthetase syndrome     Diabetes mellitus     GERD (gastroesophageal reflux disease)     Herpes zoster     Hypertension     ILD (interstitial lung disease)     Lymphadenopathy     Morbid obesity     Osteoarthritis of right knee     Rheumatoid arthritis     Snoring     Supraclavicular mass     Thyroid nodule     Venous insufficiency of lower extremity        Past Surgical History:   Procedure Laterality Date    Drainage of right thyroid gland lobe  10/07/2021    Laser surgery left leg Left     Right hand surgery         Social History     Socioeconomic History     Marital status: Single   Tobacco Use    Smoking status: Every Day     Current packs/day: 0.25     Types: Cigarettes    Smokeless tobacco: Never    Tobacco comments:     Only about 2 cigarettes a day    Substance and Sexual Activity    Alcohol use: Yes     Comment: One beer a month    Drug use: Yes     Types: Marijuana     Comment: Occasionally     Social Drivers of Health     Financial Resource Strain: Medium Risk (1/21/2025)    Overall Financial Resource Strain (CARDIA)     Difficulty of Paying Living Expenses: Somewhat hard   Food Insecurity: No Food Insecurity (1/21/2025)    Hunger Vital Sign     Worried About Running Out of Food in the Last Year: Never true     Ran Out of Food in the Last Year: Never true   Transportation Needs: No Transportation Needs (1/21/2025)    TRANSPORTATION NEEDS     Transportation : No   Stress: No Stress Concern Present (1/21/2025)    [employer] - Occupational Stress Questionnaire     Feeling of Stress : Only a little   Housing Stability: Low Risk  (1/21/2025)    Housing Stability Vital Sign     Unable to Pay for Housing in the Last Year: No     Homeless in the Last Year: No       Current Outpatient Medications   Medication Instructions    ADVAIR HFA 45-21 mcg/actuation HFAA inhaler 2 puffs, Inhalation, Daily    albuterol (PROVENTIL HFA) 90 mcg/actuation inhaler 2 puffs, Inhalation, Every 4 hours PRN, Rescue    aspirin (ECOTRIN) 81 mg, Oral, Daily    budesonide (ENTOCORT EC) 3 mg, 2 times daily    diclofenac (VOLTAREN) 75 mg, Daily PRN    fluticasone propionate (FLONASE) 50 mcg/actuation nasal spray 1 spray, Each Nostril, 2 times daily    furosemide (LASIX) 20 mg, Daily    gabapentin (NEURONTIN) 300 mg, Oral, 3 times daily    KRISTALOSE 20 g, Oral, Daily PRN    levocetirizine (XYZAL) 5 mg, Oral    lisinopriL-hydrochlorothiazide (PRINZIDE,ZESTORETIC) 20-25 mg Tab 1 tablet, Daily    multivit-min/FA/lycopen/lutein (CENTRUM SILVER MEN ORAL) Oral, Daily     omeprazole (PRILOSEC) 40 mg    potassium chloride (KLOR-CON) 10 MEQ TbSR 20 mEq, Once    RESTASIS 0.05 % ophthalmic emulsion 1 drop, Both Eyes, 2 times daily    XIIDRA 5 % Dpet 1 drop, Both Eyes, 2 times daily    zolpidem (AMBIEN) 5 mg, Nightly PRN       Current Inpatient Medications   artificial tears  2 drop Both Eyes Q4H While awake    cetirizine  10 mg Oral Daily    fluticasone furoate-vilanteroL  1 puff Inhalation Daily    fluticasone propionate  1 spray Each Nostril BID    furosemide  20 mg Oral Daily    gabapentin  300 mg Oral TID    lisinopriL  20 mg Oral Daily    And    hydroCHLOROthiazide  25 mg Oral Daily    ibuprofen  600 mg Oral Q6H    lactulose 10 gram/15 ml  10 g Oral Daily    lifitegrast  1 drop Both Eyes BID    pantoprazole  40 mg Oral Daily    traZODone  25 mg Oral QHS         ROS negative unless documented in the history of present illness.        Objective:       Intake/Output Summary (Last 24 hours) at 1/21/2025 1858  Last data filed at 1/21/2025 1256  Gross per 24 hour   Intake --   Output 2150 ml   Net -2150 ml         Vital Signs (Most Recent):  Temp: 98.1 °F (36.7 °C) (01/21/25 1551)  Pulse: 101 (01/21/25 1551)  Resp: 18 (01/21/25 1801)  BP: 101/64 (01/21/25 1551)  SpO2: (!) 90 % (01/21/25 1551)  Body mass index is 35.95 kg/m².  Weight: 127 kg (280 lb) Vital Signs (24h Range):  Temp:  [97.2 °F (36.2 °C)-98.4 °F (36.9 °C)] 98.1 °F (36.7 °C)  Pulse:  [] 101  Resp:  [18-20] 18  SpO2:  [90 %-97 %] 90 %  BP: (101-117)/(64-80) 101/64       Physical Exam:  Gen- A/O, NAD  HENT- ATNC, MMM  CV- RRR  Resp- bilateral dry inspiratory crackles, normal work of breathing; oxygen saturations low 90s on room air  MSK- WWP, trace bilateral lower extremity edema  Neuro- A/Ox3, WERNER, no gross deficits  Psych- appropriate mood and affect       Lines/Drains/Airways       Peripheral Intravenous Line  Duration                  Peripheral IV - Single Lumen 01/18/25 2154 20 G Anterior;Left Forearm 2 days                     Significant Labs:  Lab Results   Component Value Date    WBC 7.94 01/19/2025    HGB 11.9 (L) 01/19/2025    HCT 35.8 (L) 01/19/2025    MCV 83.4 01/19/2025     01/19/2025       BMP  Lab Results   Component Value Date     (L) 01/19/2025    K 3.9 01/19/2025    CO2 24 01/19/2025    BUN 15.7 01/19/2025    CREATININE 0.83 01/19/2025    CALCIUM 9.0 01/19/2025    AGAP 10.0 01/19/2025    EGFRNONAA 83 04/06/2022         Significant Imaging:  CT chest 01/19/2025 personally reviewed:  Mid and lower lung field predominant equal bilateral fibrotic changes without evidence of significant ground-glass opacities or honeycomb cyst formation, and small volume lower lobe predominant cylindrical traction bronchiectasis and overall progressed from 2023 imaging; overall I feel that this is a pattern indeterminate for UIP, would favor more confidently calling a UIP pattern in the event honeycomb cyst formation was evident        Assessment/Plan:     Assessment  Progressive fibrotic interstitial lung disease  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  Tobacco abuse      Plan  Has known history of rheumatoid arthritis, for which he follows rheumatology clinic--> previously on Ofev, stopped due to concerns for colitis, reportedly started on Actemra but last rheumatology visit documented 11/2023   Currently on no therapy for RA or interstitial lung disease  Recheck CCP, CRP this hospital stay--> has history of strongly positive CCP, may be indicative of underlying rheumatoid lung disease and warrant more aggressive workup and treatment, as well as C-reactive protein to document a baseline   I see no evidence of interstitial lung disease flare to suggest more aggressive immunosuppression with high-dose corticosteroids at this time  Strongly encouraged complete tobacco cessation  Given issues with Ofev in the past, consider initiation of Esbriet at pulmonary clinic follow-up  Will need follow-up in  Pulmonary Clinic within 4-6 weeks after discharge  Recommend referral back to Rheumatology Clinic for re-evaluation and initiation of therapy  Stable for discharge from a pulmonary perspective             Salvador Shaw MD  Pulmonary Disease   [facility]  DOS: 01/21/2025

## 2025-01-22 NOTE — PROGRESS NOTES
[facility]  Pulmonary/Critical Care  Progress Note  1/22/2025    Patient Name: Vic Meng  MRN: [id number]  Admission Date: 1/18/2025  Code Status: Full Code      Subjective:     HPI:      Hospital Course:  He has a long history of rheumatoid arthritis, and was previously diagnosed with antisynthetase syndrome (ADELA 1:2560 cytoplasmic/speckled and positive PL-12).  He has known rheumatoid lung and probable antisynthetase syndrome associated interstitial lung disease with pulmonary fibrosis, followed through [facility] rheumatology.  He has been receiving chronic Actemra.  Slowly on high-dose daily prednisone (patient reports greater than 1 year ago) and Ofev, also discontinued by patient in past.  Previous PFTs demonstrated restrictive ventilatory impairment with decreased DLCO.  He presented this hospital stay with complaints of worsening shortness of breath.  He states that he previously had home oxygen, however this was a few years in the past.    Complete PFTs (11/21/2023): FEV1 1.8 (54% predicted), FVC 2.4 (54% predicted), FEV1/FVC = 76%; TLC 3.6 (46% predicted), DLCO 6.7 (21% predicted)    24hr Interval History:  I have extensively reviewed this patient's hospital stay and presentation, as being seen by me for the 1st time this a.m..  He remains afebrile.  SaO2 90-95% on 3 L nasal cannula.  He admits to cough productive of yellow secretions.  He is currently not receiving any antibiotics, only received single doses in ER).  He feels back to his respiratory baseline.    Scheduled Medications:   artificial tears  2 drop Both Eyes Q4H While awake    cetirizine  10 mg Oral Daily    fluticasone furoate-vilanteroL  1 puff Inhalation Daily    fluticasone propionate  1 spray Each Nostril BID    furosemide  20 mg Oral Daily    gabapentin  300 mg Oral TID    lisinopriL  20 mg Oral Daily    And    hydroCHLOROthiazide  25 mg Oral Daily    ibuprofen  600 mg Oral Q6H    lactulose 10 gram/15  ml  10 g Oral Daily    lifitegrast  1 drop Both Eyes BID    pantoprazole  40 mg Oral Daily    traZODone  25 mg Oral QHS     PRN Medications:    Current Facility-Administered Medications:     albuterol, 2 puff, Inhalation, Q4H PRN    HYDROcodone-acetaminophen, 1 tablet, Oral, Q4H PRN    melatonin, 6 mg, Oral, Nightly PRN    ondansetron, 4 mg, Intravenous, Q8H PRN    sodium chloride 0.9%, 10 mL, Intravenous, PRN    zolpidem, 10 mg, Oral, Nightly PRN  Continuous Infusions:      Past Medical History:   Diagnosis Date    ADELA positive     Antisynthetase syndrome     Diabetes mellitus     GERD (gastroesophageal reflux disease)     Herpes zoster     Hypertension     ILD (interstitial lung disease)     Lymphadenopathy     Morbid obesity     Osteoarthritis of right knee     Rheumatoid arthritis     Snoring     Supraclavicular mass     Thyroid nodule     Venous insufficiency of lower extremity        Past Surgical History:   Procedure Laterality Date    Drainage of right thyroid gland lobe  10/07/2021    Laser surgery left leg Left     Right hand surgery         Objective:     Input/output:    Intake/Output Summary (Last 24 hours) at 1/22/2025 1022  Last data filed at 1/22/2025 0523  Gross per 24 hour   Intake --   Output 900 ml   Net -900 ml       Vital Signs (Most Recent):  Temp: 98 °F (36.7 °C) (01/22/25 0736)  Pulse: 100 (01/22/25 0901)  Resp: 20 (01/22/25 0902)  BP: 119/72 (01/22/25 0903)  SpO2: 95 % (01/22/25 0901)  Body mass index is 35.95 kg/m².  Weight: 127 kg (280 lb) Vital Signs (24h Range):  Temp:  [97.5 °F (36.4 °C)-98.1 °F (36.7 °C)] 98 °F (36.7 °C)  Pulse:  [] 100  Resp:  [18-20] 20  SpO2:  [90 %-95 %] 95 %  BP: (101-126)/(64-78) 119/72     Physical Exam    Lines/Drains/Airways       Peripheral Intravenous Line  Duration                  Peripheral IV - Single Lumen 01/18/25 2154 20 G Anterior;Left Forearm 3 days                    Vent:       ABGs:  Lab Results   Component Value Date    PH 7.41 01/06/2021  "   PO2 68.0 (L) 01/06/2021    PCO2 44.0 01/06/2021    POCSATURATED 96.7 01/06/2021         Significant Labs:    Lab Results   Component Value Date    WBC 7.94 01/19/2025    HGB 11.9 (L) 01/19/2025    HCT 35.8 (L) 01/19/2025    MCV 83.4 01/19/2025     01/19/2025         No results for input(s): "NA", "K", "CL", "CO2", "BUN", "CREATININE", "CALCIUM", "MG", "PHOS", "TRIG", "ANIONGAP", "AST", "ALT", "ALKPHOS", "ALBUMIN", "CKTOTAL", "CKMB", "TROPONINI", "BNP", "INR", "PROTIME", "PTT", "EGFRNONAA" in the last 24 hours.    Invalid input(s): "TBIL"      Imaging:   CT and chest x-ray images reviewed    Assessment:     Longstanding rheumatoid arthritis with known anti synthetase syndrome.  [facility] Rheumatology, on daily prednisone, Actemra.  He previously received rituximab.  Chronic interstitial lung disease, of rheumatoid/antisynthetase syndrome etiology.  He previously received Ofev.      Plan:     Agree with Dr. Shaw pulmonary evaluation.  Will begin p.o. Augmentin 875 mg b.i.d. for 5 days and azithromycin 250 mg daily for 4 days.  Prednisone 40 mg p.o. daily for 4 days  Discussed importance of continued smoking cessation (quit 2 weeks ago)  He appears ready for discharge home at this point.  Nothing additional to add at this point from a pulmonary aspect.       Doreen Patel MD, [facility]  Pulmonary/Critical Care      "

## 2025-01-23 PROCEDURE — 63600175 PHARM REV CODE 636 W HCPCS: Performed by: INTERNAL MEDICINE

## 2025-01-23 PROCEDURE — 27000221 HC OXYGEN, UP TO 24 HOURS

## 2025-01-23 PROCEDURE — 25000242 PHARM REV CODE 250 ALT 637 W/ HCPCS: Performed by: INTERNAL MEDICINE

## 2025-01-23 PROCEDURE — 63700000 PHARM REV CODE 250 ALT 637 W/O HCPCS: Performed by: INTERNAL MEDICINE

## 2025-01-23 PROCEDURE — 25000003 PHARM REV CODE 250: Performed by: EMERGENCY MEDICINE

## 2025-01-23 PROCEDURE — 21400001 HC TELEMETRY ROOM

## 2025-01-23 PROCEDURE — 25000003 PHARM REV CODE 250: Performed by: INTERNAL MEDICINE

## 2025-01-23 RX ADMIN — FLUTICASONE FUROATE AND VILANTEROL TRIFENATATE 1 PUFF: 100; 25 POWDER RESPIRATORY (INHALATION) at 08:01

## 2025-01-23 RX ADMIN — ZOLPIDEM TARTRATE 10 MG: 5 TABLET, FILM COATED ORAL at 08:01

## 2025-01-23 RX ADMIN — IBUPROFEN 600 MG: 600 TABLET ORAL at 06:01

## 2025-01-23 RX ADMIN — IBUPROFEN 600 MG: 600 TABLET ORAL at 12:01

## 2025-01-23 RX ADMIN — FLUTICASONE PROPIONATE 50 MCG: 50 SPRAY, METERED NASAL at 08:01

## 2025-01-23 RX ADMIN — PANTOPRAZOLE SODIUM 40 MG: 40 TABLET, DELAYED RELEASE ORAL at 08:01

## 2025-01-23 RX ADMIN — PREDNISONE 40 MG: 20 TABLET ORAL at 08:01

## 2025-01-23 RX ADMIN — FUROSEMIDE 20 MG: 20 TABLET ORAL at 04:01

## 2025-01-23 RX ADMIN — AZITHROMYCIN DIHYDRATE 250 MG: 250 TABLET ORAL at 08:01

## 2025-01-23 RX ADMIN — HYPROMELLOSE 2910 2 DROP: 5 SOLUTION/ DROPS OPHTHALMIC at 04:01

## 2025-01-23 RX ADMIN — LACTULOSE 10 G: 10 SOLUTION ORAL at 08:01

## 2025-01-23 RX ADMIN — TRAZODONE HYDROCHLORIDE 25 MG: 50 TABLET ORAL at 08:01

## 2025-01-23 RX ADMIN — HYPROMELLOSE 2910 2 DROP: 5 SOLUTION/ DROPS OPHTHALMIC at 06:01

## 2025-01-23 RX ADMIN — HYDROCODONE BITARTRATE AND ACETAMINOPHEN 1 TABLET: 7.5; 325 TABLET ORAL at 08:01

## 2025-01-23 RX ADMIN — AMOXICILLIN AND CLAVULANATE POTASSIUM 1 TABLET: 875; 125 TABLET, FILM COATED ORAL at 08:01

## 2025-01-23 RX ADMIN — LISINOPRIL 20 MG: 20 TABLET ORAL at 08:01

## 2025-01-23 RX ADMIN — HYPROMELLOSE 2910 2 DROP: 5 SOLUTION/ DROPS OPHTHALMIC at 08:01

## 2025-01-23 RX ADMIN — HYDROCHLOROTHIAZIDE 25 MG: 25 TABLET ORAL at 08:01

## 2025-01-23 RX ADMIN — GABAPENTIN 300 MG: 300 CAPSULE ORAL at 04:01

## 2025-01-23 RX ADMIN — GABAPENTIN 300 MG: 300 CAPSULE ORAL at 08:01

## 2025-01-23 RX ADMIN — HYDROCODONE BITARTRATE AND ACETAMINOPHEN 1 TABLET: 7.5; 325 TABLET ORAL at 04:01

## 2025-01-23 RX ADMIN — CETIRIZINE HYDROCHLORIDE 10 MG: 10 TABLET, FILM COATED ORAL at 08:01

## 2025-01-23 NOTE — PROGRESS NOTES
[facility] [address]  Wound Care    Patient Name:  Vic Meng   MRN:  [id number]  Date: 1/22/2025  Diagnosis: Interstitial lung disease    History:     Past Medical History:   Diagnosis Date    ADELA positive     Antisynthetase syndrome     Diabetes mellitus     GERD (gastroesophageal reflux disease)     Herpes zoster     Hypertension     ILD (interstitial lung disease)     Lymphadenopathy     Morbid obesity     Osteoarthritis of right knee     Rheumatoid arthritis     Snoring     Supraclavicular mass     Thyroid nodule     Venous insufficiency of lower extremity        Social History     Socioeconomic History    Marital status: Single   Tobacco Use    Smoking status: Every Day     Current packs/day: 0.25     Types: Cigarettes    Smokeless tobacco: Never    Tobacco comments:     Only about 2 cigarettes a day    Substance and Sexual Activity    Alcohol use: Yes     Comment: One beer a month    Drug use: Yes     Types: Marijuana     Comment: Occasionally     Social Drivers of Health     Financial Resource Strain: Medium Risk (1/21/2025)    Overall Financial Resource Strain (CARDIA)     Difficulty of Paying Living Expenses: Somewhat hard   Food Insecurity: No Food Insecurity (1/21/2025)    Hunger Vital Sign     Worried About Running Out of Food in the Last Year: Never true     Ran Out of Food in the Last Year: Never true   Transportation Needs: No Transportation Needs (1/21/2025)    TRANSPORTATION NEEDS     Transportation : No   Stress: No Stress Concern Present (1/21/2025)    [employer] - Occupational Stress Questionnaire     Feeling of Stress : Only a little   Housing Stability: Low Risk  (1/21/2025)    Housing Stability Vital Sign     Unable to Pay for Housing in the Last Year: No     Homeless in the Last Year: No       Precautions:     Allergies as of 01/18/2025    (No Known Allergies)       WOC Assessment Details/Treatment   WOCN consulted for toe nail  clipping. Discussed plan of care with nurse Caldera prior to visiting the patient. Introduced self and explained reason for visit, patient agreeable to be seen. Patients nails cleaned, clipped and filed. Patient satisfied with treatment. No further questions at this time. No follow up needed. Will follow up.    01/22/2025

## 2025-01-24 VITALS
HEIGHT: 74 IN | HEART RATE: 85 BPM | WEIGHT: 280 LBS | TEMPERATURE: 97 F | SYSTOLIC BLOOD PRESSURE: 111 MMHG | BODY MASS INDEX: 35.94 KG/M2 | OXYGEN SATURATION: 96 % | DIASTOLIC BLOOD PRESSURE: 77 MMHG | RESPIRATION RATE: 18 BRPM

## 2025-01-24 PROBLEM — Z79.899 HIGH RISK MEDICATION USE: Status: RESOLVED | Noted: 2023-01-04 | Resolved: 2025-01-24

## 2025-01-24 PROBLEM — I10 HYPERTENSION: Status: RESOLVED | Noted: 2023-01-03 | Resolved: 2025-01-24

## 2025-01-24 PROCEDURE — 63600175 PHARM REV CODE 636 W HCPCS: Performed by: INTERNAL MEDICINE

## 2025-01-24 PROCEDURE — 25000003 PHARM REV CODE 250: Performed by: EMERGENCY MEDICINE

## 2025-01-24 PROCEDURE — 25000003 PHARM REV CODE 250: Performed by: INTERNAL MEDICINE

## 2025-01-24 PROCEDURE — 63700000 PHARM REV CODE 250 ALT 637 W/O HCPCS: Performed by: INTERNAL MEDICINE

## 2025-01-24 PROCEDURE — 25000242 PHARM REV CODE 250 ALT 637 W/ HCPCS: Performed by: INTERNAL MEDICINE

## 2025-01-24 RX ADMIN — PREDNISONE 40 MG: 20 TABLET ORAL at 09:01

## 2025-01-24 RX ADMIN — IBUPROFEN 600 MG: 600 TABLET ORAL at 01:01

## 2025-01-24 RX ADMIN — GABAPENTIN 300 MG: 300 CAPSULE ORAL at 03:01

## 2025-01-24 RX ADMIN — LISINOPRIL 20 MG: 20 TABLET ORAL at 09:01

## 2025-01-24 RX ADMIN — AMOXICILLIN AND CLAVULANATE POTASSIUM 1 TABLET: 875; 125 TABLET, FILM COATED ORAL at 09:01

## 2025-01-24 RX ADMIN — FLUTICASONE PROPIONATE 50 MCG: 50 SPRAY, METERED NASAL at 09:01

## 2025-01-24 RX ADMIN — HYDROCODONE BITARTRATE AND ACETAMINOPHEN 1 TABLET: 7.5; 325 TABLET ORAL at 05:01

## 2025-01-24 RX ADMIN — HYDROCHLOROTHIAZIDE 25 MG: 25 TABLET ORAL at 09:01

## 2025-01-24 RX ADMIN — GABAPENTIN 300 MG: 300 CAPSULE ORAL at 09:01

## 2025-01-24 RX ADMIN — MELATONIN TAB 3 MG 6 MG: 3 TAB at 03:01

## 2025-01-24 RX ADMIN — HYPROMELLOSE 2910 2 DROP: 5 SOLUTION/ DROPS OPHTHALMIC at 09:01

## 2025-01-24 RX ADMIN — CETIRIZINE HYDROCHLORIDE 10 MG: 10 TABLET, FILM COATED ORAL at 09:01

## 2025-01-24 RX ADMIN — LACTULOSE 10 G: 10 SOLUTION ORAL at 09:01

## 2025-01-24 RX ADMIN — HYPROMELLOSE 2910 2 DROP: 5 SOLUTION/ DROPS OPHTHALMIC at 03:01

## 2025-01-24 RX ADMIN — AZITHROMYCIN DIHYDRATE 250 MG: 250 TABLET ORAL at 09:01

## 2025-01-24 RX ADMIN — PANTOPRAZOLE SODIUM 40 MG: 40 TABLET, DELAYED RELEASE ORAL at 09:01

## 2025-01-24 RX ADMIN — HYDROCODONE BITARTRATE AND ACETAMINOPHEN 1 TABLET: 7.5; 325 TABLET ORAL at 03:01

## 2025-01-24 RX ADMIN — FLUTICASONE FUROATE AND VILANTEROL TRIFENATATE 1 PUFF: 100; 25 POWDER RESPIRATORY (INHALATION) at 09:01

## 2025-01-24 RX ADMIN — HYDROCODONE BITARTRATE AND ACETAMINOPHEN 1 TABLET: 7.5; 325 TABLET ORAL at 09:01

## 2025-01-24 NOTE — NURSING
Patient stable upon discharge. Discharge education and information given prior to discharge. Patient verbalized understanding of information and education given. Patient left via transport.

## 2025-01-24 NOTE — PLAN OF CARE
01/24/25 1338   Final Note   Assessment Type Final Discharge Note   Anticipated Discharge Disposition Home   Hospital Resources/Appts/Education Provided Appointments scheduled and added to AVS   Post-Acute Status   Coverage Payor: [employer] MEDICARE - [employer] MEDICARE PPO -   Patient choice form signed by patient/caregiver List with quality metrics by geographic area provided   Discharge Delays None known at this time     The patient will be discharged from [facility] to his private residence with Daughter: Lindsay Meng ([phone]). The patient received O2 through: [employer]; his portable tank was delivered to his room prior to D/C home today. The patient was made aware of the above discharge plans and he is in agreement. Transportation will be arranged by his family member's personal vehicle.

## 2025-01-24 NOTE — PLAN OF CARE
Referral was sent via [employer] to [facility] (Home O2 & Portable). Received an update by Micheline Terry (Patient Care Coordinator) who reports that the patient's insurance is not in network.

## 2025-01-24 NOTE — PLAN OF CARE
Home O2 Testing:   O2 sat on O2 at: 2L/NC: 97%.     O2 sat on room air at rest: 95%     O2 sat on room air with ambulation: 81%     O2 sat with O2 at 2L/NC replaced and recovered: 96% with ambulation.    Note: Patient does qualify for home O2.

## 2025-01-24 NOTE — PLAN OF CARE
Problem: Adult Inpatient Plan of Care  Goal: Plan of Care Review  Outcome: Met  Goal: Patient-Specific Goal (Individualized)  Outcome: Met  Goal: Absence of Hospital-Acquired Illness or Injury  Outcome: Met  Goal: Optimal Comfort and Wellbeing  Outcome: Met  Goal: Readiness for Transition of Care  Outcome: Met     Problem: Pneumonia  Goal: Fluid Balance  Outcome: Met  Goal: Resolution of Infection Signs and Symptoms  Outcome: Met  Goal: Effective Oxygenation and Ventilation  Outcome: Met     Problem: Diabetes Comorbidity  Goal: Blood Glucose Level Within Targeted Range  Outcome: Met

## 2025-01-25 LAB — CYCLIC CITRULLINATED PEPTIDE (CCP) (OHS): >340 U/ML

## 2025-01-25 NOTE — DISCHARGE SUMMARY
[facility]                       [address] [address]    PATIENT NAME:       VIC NUNEZ   YOB: 1960  CSN:                [id number]   MRN:                [id number]  ADMIT DATE:         01/18/2025 19:46:00  PHYSICIAN:          Sarabjit Arreaga MD                          DISCHARGE SUMMARY    DATE OF DISCHARGE:  01/24/2025 17:30:00    FINAL DIAGNOSES:    1. Progressive fibrotic interstitial lung disease.  2. Rheumatoid arthritis.  3. Dyspnea.    4. Diabetes mellitus type 2.   5. Hypertension.    HOSPITAL COURSE:  Vic is a 64-year-old  male, came to the   hospital complaining of diffuse joint pain and was complaining of increasing   shortness of breath.  Therefore, the patient was evaluated by the physician and   contacted me.  The patient was admitted and was placed on oxygen and also pain   medication to relieve the pain.  During the hospitalization, the shortness of   breath had been increased.  The patient was kept on oxygen and I did consult   pulmonologist to see the patient.  They came in to see the patient and agreed   that the patient has a progressive fibrotic interstitial lung disease.  He was   placed on prednisone and also the antibiotic was started.  At this present time,   the pulmonologist thinks the patient can be discharged to home and to be   managed as an outpatient.  Therefore, we are going to discharge the patient to   home and to be followed as an outpatient.  I will see the patient in my office   *** in 2 weeks.        ______________________________  Sarabjit Arreaga MD    CHL/AQS  DD:  01/24/2025  Time:  03:07PM  DT:  01/24/2025  Time:  08:54PM  Job #:  [id number]      DISCHARGE SUMMARY

## 2025-01-27 ENCOUNTER — PATIENT OUTREACH (OUTPATIENT)
Dept: ADMINISTRATIVE | Facility: CLINIC | Age: 65
End: 2025-01-27
Payer: MEDICARE

## 2025-01-27 NOTE — PROGRESS NOTES
Returned TCM call, he stated that the Oxygen company said they will not bring him Oxygen because he did not pay bill , I explained we do not get involved with billing he can discuss with the company, or call his PCP to get Oxygen for a different company.

## 2025-01-27 NOTE — PROGRESS NOTES
C3 nurse spoke with Vic Meng  for a TCC post hospital discharge follow up call. The patient does not have a scheduled HOSFU appointment with Sarabjit Arreaga MD. Unable to message PCP staff. Patient awaiting hospital staff to call with a follow up appointment to visit PCP, Dr. Andrea Manzo and Dr. Pizano.

## 2025-01-27 NOTE — PROGRESS NOTES
Spoke with patient. Questioned if I was with [facility]. Offered patient to call me back and leave a voicemail for me to return his call. Patient agreed.  Unable to see message via portal.

## 2025-03-09 ENCOUNTER — HOSPITAL ENCOUNTER (INPATIENT)
Facility: HOSPITAL | Age: 65
LOS: 4 days | Discharge: HOME | DRG: 178 | End: 2025-03-13
Attending: STUDENT IN AN ORGANIZED HEALTH CARE EDUCATION/TRAINING PROGRAM | Admitting: INTERNAL MEDICINE
Payer: MEDICARE

## 2025-03-09 DIAGNOSIS — M05.79 RHEUMATOID ARTHRITIS INVOLVING MULTIPLE SITES WITH POSITIVE RHEUMATOID FACTOR: ICD-10-CM

## 2025-03-09 DIAGNOSIS — R06.02 SOB (SHORTNESS OF BREATH): ICD-10-CM

## 2025-03-09 DIAGNOSIS — J44.9 CHRONIC OBSTRUCTIVE PULMONARY DISEASE, UNSPECIFIED COPD TYPE: ICD-10-CM

## 2025-03-09 DIAGNOSIS — M17.11 PRIMARY OSTEOARTHRITIS OF RIGHT KNEE: ICD-10-CM

## 2025-03-09 DIAGNOSIS — U07.1 COVID-19: Primary | ICD-10-CM

## 2025-03-09 DIAGNOSIS — R09.02 HYPOXIA: ICD-10-CM

## 2025-03-09 LAB
ALBUMIN SERPL-MCNC: 2.6 G/DL (ref 3.4–4.8)
ALBUMIN/GLOB SERPL: 0.5 RATIO (ref 1.1–2)
ALP SERPL-CCNC: 103 UNIT/L (ref 40–150)
ALT SERPL-CCNC: 13 UNIT/L (ref 0–55)
ANION GAP SERPL CALC-SCNC: 12 MEQ/L
AST SERPL-CCNC: 13 UNIT/L (ref 5–34)
BASOPHILS # BLD AUTO: 0.04 X10(3)/MCL
BASOPHILS NFR BLD AUTO: 0.6 %
BILIRUB SERPL-MCNC: 0.6 MG/DL
BNP BLD-MCNC: 52 PG/ML
BUN SERPL-MCNC: 16.4 MG/DL (ref 8.4–25.7)
CALCIUM SERPL-MCNC: 8.5 MG/DL (ref 8.8–10)
CHLORIDE SERPL-SCNC: 98 MMOL/L (ref 98–107)
CO2 SERPL-SCNC: 26 MMOL/L (ref 23–31)
CREAT SERPL-MCNC: 0.92 MG/DL (ref 0.72–1.25)
CREAT/UREA NIT SERPL: 18
EOSINOPHIL # BLD AUTO: 0.11 X10(3)/MCL (ref 0–0.9)
EOSINOPHIL NFR BLD AUTO: 1.6 %
ERYTHROCYTE [DISTWIDTH] IN BLOOD BY AUTOMATED COUNT: 15.1 % (ref 11.5–17)
FLUAV AG UPPER RESP QL IA.RAPID: NOT DETECTED
FLUBV AG UPPER RESP QL IA.RAPID: NOT DETECTED
GFR SERPLBLD CREATININE-BSD FMLA CKD-EPI: >60 ML/MIN/1.73/M2
GLOBULIN SER-MCNC: 5.2 GM/DL (ref 2.4–3.5)
GLUCOSE SERPL-MCNC: 117 MG/DL (ref 82–115)
HCT VFR BLD AUTO: 33.6 % (ref 42–52)
HGB BLD-MCNC: 11.2 G/DL (ref 14–18)
IMM GRANULOCYTES # BLD AUTO: 0.06 X10(3)/MCL (ref 0–0.04)
IMM GRANULOCYTES NFR BLD AUTO: 0.9 %
LYMPHOCYTES # BLD AUTO: 0.59 X10(3)/MCL (ref 0.6–4.6)
LYMPHOCYTES NFR BLD AUTO: 8.4 %
MCH RBC QN AUTO: 26.9 PG (ref 27–31)
MCHC RBC AUTO-ENTMCNC: 33.3 G/DL (ref 33–36)
MCV RBC AUTO: 80.8 FL (ref 80–94)
MONOCYTES # BLD AUTO: 0.94 X10(3)/MCL (ref 0.1–1.3)
MONOCYTES NFR BLD AUTO: 13.4 %
NEUTROPHILS # BLD AUTO: 5.26 X10(3)/MCL (ref 2.1–9.2)
NEUTROPHILS NFR BLD AUTO: 75.1 %
NRBC BLD AUTO-RTO: 0 %
OHS QRS DURATION: 138 MS
OHS QTC CALCULATION: 551 MS
PLATELET # BLD AUTO: 357 X10(3)/MCL (ref 130–400)
PMV BLD AUTO: 10.1 FL (ref 7.4–10.4)
POTASSIUM SERPL-SCNC: 3.5 MMOL/L (ref 3.5–5.1)
PROT SERPL-MCNC: 7.8 GM/DL (ref 5.8–7.6)
RBC # BLD AUTO: 4.16 X10(6)/MCL (ref 4.7–6.1)
RSV A 5' UTR RNA NPH QL NAA+PROBE: NOT DETECTED
SARS-COV-2 RNA RESP QL NAA+PROBE: DETECTED
SODIUM SERPL-SCNC: 136 MMOL/L (ref 136–145)
TROPONIN I SERPL-MCNC: 0.02 NG/ML (ref 0–0.04)
WBC # BLD AUTO: 7 X10(3)/MCL (ref 4.5–11.5)

## 2025-03-09 PROCEDURE — 25000003 PHARM REV CODE 250: Performed by: INTERNAL MEDICINE

## 2025-03-09 PROCEDURE — 93005 ELECTROCARDIOGRAM TRACING: CPT

## 2025-03-09 PROCEDURE — 85025 COMPLETE CBC W/AUTO DIFF WBC: CPT | Performed by: STUDENT IN AN ORGANIZED HEALTH CARE EDUCATION/TRAINING PROGRAM

## 2025-03-09 PROCEDURE — 21400001 HC TELEMETRY ROOM

## 2025-03-09 PROCEDURE — 63600175 PHARM REV CODE 636 W HCPCS: Mod: JZ,TB | Performed by: STUDENT IN AN ORGANIZED HEALTH CARE EDUCATION/TRAINING PROGRAM

## 2025-03-09 PROCEDURE — 27000207 HC ISOLATION

## 2025-03-09 PROCEDURE — 80053 COMPREHEN METABOLIC PANEL: CPT | Performed by: STUDENT IN AN ORGANIZED HEALTH CARE EDUCATION/TRAINING PROGRAM

## 2025-03-09 PROCEDURE — 25000003 PHARM REV CODE 250: Performed by: STUDENT IN AN ORGANIZED HEALTH CARE EDUCATION/TRAINING PROGRAM

## 2025-03-09 PROCEDURE — XW033E5 INTRODUCTION OF REMDESIVIR ANTI-INFECTIVE INTO PERIPHERAL VEIN, PERCUTANEOUS APPROACH, NEW TECHNOLOGY GROUP 5: ICD-10-PCS | Performed by: INTERNAL MEDICINE

## 2025-03-09 PROCEDURE — 0241U COVID/RSV/FLU A&B PCR: CPT | Performed by: STUDENT IN AN ORGANIZED HEALTH CARE EDUCATION/TRAINING PROGRAM

## 2025-03-09 PROCEDURE — 99285 EMERGENCY DEPT VISIT HI MDM: CPT | Mod: 25

## 2025-03-09 PROCEDURE — 11000001 HC ACUTE MED/SURG PRIVATE ROOM

## 2025-03-09 PROCEDURE — 93010 ELECTROCARDIOGRAM REPORT: CPT | Mod: ,,, | Performed by: INTERNAL MEDICINE

## 2025-03-09 PROCEDURE — 25000242 PHARM REV CODE 250 ALT 637 W/ HCPCS: Performed by: INTERNAL MEDICINE

## 2025-03-09 PROCEDURE — 84484 ASSAY OF TROPONIN QUANT: CPT | Performed by: STUDENT IN AN ORGANIZED HEALTH CARE EDUCATION/TRAINING PROGRAM

## 2025-03-09 PROCEDURE — 96374 THER/PROPH/DIAG INJ IV PUSH: CPT

## 2025-03-09 PROCEDURE — 83880 ASSAY OF NATRIURETIC PEPTIDE: CPT | Performed by: STUDENT IN AN ORGANIZED HEALTH CARE EDUCATION/TRAINING PROGRAM

## 2025-03-09 PROCEDURE — 96375 TX/PRO/DX INJ NEW DRUG ADDON: CPT

## 2025-03-09 RX ORDER — LISINOPRIL 20 MG/1
20 TABLET ORAL DAILY
Status: DISCONTINUED | OUTPATIENT
Start: 2025-03-10 | End: 2025-03-13 | Stop reason: HOSPADM

## 2025-03-09 RX ORDER — MORPHINE SULFATE 4 MG/ML
4 INJECTION, SOLUTION INTRAMUSCULAR; INTRAVENOUS
Refills: 0 | Status: COMPLETED | OUTPATIENT
Start: 2025-03-09 | End: 2025-03-09

## 2025-03-09 RX ORDER — DEXAMETHASONE SODIUM PHOSPHATE 4 MG/ML
8 INJECTION, SOLUTION INTRA-ARTICULAR; INTRALESIONAL; INTRAMUSCULAR; INTRAVENOUS; SOFT TISSUE
Status: COMPLETED | OUTPATIENT
Start: 2025-03-09 | End: 2025-03-09

## 2025-03-09 RX ORDER — OLANZAPINE 5 MG/1
10 TABLET ORAL DAILY
Status: DISCONTINUED | OUTPATIENT
Start: 2025-03-10 | End: 2025-03-13 | Stop reason: HOSPADM

## 2025-03-09 RX ORDER — ONDANSETRON HYDROCHLORIDE 2 MG/ML
4 INJECTION, SOLUTION INTRAVENOUS EVERY 8 HOURS PRN
Status: DISCONTINUED | OUTPATIENT
Start: 2025-03-09 | End: 2025-03-13 | Stop reason: HOSPADM

## 2025-03-09 RX ORDER — FLUTICASONE PROPIONATE AND SALMETEROL XINAFOATE 45; 21 UG/1; UG/1
2 AEROSOL, METERED RESPIRATORY (INHALATION) DAILY
Status: DISCONTINUED | OUTPATIENT
Start: 2025-03-10 | End: 2025-03-09

## 2025-03-09 RX ORDER — ONDANSETRON HYDROCHLORIDE 2 MG/ML
4 INJECTION, SOLUTION INTRAVENOUS
Status: COMPLETED | OUTPATIENT
Start: 2025-03-09 | End: 2025-03-09

## 2025-03-09 RX ORDER — ALBUTEROL SULFATE 90 UG/1
2 INHALANT RESPIRATORY (INHALATION) EVERY 4 HOURS PRN
Status: DISCONTINUED | OUTPATIENT
Start: 2025-03-09 | End: 2025-03-13 | Stop reason: HOSPADM

## 2025-03-09 RX ORDER — FUROSEMIDE 20 MG/1
20 TABLET ORAL DAILY
Status: DISCONTINUED | OUTPATIENT
Start: 2025-03-10 | End: 2025-03-13 | Stop reason: HOSPADM

## 2025-03-09 RX ORDER — CETIRIZINE HYDROCHLORIDE 5 MG/5ML
5 SOLUTION ORAL DAILY
Status: DISCONTINUED | OUTPATIENT
Start: 2025-03-10 | End: 2025-03-13 | Stop reason: HOSPADM

## 2025-03-09 RX ORDER — FLUTICASONE PROPIONATE 50 MCG
1 SPRAY, SUSPENSION (ML) NASAL 2 TIMES DAILY
Status: DISCONTINUED | OUTPATIENT
Start: 2025-03-09 | End: 2025-03-13 | Stop reason: HOSPADM

## 2025-03-09 RX ORDER — TALC
6 POWDER (GRAM) TOPICAL NIGHTLY PRN
Status: DISCONTINUED | OUTPATIENT
Start: 2025-03-09 | End: 2025-03-13 | Stop reason: HOSPADM

## 2025-03-09 RX ORDER — GABAPENTIN 300 MG/1
300 CAPSULE ORAL 3 TIMES DAILY
Status: DISCONTINUED | OUTPATIENT
Start: 2025-03-09 | End: 2025-03-13 | Stop reason: HOSPADM

## 2025-03-09 RX ORDER — ASPIRIN 81 MG/1
81 TABLET ORAL DAILY
Status: DISCONTINUED | OUTPATIENT
Start: 2025-03-10 | End: 2025-03-13 | Stop reason: HOSPADM

## 2025-03-09 RX ORDER — MUPIROCIN 20 MG/G
OINTMENT TOPICAL 2 TIMES DAILY
Status: DISCONTINUED | OUTPATIENT
Start: 2025-03-09 | End: 2025-03-13 | Stop reason: HOSPADM

## 2025-03-09 RX ORDER — HYDROCODONE BITARTRATE AND ACETAMINOPHEN 7.5; 325 MG/1; MG/1
1 TABLET ORAL EVERY 6 HOURS PRN
Refills: 0 | Status: DISCONTINUED | OUTPATIENT
Start: 2025-03-09 | End: 2025-03-13 | Stop reason: HOSPADM

## 2025-03-09 RX ORDER — FLUTICASONE FUROATE AND VILANTEROL 100; 25 UG/1; UG/1
1 POWDER RESPIRATORY (INHALATION) DAILY
Status: DISCONTINUED | OUTPATIENT
Start: 2025-03-10 | End: 2025-03-13 | Stop reason: HOSPADM

## 2025-03-09 RX ORDER — HYDROCHLOROTHIAZIDE 12.5 MG/1
12.5 TABLET ORAL DAILY
Status: DISCONTINUED | OUTPATIENT
Start: 2025-03-10 | End: 2025-03-13 | Stop reason: HOSPADM

## 2025-03-09 RX ORDER — POTASSIUM CHLORIDE 20 MEQ/1
20 TABLET, EXTENDED RELEASE ORAL ONCE
Status: COMPLETED | OUTPATIENT
Start: 2025-03-09 | End: 2025-03-09

## 2025-03-09 RX ORDER — IBUPROFEN 600 MG/1
600 TABLET ORAL EVERY 6 HOURS
Status: DISCONTINUED | OUTPATIENT
Start: 2025-03-09 | End: 2025-03-13 | Stop reason: HOSPADM

## 2025-03-09 RX ORDER — LISINOPRIL AND HYDROCHLOROTHIAZIDE 20; 25 MG/1; MG/1
1 TABLET ORAL DAILY
Status: DISCONTINUED | OUTPATIENT
Start: 2025-03-10 | End: 2025-03-09

## 2025-03-09 RX ORDER — PANTOPRAZOLE SODIUM 40 MG/1
40 TABLET, DELAYED RELEASE ORAL DAILY
Status: DISCONTINUED | OUTPATIENT
Start: 2025-03-10 | End: 2025-03-13 | Stop reason: HOSPADM

## 2025-03-09 RX ORDER — ZOLPIDEM TARTRATE 5 MG/1
5 TABLET ORAL NIGHTLY PRN
Status: DISCONTINUED | OUTPATIENT
Start: 2025-03-09 | End: 2025-03-13 | Stop reason: HOSPADM

## 2025-03-09 RX ORDER — BUDESONIDE 3 MG/1
3 CAPSULE, COATED PELLETS ORAL 2 TIMES DAILY
Status: DISCONTINUED | OUTPATIENT
Start: 2025-03-09 | End: 2025-03-13 | Stop reason: HOSPADM

## 2025-03-09 RX ORDER — SODIUM CHLORIDE 0.9 % (FLUSH) 0.9 %
10 SYRINGE (ML) INJECTION
Status: DISCONTINUED | OUTPATIENT
Start: 2025-03-09 | End: 2025-03-13 | Stop reason: HOSPADM

## 2025-03-09 RX ADMIN — MORPHINE SULFATE 4 MG: 4 INJECTION INTRAVENOUS at 07:03

## 2025-03-09 RX ADMIN — FLUTICASONE PROPIONATE 50 MCG: 50 SPRAY, METERED NASAL at 08:03

## 2025-03-09 RX ADMIN — IBUPROFEN 600 MG: 600 TABLET, FILM COATED ORAL at 11:03

## 2025-03-09 RX ADMIN — REMDESIVIR 200 MG: 100 INJECTION, POWDER, LYOPHILIZED, FOR SOLUTION INTRAVENOUS at 08:03

## 2025-03-09 RX ADMIN — ONDANSETRON 4 MG: 2 INJECTION INTRAMUSCULAR; INTRAVENOUS at 07:03

## 2025-03-09 RX ADMIN — IBUPROFEN 600 MG: 600 TABLET, FILM COATED ORAL at 07:03

## 2025-03-09 RX ADMIN — DEXAMETHASONE SODIUM PHOSPHATE 8 MG: 4 INJECTION, SOLUTION INTRA-ARTICULAR; INTRALESIONAL; INTRAMUSCULAR; INTRAVENOUS; SOFT TISSUE at 08:03

## 2025-03-09 RX ADMIN — Medication 6 MG: at 10:03

## 2025-03-09 RX ADMIN — MUPIROCIN: 20 OINTMENT TOPICAL at 08:03

## 2025-03-09 RX ADMIN — ZOLPIDEM TARTRATE 5 MG: 5 TABLET, FILM COATED ORAL at 11:03

## 2025-03-09 RX ADMIN — POTASSIUM CHLORIDE 20 MEQ: 1500 TABLET, EXTENDED RELEASE ORAL at 07:03

## 2025-03-09 RX ADMIN — BUDESONIDE 3 MG: 3 CAPSULE, COATED PELLETS ORAL at 08:03

## 2025-03-09 RX ADMIN — HYDROCODONE BITARTRATE AND ACETAMINOPHEN 1 TABLET: 7.5; 325 TABLET ORAL at 07:03

## 2025-03-09 RX ADMIN — GABAPENTIN 300 MG: 300 CAPSULE ORAL at 08:03

## 2025-03-09 NOTE — ED PROVIDER NOTES
Encounter Date: 3/9/2025    SCRIBE #1 NOTE: I, Ruthie Yoon, am scribing for, and in the presence of,  Luis Manuel Anderson IV, MD. I have scribed the following portions of the note - the EKG reading. Other sections scribed: HPI, ROS, PE.       History     Chief Complaint   Patient presents with    Shortness of Breath     Sob, back pain, sore throat states sx for past 5 months.  States symptoms worse at night.   States he is prescribed home 02 but unable to get it.  Hx of copd.        Headache    Sore Throat     Patient is a 64 year old male with a history of DM, HTN, COPD, Interstitial Lung Disease, and GERD presenting to the ED for SOB, generalized pain, and sore throat for the past 5 months, worse today. Patient claims he began having body aches today as well. Symptoms worse at night. Patient endorses neck pain, bilateral knee pain, and body aches. Patient claims he is prescribed at home oxygen but is unable to get it through [employer]. [employer] network claims he did not return his last administered oxygen tank. Patient's relative states he can barely breathe when walking to the bathroom. Patient's internist is Dr. Sarabjit Arreaga.     The history is provided by the patient, a relative and medical records. No  was used.     Review of patient's allergies indicates:  No Known Allergies  Past Medical History:   Diagnosis Date    ADELA positive     Antisynthetase syndrome     Diabetes mellitus     GERD (gastroesophageal reflux disease)     Herpes zoster     Hypertension     ILD (interstitial lung disease)     Lymphadenopathy     Morbid obesity     Osteoarthritis of right knee     Rheumatoid arthritis     Snoring     Supraclavicular mass     Thyroid nodule     Venous insufficiency of lower extremity      Past Surgical History:   Procedure Laterality Date    Drainage of right thyroid gland lobe  10/07/2021    Laser surgery left leg Left     Right hand surgery       Family History   Problem Relation Name Age of  Onset    Heart disease Mother      Breast cancer Mother      Heart attack Mother       Social History[1]  Review of Systems   Constitutional:  Negative for chills and fever.        Generalized pain.      HENT:  Positive for sore throat. Negative for congestion and rhinorrhea.    Eyes:  Negative for visual disturbance.   Respiratory:  Positive for shortness of breath. Negative for cough.    Cardiovascular:  Negative for chest pain.   Gastrointestinal:  Negative for abdominal pain, nausea and vomiting.   Genitourinary:  Negative for dysuria and hematuria.   Musculoskeletal:  Positive for myalgias and neck pain. Negative for joint swelling.        Bilateral knee pain.    Skin:  Negative for rash.   Neurological:  Negative for weakness.   Psychiatric/Behavioral:  Negative for confusion.    All other systems reviewed and are negative.      Physical Exam     Initial Vitals [03/09/25 0637]   BP Pulse Resp Temp SpO2   115/75 (!) 122 (!) 23 99.2 °F (37.3 °C) (!) 85 %      MAP       --         Physical Exam    Nursing note and vitals reviewed.  Constitutional: He is not diaphoretic. No distress.   Chronically ill appearing.    HENT:   Head: Normocephalic and atraumatic.   Neck: Neck supple.   Normal range of motion.  Cardiovascular:  Normal rate and regular rhythm.           Pulmonary/Chest: Tachypnea noted.   Hypoxic requiring supplemental oxygen.   Crackles in all lung fields     Abdominal: Abdomen is soft. He exhibits no distension. There is no abdominal tenderness.   Musculoskeletal:         General: No edema.      Cervical back: Normal range of motion and neck supple.     Neurological: He is alert and oriented to person, place, and time. He has normal strength. No cranial nerve deficit or sensory deficit.   Skin: Skin is warm. Capillary refill takes less than 2 seconds.   Psychiatric: He has a normal mood and affect.         ED Course   Critical Care    Date/Time: 3/9/2025 8:00 AM    Performed by: Luis Manuel Anderson IV,  MD  Authorized by: Luis Manuel Anderson IV, MD  Direct patient critical care time: 8 minutes  Additional history critical care time: 9 minutes  Ordering / reviewing critical care time: 7 minutes  Documentation critical care time: 8 minutes  Consulting other physicians critical care time: 9 minutes  Total critical care time (exclusive of procedural time) : 41 minutes  Critical care time was exclusive of separately billable procedures and treating other patients.  Critical care was necessary to treat or prevent imminent or life-threatening deterioration of the following conditions: respiratory failure.  Critical care was time spent personally by me on the following activities: blood draw for specimens, development of treatment plan with patient or surrogate, discussions with consultants, evaluation of patient's response to treatment, interpretation of cardiac output measurements, examination of patient, obtaining history from patient or surrogate, ordering and performing treatments and interventions, ordering and review of radiographic studies, ordering and review of laboratory studies, pulse oximetry, re-evaluation of patient's condition and review of old charts.        Labs Reviewed   COVID/RSV/FLU A&B PCR - Abnormal       Result Value    Influenza A PCR Not Detected      Influenza B PCR Not Detected      Respiratory Syncytial Virus PCR Not Detected      SARS-CoV-2 PCR Detected (*)     Narrative:     The Xpert Xpress SARS-CoV-2/FLU/RSV plus is a rapid, multiplexed real-time PCR test intended for the simultaneous qualitative detection and differentiation of SARS-CoV-2, Influenza A, Influenza B, and respiratory syncytial virus (RSV) viral RNA in either nasopharyngeal swab or nasal swab specimens.         COMPREHENSIVE METABOLIC PANEL - Abnormal    Sodium 136      Potassium 3.5      Chloride 98      CO2 26      Glucose 117 (*)     Blood Urea Nitrogen 16.4      Creatinine 0.92      Calcium 8.5 (*)     Protein Total 7.8 (*)      Albumin 2.6 (*)     Globulin 5.2 (*)     Albumin/Globulin Ratio 0.5 (*)     Bilirubin Total 0.6            ALT 13      AST 13      eGFR >60      Anion Gap 12.0      BUN/Creatinine Ratio 18     CBC WITH DIFFERENTIAL - Abnormal    WBC 7.00      RBC 4.16 (*)     Hgb 11.2 (*)     Hct 33.6 (*)     MCV 80.8      MCH 26.9 (*)     MCHC 33.3      RDW 15.1      Platelet 357      MPV 10.1      Neut % 75.1      Lymph % 8.4      Mono % 13.4      Eos % 1.6      Basophil % 0.6      Imm Grans % 0.9      Neut # 5.26      Lymph # 0.59 (*)     Mono # 0.94      Eos # 0.11      Baso # 0.04      Imm Gran # 0.06 (*)     NRBC% 0.0     B-TYPE NATRIURETIC PEPTIDE - Normal    Natriuretic Peptide 52.0     TROPONIN I - Normal    Troponin-I 0.020     CBC W/ AUTO DIFFERENTIAL    Narrative:     The following orders were created for panel order CBC auto differential.  Procedure                               Abnormality         Status                     ---------                               -----------         ------                     CBC with Differential[[id number]]       Abnormal            Final result                 Please view results for these tests on the individual orders.     EKG Readings: (Independently Interpreted)   Initial Reading: No STEMI. Rhythm: Sinus Tachycardia. Heart Rate: 120. Ectopy: No Ectopy. Conduction: Non specific intraventricular block. ST Segments: Normal ST Segments. T Waves: Normal. Axis: Normal. Clinical Impression: Sinus Tachycardia   639       Imaging Results              X-Ray Chest AP Portable (In process)                      Medications   remdesivir 200 mg in 0.9% NaCl 250 mL infusion (has no administration in time range)     Followed by   remdesivir 100 mg in 0.9% NaCl 250 mL infusion (has no administration in time range)   sodium chloride 0.9% flush 10 mL (has no administration in time range)   melatonin tablet 6 mg (has no administration in time range)   ondansetron injection 4 mg (has no  administration in time range)   morphine injection 4 mg (4 mg Intravenous Given 3/9/25 0734)   ondansetron injection 4 mg (4 mg Intravenous Given 3/9/25 0734)   dexAMETHasone injection 8 mg (8 mg Intravenous Given 3/9/25 0800)     Medical Decision Making  65 yo with ILD presenting with worsening SOB, supposed to be on home O2 but can't due to financial issues   Exam as above, has bilateral crackles  COVID positive   Will treat with decadron, remdesivir, admit to PCP Dr. Saleh at this time     Differential diagnosis (including but not limited to):   Judging by the patient's chief complaint and pertinent history, the patient has the following possible differential diagnoses, including but not limited to the following.  Some of these are deemed to be lower likelihood and some more likely based on my physical exam and history combined with possible lab work and/or imaging studies.   Please see the pertinent studies, and refer to the HPI.  Some of these diagnoses will take further evaluation to fully rule out, perhaps as an outpatient and the patient was encouraged to follow up when discharged for more comprehensive evaluation.    ACS, pneumonia, COVID/Flu, congestive heart failure, asthma, COPD, pleural effusion, pulmonary edema, acute bronchitis, PE, pneumothorax, hemothorax, aortic dissection, electrolyte abnormalities, anemia, anxiety       Problems Addressed:  COVID-19: acute illness or injury that poses a threat to life or bodily functions  Hypoxia: acute illness or injury that poses a threat to life or bodily functions    Amount and/or Complexity of Data Reviewed  Independent Historian: friend     Details: PCP is malinda saleh   External Data Reviewed: notes.  Labs: ordered.  Radiology: ordered and independent interpretation performed.     Details: CXR - bilateral infiltrates   ECG/medicine tests: ordered and independent interpretation performed.     Details: EKG Readings: (Independently Interpreted)   Initial  Reading: No STEMI. Rhythm: Sinus Tachycardia. Heart Rate: 120. Ectopy: No Ectopy. Conduction: Non specific intraventricular block. ST Segments: Normal ST Segments. T Waves: Normal. Axis: Normal. Clinical Impression: Sinus Tachycardia   639   Discussion of management or test interpretation with external provider(s): Discussed with Dr. Gibson evans admit     Risk  OTC drugs.  Prescription drug management.  Drug therapy requiring intensive monitoring for toxicity.  Decision regarding hospitalization.            Scribe Attestation:   Scribe #1: I performed the above scribed service and the documentation accurately describes the services I performed. I attest to the accuracy of the note.    Attending Attestation:           Physician Attestation for Scribe:  Physician Attestation Statement for Scribe #1: I, Luis Manuel Anderson IV, MD, reviewed documentation, as scribed by Ruthie Yoon in my presence, and it is both accurate and complete.             ED Course as of 03/09/25 0803   Sun Mar 09, 2025   0743 SARS-CoV2 (COVID-19) Qualitative PCR(!): Detected [AC]   0751 [facility] medicine.  [KL]   0758 Dr. Gibson evans admit   [AC]      ED Course User Index  [AC] Luis Manuel Anderson IV, MD  [KL] Ruthie Yoon                           Clinical Impression:  Final diagnoses:  [U07.1] COVID-19 (Primary)  [R09.02] Hypoxia          ED Disposition Condition    Admit                   [1]   Social History  Tobacco Use    Smoking status: Every Day     Current packs/day: 0.25     Types: Cigarettes    Smokeless tobacco: Never    Tobacco comments:     Only about 2 cigarettes a day    Substance Use Topics    Alcohol use: Yes     Comment: One beer a month    Drug use: Yes     Types: Marijuana     Comment: Occasionally        Luis Manuel Anderson IV, MD  03/09/25 0803

## 2025-03-10 PROCEDURE — 21400001 HC TELEMETRY ROOM

## 2025-03-10 PROCEDURE — 25000242 PHARM REV CODE 250 ALT 637 W/ HCPCS: Performed by: INTERNAL MEDICINE

## 2025-03-10 PROCEDURE — 25000003 PHARM REV CODE 250: Performed by: INTERNAL MEDICINE

## 2025-03-10 PROCEDURE — 27000221 HC OXYGEN, UP TO 24 HOURS

## 2025-03-10 PROCEDURE — 99900035 HC TECH TIME PER 15 MIN (STAT)

## 2025-03-10 PROCEDURE — 94760 N-INVAS EAR/PLS OXIMETRY 1: CPT

## 2025-03-10 PROCEDURE — 27000207 HC ISOLATION

## 2025-03-10 PROCEDURE — 63600175 PHARM REV CODE 636 W HCPCS: Mod: JZ | Performed by: INTERNAL MEDICINE

## 2025-03-10 RX ADMIN — CETIRIZINE HYDROCHLORIDE 5 MG: 5 SOLUTION ORAL at 09:03

## 2025-03-10 RX ADMIN — FLUTICASONE FUROATE AND VILANTEROL TRIFENATATE 1 PUFF: 100; 25 POWDER RESPIRATORY (INHALATION) at 09:03

## 2025-03-10 RX ADMIN — OLANZAPINE 10 MG: 5 TABLET, FILM COATED ORAL at 09:03

## 2025-03-10 RX ADMIN — REMDESIVIR 100 MG: 100 INJECTION, POWDER, LYOPHILIZED, FOR SOLUTION INTRAVENOUS at 09:03

## 2025-03-10 RX ADMIN — GABAPENTIN 300 MG: 300 CAPSULE ORAL at 03:03

## 2025-03-10 RX ADMIN — GABAPENTIN 300 MG: 300 CAPSULE ORAL at 08:03

## 2025-03-10 RX ADMIN — BUDESONIDE 3 MG: 3 CAPSULE, COATED PELLETS ORAL at 08:03

## 2025-03-10 RX ADMIN — IBUPROFEN 600 MG: 600 TABLET, FILM COATED ORAL at 05:03

## 2025-03-10 RX ADMIN — IBUPROFEN 600 MG: 600 TABLET, FILM COATED ORAL at 12:03

## 2025-03-10 RX ADMIN — LISINOPRIL 20 MG: 20 TABLET ORAL at 09:03

## 2025-03-10 RX ADMIN — FUROSEMIDE 20 MG: 20 TABLET ORAL at 05:03

## 2025-03-10 RX ADMIN — PANTOPRAZOLE SODIUM 40 MG: 40 TABLET, DELAYED RELEASE ORAL at 09:03

## 2025-03-10 RX ADMIN — HYDROCHLOROTHIAZIDE 12.5 MG: 12.5 TABLET ORAL at 09:03

## 2025-03-10 RX ADMIN — MUPIROCIN: 20 OINTMENT TOPICAL at 09:03

## 2025-03-10 RX ADMIN — FLUTICASONE PROPIONATE 50 MCG: 50 SPRAY, METERED NASAL at 09:03

## 2025-03-10 RX ADMIN — GABAPENTIN 300 MG: 300 CAPSULE ORAL at 09:03

## 2025-03-10 RX ADMIN — THERA TABS 1 TABLET: TAB at 09:03

## 2025-03-10 RX ADMIN — ASPIRIN 81 MG: 81 TABLET, COATED ORAL at 09:03

## 2025-03-10 RX ADMIN — BUDESONIDE 3 MG: 3 CAPSULE, COATED PELLETS ORAL at 09:03

## 2025-03-10 RX ADMIN — MUPIROCIN: 20 OINTMENT TOPICAL at 08:03

## 2025-03-10 RX ADMIN — FLUTICASONE PROPIONATE 50 MCG: 50 SPRAY, METERED NASAL at 08:03

## 2025-03-10 RX ADMIN — LACTULOSE 20 G: 10 SOLUTION ORAL at 09:03

## 2025-03-10 NOTE — PROGRESS NOTES
[facility]                       [address] [address]    PATIENT NAME:       ЮЛИЯ NUNEZ   YOB: 1960  CSN:                [id number]   MRN:                [id number]  ADMIT DATE:         03/09/2025 06:48:00  PHYSICIAN:          Sarabjit rAreaga MD                            PROGRESS NOTE    DATE:  03/10/2025 00:00:00    SUBJECTIVE:  The patient is a 64-year-old  male, who lives with   his daughter.  It appeared that the daughter had contacted COVID and the patient   definitely has been having shortness of breath and generalized malaise and body   ache and came to the emergency room and he was tested positive for COVID.    Therefore, the patient is admitted and was placed on remdesivir.  At this point,   the patient is oxygen dependent, but claimed that he did not have oxygen at   home, so I talked to the  to do the best to see if the patient will   be eligible to go home, to find the company who will provide the patient the   oxygen at home.    OBJECTIVE:  GENERAL:  Today, on examination, he is alert, oriented, a little   less short of breath.  HEART:  The patient has S1, S2.  No murmur or gallop.  CHEST:  Few rhonchi.  ABDOMEN:  Soft, nontender.  Good bowel sounds.  EXTREMITY:  Superior and inferior good range of motion.    IMPRESSION:  The patient has:  1. COVID-19 infection.  2. Hypoxia.  3. Diabetes mellitus type 2.  4. Chronic interstitial lung disease.  5. Hypertension.  6. Osteoarthritis.    PLAN:  We will continue present care, and hopefully at the time of discharge, he   will have the oxygen to go home.      ______________________________  Sarabjit Arreaga MD    CHL/AQS  DD:  03/10/2025  Time:  04:18PM  DT:  03/10/2025  Time:  04:36PM  Job #:  [id number]      PROGRESS NOTE

## 2025-03-10 NOTE — PLAN OF CARE
Problem: Adult Inpatient Plan of Care  Goal: Plan of Care Review  Outcome: Progressing  Goal: Patient-Specific Goal (Individualized)  Outcome: Progressing  Goal: Absence of Hospital-Acquired Illness or Injury  Outcome: Progressing  Goal: Optimal Comfort and Wellbeing  Outcome: Progressing  Goal: Readiness for Transition of Care  Outcome: Progressing     Problem: Diabetes Comorbidity  Goal: Blood Glucose Level Within Targeted Range  Outcome: Progressing     Problem: Pneumonia  Goal: Fluid Balance  Outcome: Progressing  Goal: Resolution of Infection Signs and Symptoms  Outcome: Progressing  Goal: Effective Oxygenation and Ventilation  Outcome: Progressing     Problem: Infection  Goal: Absence of Infection Signs and Symptoms  Outcome: Progressing

## 2025-03-10 NOTE — PLAN OF CARE
03/10/25 1218   Discharge Assessment   Assessment Type Discharge Planning Assessment   Confirmed/corrected address, phone number and insurance Yes   Confirmed Demographics Correct on Facesheet   Source of Information patient   When was your last doctors appointment?   (last month)   Communicated DAISY with patient/caregiver Date not available/Unable to determine   Reason For Admission SOB   People in Home alone   Do you expect to return to your current living situation? Yes  (Staying with daughter)   Do you have help at home or someone to help you manage your care at home? Yes   Who are your caregiver(s) and their phone number(s)? Lindsay Meng (Daughter)  [phone]   Prior to hospitilization cognitive status: Alert/Oriented   Current cognitive status: Alert/Oriented   Walking or Climbing Stairs Difficulty yes   Walking or Climbing Stairs ambulation difficulty, requires equipment   Mobility Management wc-broken   Dressing/Bathing Difficulty yes   Dressing/Bathing bathing difficulty, requires equipment   Dressing/Bathing Management sc   Equipment Currently Used at Home shower chair;grab bar;wheelchair   Readmission within 30 days? No   Patient currently being followed by outpatient case management? No   Do you currently have service(s) that help you manage your care at home? No   Do you take prescription medications? Yes   Do you have prescription coverage? Yes   Coverage [employer]   Do you have any problems affording any of your prescribed medications? No   Is the patient taking medications as prescribed? yes   Who is going to help you get home at discharge? Lindsay Meng (Daughter)  [phone]   How do you get to doctors appointments? car, drives self   Are you on dialysis? No   Do you take coumadin? No   Discharge Plan A Home with family   Discharge Plan B Home with family   DME Needed Upon Discharge  other (see comments)  (tbd)   Discharge Plan discussed with: Patient   Transition of Care Barriers None    Physical Activity   On average, how many days per week do you engage in moderate to strenuous exercise (like a brisk walk)? 0 days   On average, how many minutes do you engage in exercise at this level? 0 min   Financial Resource Strain   How hard is it for you to pay for the very basics like food, housing, medical care, and heating? Not very   Housing Stability   In the last 12 months, was there a time when you were not able to pay the mortgage or rent on time? N   At any time in the past 12 months, were you homeless or living in a shelter (including now)? N   Transportation Needs   Has the lack of transportation kept you from medical appointments, meetings, work or from getting things needed for daily living? No   Food Insecurity   Within the past 12 months, you worried that your food would run out before you got the money to buy more. Never true   Within the past 12 months, the food you bought just didn't last and you didn't have money to get more. Never true   Stress   Do you feel stress - tense, restless, nervous, or anxious, or unable to sleep at night because your mind is troubled all the time - these days? Very much   Social Isolation   How often do you feel lonely or isolated from those around you?  Sometimes   Alcohol Use   Q1: How often do you have a drink containing alcohol? Never   [employer]   In the past 12 months has the electric, gas, oil, or water company threatened to shut off services in your home? No   Health Literacy   How often do you need to have someone help you when you read instructions, pamphlets, or other written material from your doctor or pharmacy? Rarely

## 2025-03-10 NOTE — H&P
[facility]                        [address] [address]    PATIENT NAME:       ЮЛИЯ NUNEZ   YOB: 1960  CSN:                [id number]   MRN:                [id number]  ADMIT DATE:         03/09/2025 06:48:00  PHYSICIAN:          Sarabjit Arreaga MD                        HISTORY AND PHYSICAL      CHIEF COMPLAINT:  Shortness of breath, sore throat, and body aches including   back.    HISTORY OF PRESENT ILLNESS:  The patient is a 64-year-old  male   with multiple medical condition including diabetes, COPD, interstitial lung   disease, hypertension, and gastroesophageal reflex, stated that for the past few   days, he has been having some increasing shortness of breath and sore throat   and also complaining of body aches with getting worse.  However, the patient has   been feeling worse again on a daily basis.  The patient just let me know that   he has been living with his daughter who found out also that he has COVID.  At   this point, the patient came to the emergency room, was attended by the ER   physician, and test was done and the patient was found to have positive for   COVID, SARS COVID2.  At this point, the patient was started on remdesivir and we   admitted the patient for further therapy.    PAST MEDICAL HISTORY:  Significant for hypertension, diabetes mellitus type 2,   chronic interstitial lung disease, gastroesophageal reflux.  Also the patient   has a history of sinusitis and osteoarthritis and also has venous insufficiency   of the lower extremity.    SOCIAL HISTORY:  Patient is single, has 2 children, 1 boy and 1 girl.  He stated   that he completed 11th grade at school.  He does not drink, but smokes 1 pack   cigarettes a day.  Denies IV drug abuse.    FAMILY HISTORY:  Significant for diabetes mellitus, hypertension, and cancer.    PAST SURGICAL HISTORY:  The patient had varicose vein  surgery in the left leg   and he has hand surgery in both hands.    ALLERGIES:  NO KNOWN ALLERGIES.     CURRENT MEDICATIONS:  The patient is on albuterol Proventil 2 puffs q.4 hours   p.r.n.  Advair HFA 2 puff once a day.  He is on Lasix 20 mg once a day,   potassium 10 mEq a day, lisinopril/HCTZ 20/25 mg once a day, zolpidem 10 mg at   night, omeprazole 40 mg once a day, Restasis 0.05% ophthalmic 1 drop in both   eyes twice a day, he is taking gabapentin 3 times a day.  He is from   time-to-time taking tramadol or Norco.    REVIEW OF SYSTEMS:  CARDIOVASCULAR SYSTEM:  Negative for chest pain.  RESPIRATORY SYSTEM:  Severe shortness of breath.  GASTROINTESTINAL:  No diarrhea or vomiting.  ENDOCRINOLOGIC SYSTEM:  Diabetes mellitus.  No thyroid disorder.  NEUROLOGICAL EXAMINATION:  No focal neurological deficit.    PHYSICAL EXAMINATION:  GENERAL:  The patient is alert with some shortness of breath.  VITAL SIGNS:  At the time I have seen the patient, he had a blood pressure of   136/89, pulse 122, respiration 28, temperature 99.2.  HEENT:  Head:  Normocephalic with no acute trauma to the head.  Both eyes pupils   react to light and accommodation seem to be satisfactory.  Ear, nose, and   throat:  The ear, no pathology.  Nose, no pathology.  Throat, the patient   complaining of sore throat, but no exudate.  NECK:  Supple.  No JVD or adenopathy.  However, patient complaining sometime   when he turned his neck to the left, he had some headache.  HEART:  The patient has S1 and S2.  No murmur or gallop.  PMI is not displaced.  CHEST:  Basically has bilateral wheezing.  ABDOMEN:  Soft and nontender.  Good bowel sounds.  EXTREMITIES:  Extremities superior with good range of motion.  Extremity   inferior, there is minimal edema in the right leg and knee.  Both knee painful.    However, no clubbing or cyanosis noted.  NEUROLOGICAL EXAMINATION:  No focal neurological deficit.  Cranial nerves 2   through 12 appear to be  intact.    LABORATORY DATA:  The patient has a hemoglobin of 11.2, hematocrit 32.6.  Sodium   136, potassium 3.5.  BUN 16.4, creatinine 0.92.    DIAGNOSTIC STUDIES:  A chest x-ray revealed that the patient has chronic   interstitial lung disease.    IMPRESSION:    1. Patient has COVID-19.  2. Hypoxia.  3. Diabetes mellitus type 2.  4. Hypertension.  5. Chronic interstitial lung disease.  6. Osteoarthritis.    PLAN:  The patient is on remdesivir and oxygen and we will monitor the patient   on daily basis.  Hopefully, he will get better.        ______________________________  Sarabjit Arreaga MD    CHL/AQS  DD:  03/09/2025  Time:  06:44PM  DT:  03/09/2025  Time:  08:31PM  Job #:  [id number]      HISTORY AND PHYSICAL

## 2025-03-11 PROCEDURE — 63600175 PHARM REV CODE 636 W HCPCS: Mod: JZ | Performed by: INTERNAL MEDICINE

## 2025-03-11 PROCEDURE — 21400001 HC TELEMETRY ROOM

## 2025-03-11 PROCEDURE — 25000003 PHARM REV CODE 250: Performed by: INTERNAL MEDICINE

## 2025-03-11 PROCEDURE — 25000242 PHARM REV CODE 250 ALT 637 W/ HCPCS: Performed by: INTERNAL MEDICINE

## 2025-03-11 PROCEDURE — 27000221 HC OXYGEN, UP TO 24 HOURS

## 2025-03-11 PROCEDURE — 99900035 HC TECH TIME PER 15 MIN (STAT)

## 2025-03-11 PROCEDURE — 27000207 HC ISOLATION

## 2025-03-11 PROCEDURE — 94760 N-INVAS EAR/PLS OXIMETRY 1: CPT

## 2025-03-11 RX ADMIN — REMDESIVIR 100 MG: 100 INJECTION, POWDER, LYOPHILIZED, FOR SOLUTION INTRAVENOUS at 10:03

## 2025-03-11 RX ADMIN — MUPIROCIN: 20 OINTMENT TOPICAL at 08:03

## 2025-03-11 RX ADMIN — LISINOPRIL 20 MG: 20 TABLET ORAL at 10:03

## 2025-03-11 RX ADMIN — GABAPENTIN 300 MG: 300 CAPSULE ORAL at 05:03

## 2025-03-11 RX ADMIN — IBUPROFEN 600 MG: 600 TABLET, FILM COATED ORAL at 12:03

## 2025-03-11 RX ADMIN — ASPIRIN 81 MG: 81 TABLET, COATED ORAL at 11:03

## 2025-03-11 RX ADMIN — OLANZAPINE 10 MG: 5 TABLET, FILM COATED ORAL at 10:03

## 2025-03-11 RX ADMIN — IBUPROFEN 600 MG: 600 TABLET, FILM COATED ORAL at 05:03

## 2025-03-11 RX ADMIN — FLUTICASONE FUROATE AND VILANTEROL TRIFENATATE 1 PUFF: 100; 25 POWDER RESPIRATORY (INHALATION) at 10:03

## 2025-03-11 RX ADMIN — ZOLPIDEM TARTRATE 5 MG: 5 TABLET, FILM COATED ORAL at 04:03

## 2025-03-11 RX ADMIN — BUDESONIDE 3 MG: 3 CAPSULE, COATED PELLETS ORAL at 08:03

## 2025-03-11 RX ADMIN — GABAPENTIN 300 MG: 300 CAPSULE ORAL at 08:03

## 2025-03-11 RX ADMIN — THERA TABS 1 TABLET: TAB at 10:03

## 2025-03-11 RX ADMIN — HYDROCHLOROTHIAZIDE 12.5 MG: 12.5 TABLET ORAL at 10:03

## 2025-03-11 RX ADMIN — FUROSEMIDE 20 MG: 20 TABLET ORAL at 05:03

## 2025-03-11 RX ADMIN — HYDROCODONE BITARTRATE AND ACETAMINOPHEN 1 TABLET: 7.5; 325 TABLET ORAL at 05:03

## 2025-03-11 RX ADMIN — IBUPROFEN 600 MG: 600 TABLET, FILM COATED ORAL at 11:03

## 2025-03-11 RX ADMIN — CETIRIZINE HYDROCHLORIDE 5 MG: 5 SOLUTION ORAL at 10:03

## 2025-03-11 RX ADMIN — LACTULOSE 20 G: 10 SOLUTION ORAL at 10:03

## 2025-03-11 RX ADMIN — FLUTICASONE PROPIONATE 50 MCG: 50 SPRAY, METERED NASAL at 08:03

## 2025-03-11 RX ADMIN — HYDROCODONE BITARTRATE AND ACETAMINOPHEN 1 TABLET: 7.5; 325 TABLET ORAL at 04:03

## 2025-03-11 RX ADMIN — FLUTICASONE PROPIONATE 50 MCG: 50 SPRAY, METERED NASAL at 10:03

## 2025-03-11 RX ADMIN — MUPIROCIN: 20 OINTMENT TOPICAL at 10:03

## 2025-03-11 RX ADMIN — GABAPENTIN 300 MG: 300 CAPSULE ORAL at 10:03

## 2025-03-11 RX ADMIN — PANTOPRAZOLE SODIUM 40 MG: 40 TABLET, DELAYED RELEASE ORAL at 10:03

## 2025-03-11 RX ADMIN — BUDESONIDE 3 MG: 3 CAPSULE, COATED PELLETS ORAL at 10:03

## 2025-03-11 NOTE — PROGRESS NOTES
[facility] [address] [address]    PATIENT NAME:       ЮЛИЯ NUNEZ   YOB: 1960  CSN:                [id number]   MRN:                [id number]  ADMIT DATE:         03/09/2025 06:48:00  PHYSICIAN:          Sarabjit Arreaga MD                            PROGRESS NOTE    DATE:      SUBJECTIVE:  A 64-year-old  male, admitted with a diagnosis of   COVID-19 and has been having bilateral rhonchi.    OBJECTIVE:  ABDOMEN:  Soft, nontender.  Good bowel sounds.  EXTREMITIES:  Superior and inferior good range of motion.    IMPRESSION:    1. COVID-19 infection.  2. Hypoxia.  3. Hypertension.    4. Diabetes mellitus type 2.  5. Chronic interstitial lung disease.    PLAN:  We will continue present care.  Hopefully, the patient will continue to   improve after completed.        ______________________________  Sarabjit Arreaga MD    CHL/AQS  DD:  03/11/2025  Time:  01:44PM  DT:  03/11/2025  Time:  01:59PM  Job #:  [id number]      PROGRESS NOTE

## 2025-03-12 PROCEDURE — 25000003 PHARM REV CODE 250: Performed by: STUDENT IN AN ORGANIZED HEALTH CARE EDUCATION/TRAINING PROGRAM

## 2025-03-12 PROCEDURE — 25000003 PHARM REV CODE 250: Performed by: INTERNAL MEDICINE

## 2025-03-12 PROCEDURE — 27000207 HC ISOLATION

## 2025-03-12 PROCEDURE — 25000242 PHARM REV CODE 250 ALT 637 W/ HCPCS: Performed by: INTERNAL MEDICINE

## 2025-03-12 PROCEDURE — 21400001 HC TELEMETRY ROOM

## 2025-03-12 PROCEDURE — 63600175 PHARM REV CODE 636 W HCPCS: Mod: JZ | Performed by: INTERNAL MEDICINE

## 2025-03-12 RX ADMIN — BUDESONIDE 3 MG: 3 CAPSULE, COATED PELLETS ORAL at 08:03

## 2025-03-12 RX ADMIN — HYDROCODONE BITARTRATE AND ACETAMINOPHEN 1 TABLET: 7.5; 325 TABLET ORAL at 08:03

## 2025-03-12 RX ADMIN — MUPIROCIN: 20 OINTMENT TOPICAL at 09:03

## 2025-03-12 RX ADMIN — GABAPENTIN 300 MG: 300 CAPSULE ORAL at 08:03

## 2025-03-12 RX ADMIN — ZOLPIDEM TARTRATE 5 MG: 5 TABLET, FILM COATED ORAL at 12:03

## 2025-03-12 RX ADMIN — REMDESIVIR 100 MG: 100 INJECTION, POWDER, LYOPHILIZED, FOR SOLUTION INTRAVENOUS at 08:03

## 2025-03-12 RX ADMIN — FUROSEMIDE 20 MG: 20 TABLET ORAL at 05:03

## 2025-03-12 RX ADMIN — LACTULOSE 20 G: 10 SOLUTION ORAL at 08:03

## 2025-03-12 RX ADMIN — PANTOPRAZOLE SODIUM 40 MG: 40 TABLET, DELAYED RELEASE ORAL at 08:03

## 2025-03-12 RX ADMIN — FLUTICASONE PROPIONATE 50 MCG: 50 SPRAY, METERED NASAL at 08:03

## 2025-03-12 RX ADMIN — MUPIROCIN: 20 OINTMENT TOPICAL at 08:03

## 2025-03-12 RX ADMIN — IBUPROFEN 600 MG: 600 TABLET, FILM COATED ORAL at 05:03

## 2025-03-12 RX ADMIN — LISINOPRIL 20 MG: 20 TABLET ORAL at 08:03

## 2025-03-12 RX ADMIN — HYDROCHLOROTHIAZIDE 12.5 MG: 12.5 TABLET ORAL at 07:03

## 2025-03-12 RX ADMIN — Medication 6 MG: at 08:03

## 2025-03-12 RX ADMIN — ASPIRIN 81 MG: 81 TABLET, COATED ORAL at 07:03

## 2025-03-12 RX ADMIN — ZOLPIDEM TARTRATE 5 MG: 5 TABLET, FILM COATED ORAL at 08:03

## 2025-03-12 RX ADMIN — BUDESONIDE 3 MG: 3 CAPSULE, COATED PELLETS ORAL at 07:03

## 2025-03-12 RX ADMIN — HYDROCODONE BITARTRATE AND ACETAMINOPHEN 1 TABLET: 7.5; 325 TABLET ORAL at 01:03

## 2025-03-12 RX ADMIN — FLUTICASONE PROPIONATE 50 MCG: 50 SPRAY, METERED NASAL at 09:03

## 2025-03-12 RX ADMIN — OLANZAPINE 10 MG: 5 TABLET, FILM COATED ORAL at 08:03

## 2025-03-12 RX ADMIN — FLUTICASONE FUROATE AND VILANTEROL TRIFENATATE 1 PUFF: 100; 25 POWDER RESPIRATORY (INHALATION) at 07:03

## 2025-03-12 RX ADMIN — IBUPROFEN 600 MG: 600 TABLET, FILM COATED ORAL at 12:03

## 2025-03-12 RX ADMIN — IBUPROFEN 600 MG: 600 TABLET, FILM COATED ORAL at 11:03

## 2025-03-12 RX ADMIN — THERA TABS 1 TABLET: TAB at 08:03

## 2025-03-12 RX ADMIN — GABAPENTIN 300 MG: 300 CAPSULE ORAL at 05:03

## 2025-03-12 RX ADMIN — CETIRIZINE HYDROCHLORIDE 5 MG: 5 SOLUTION ORAL at 07:03

## 2025-03-12 NOTE — PROGRESS NOTES
[facility]                       [address] [address]    PATIENT NAME:       ЮЛИЯ NUNEZ   YOB: 1960  CSN:                [id number]   MRN:                [id number]  ADMIT DATE:         03/09/2025 06:48:00  PHYSICIAN:          Sarabjit Arreaga MD                            PROGRESS NOTE    DATE:  03/12/2025 00:00:00    SUBJECTIVE:  The patient is a 64-year-old  male, admitted   because the patient was having shortness of breath and generalized malaise and   it was found out the patient was positive for COVID-19 and was started on   remdesivir.  At this point, the patient has one dose left of remdesivir.  If   completed, tomorrow I will discharge the patient home.    OBJECTIVE:  Today on examination,  GENERAL:  He is complaining of right knee pain, otherwise appeared to be stable.  HEART:  The patient has S1, S2.  No murmur or gallop.  CHEST:  Fairly clear.  No wheezing or rales.  ABDOMEN:  Soft, nontender.  Good bowel sounds.  EXTREMITIES:  Superior and inferior, the right knee is very tender and warm to   touch and painful.    IMPRESSION:    1. COVID-19 infection, hypoxia, improving.    2. Hypertension.  3. Diabetes mellitus type 2.  4. Chronic interstitial lung disease.    PLAN:  We are going to complete the remdesivir.  Probably if completed, tomorrow   the patient will be discharged to home.  However, the  is working   on oxygen at home because the patient is home-dependent oxygen.        ______________________________  Sarabjit Arreaga MD    CHL/AQS  DD:  03/12/2025  Time:  03:02PM  DT:  03/12/2025  Time:  03:43PM  Job #:  [id number]      PROGRESS NOTE       None

## 2025-03-13 VITALS
WEIGHT: 230 LBS | HEART RATE: 102 BPM | SYSTOLIC BLOOD PRESSURE: 121 MMHG | HEIGHT: 74 IN | BODY MASS INDEX: 29.52 KG/M2 | TEMPERATURE: 98 F | DIASTOLIC BLOOD PRESSURE: 76 MMHG | OXYGEN SATURATION: 97 % | RESPIRATION RATE: 21 BRPM

## 2025-03-13 PROBLEM — U07.1 COVID-19: Status: RESOLVED | Noted: 2025-03-09 | Resolved: 2025-03-13

## 2025-03-13 PROCEDURE — 25000242 PHARM REV CODE 250 ALT 637 W/ HCPCS: Performed by: INTERNAL MEDICINE

## 2025-03-13 PROCEDURE — 25000003 PHARM REV CODE 250: Performed by: INTERNAL MEDICINE

## 2025-03-13 PROCEDURE — 63600175 PHARM REV CODE 636 W HCPCS: Mod: JZ | Performed by: INTERNAL MEDICINE

## 2025-03-13 RX ADMIN — LISINOPRIL 20 MG: 20 TABLET ORAL at 10:03

## 2025-03-13 RX ADMIN — IBUPROFEN 600 MG: 600 TABLET, FILM COATED ORAL at 05:03

## 2025-03-13 RX ADMIN — IBUPROFEN 600 MG: 600 TABLET, FILM COATED ORAL at 11:03

## 2025-03-13 RX ADMIN — REMDESIVIR 100 MG: 100 INJECTION, POWDER, LYOPHILIZED, FOR SOLUTION INTRAVENOUS at 10:03

## 2025-03-13 RX ADMIN — CETIRIZINE HYDROCHLORIDE 5 MG: 5 SOLUTION ORAL at 10:03

## 2025-03-13 RX ADMIN — BUDESONIDE 3 MG: 3 CAPSULE, COATED PELLETS ORAL at 10:03

## 2025-03-13 RX ADMIN — PANTOPRAZOLE SODIUM 40 MG: 40 TABLET, DELAYED RELEASE ORAL at 10:03

## 2025-03-13 RX ADMIN — HYDROCHLOROTHIAZIDE 12.5 MG: 12.5 TABLET ORAL at 10:03

## 2025-03-13 RX ADMIN — GABAPENTIN 300 MG: 300 CAPSULE ORAL at 02:03

## 2025-03-13 RX ADMIN — FLUTICASONE PROPIONATE 50 MCG: 50 SPRAY, METERED NASAL at 10:03

## 2025-03-13 RX ADMIN — ASPIRIN 81 MG: 81 TABLET, COATED ORAL at 10:03

## 2025-03-13 RX ADMIN — OLANZAPINE 10 MG: 5 TABLET, FILM COATED ORAL at 10:03

## 2025-03-13 RX ADMIN — LACTULOSE 20 G: 10 SOLUTION ORAL at 10:03

## 2025-03-13 RX ADMIN — HYDROCODONE BITARTRATE AND ACETAMINOPHEN 1 TABLET: 7.5; 325 TABLET ORAL at 07:03

## 2025-03-13 RX ADMIN — THERA TABS 1 TABLET: TAB at 10:03

## 2025-03-13 RX ADMIN — FLUTICASONE FUROATE AND VILANTEROL TRIFENATATE 1 PUFF: 100; 25 POWDER RESPIRATORY (INHALATION) at 10:03

## 2025-03-13 RX ADMIN — GABAPENTIN 300 MG: 300 CAPSULE ORAL at 10:03

## 2025-03-13 RX ADMIN — MUPIROCIN: 20 OINTMENT TOPICAL at 10:03

## 2025-03-13 NOTE — PLAN OF CARE
Spoke with [facility], they are trying to get pt chart back active, they will f/u with pt tomorrow at his daughter house about the oxygen and wheelchair.

## 2025-03-13 NOTE — PLAN OF CARE
Oxygen referral sent to [facility] and wc referral sent to [employer]. Pt is unable to get 02 with [employer] d/t not returning previous equipment.

## 2025-03-13 NOTE — PLAN OF CARE
Received call back from [employer] company, its insurance is pulling up as HMO and they do not take it. Will notify MD.

## 2025-03-13 NOTE — PLAN OF CARE
Problem: Adult Inpatient Plan of Care  Goal: Plan of Care Review  3/13/2025 1255 by Chely Jacinto LPN  Outcome: Progressing  3/13/2025 1254 by Chely Jacinto LPN  Outcome: Progressing  Goal: Patient-Specific Goal (Individualized)  3/13/2025 1255 by Chely Jacinto LPN  Outcome: Progressing  3/13/2025 1254 by Chely Jacinto LPN  Outcome: Progressing  Goal: Absence of Hospital-Acquired Illness or Injury  3/13/2025 1255 by Chely Jacinto LPN  Outcome: Progressing  3/13/2025 1254 by Chely Jacinto LPN  Outcome: Progressing  Goal: Optimal Comfort and Wellbeing  3/13/2025 1255 by Chely Jacinto LPN  Outcome: Progressing  3/13/2025 1254 by Chely Jacinto LPN  Outcome: Progressing  Goal: Readiness for Transition of Care  3/13/2025 1255 by Chely Jacinto LPN  Outcome: Progressing  3/13/2025 1254 by Chely Jacinto LPN  Outcome: Progressing     Problem: Diabetes Comorbidity  Goal: Blood Glucose Level Within Targeted Range  3/13/2025 1255 by hCely Jacinto LPN  Outcome: Progressing  3/13/2025 1254 by Chely Jacinto LPN  Outcome: Progressing     Problem: Pneumonia  Goal: Fluid Balance  3/13/2025 1255 by Chely Jacinto LPN  Outcome: Progressing  3/13/2025 1254 by Chely Jacinto LPN  Outcome: Progressing  Goal: Resolution of Infection Signs and Symptoms  3/13/2025 1255 by Chely Jacinto LPN  Outcome: Progressing  3/13/2025 1254 by Chely Jacinto LPN  Outcome: Progressing  Goal: Effective Oxygenation and Ventilation  3/13/2025 1255 by Chely Jacinto LPN  Outcome: Progressing  3/13/2025 1254 by Chely Jacinto LPN  Outcome: Progressing     Problem: Infection  Goal: Absence of Infection Signs and Symptoms  3/13/2025 1255 by Chely Jacinto LPN  Outcome: Progressing  3/13/2025 1254 by Chely Jacinto LPN  Outcome: Progressing

## 2025-03-14 ENCOUNTER — PATIENT OUTREACH (OUTPATIENT)
Dept: ADMINISTRATIVE | Facility: CLINIC | Age: 65
End: 2025-03-14
Payer: MEDICARE

## 2025-03-14 ENCOUNTER — HOSPITAL ENCOUNTER (INPATIENT)
Facility: HOSPITAL | Age: 65
LOS: 3 days | Discharge: HOME HEALTH | DRG: 178 | End: 2025-03-17
Attending: EMERGENCY MEDICINE | Admitting: INTERNAL MEDICINE
Payer: MEDICARE

## 2025-03-14 DIAGNOSIS — U07.1 COVID-19: Primary | ICD-10-CM

## 2025-03-14 DIAGNOSIS — R29.818 ACUTE FOCAL NEUROLOGICAL DEFICIT: ICD-10-CM

## 2025-03-14 DIAGNOSIS — R53.81 PHYSICAL DECONDITIONING: ICD-10-CM

## 2025-03-14 LAB
ALBUMIN SERPL-MCNC: 2.4 G/DL (ref 3.4–4.8)
ALBUMIN/GLOB SERPL: 0.6 RATIO (ref 1.1–2)
ALLENS TEST BLOOD GAS (OHS): YES
ALP SERPL-CCNC: 90 UNIT/L (ref 40–150)
ALT SERPL-CCNC: 13 UNIT/L (ref 0–55)
ANION GAP SERPL CALC-SCNC: 9 MEQ/L
AST SERPL-CCNC: 13 UNIT/L (ref 5–34)
BASE EXCESS BLD CALC-SCNC: 8.5 MMOL/L (ref -2–2)
BASOPHILS # BLD AUTO: 0.04 X10(3)/MCL
BASOPHILS NFR BLD AUTO: 0.4 %
BILIRUB SERPL-MCNC: 0.5 MG/DL
BLOOD GAS SAMPLE TYPE (OHS): ABNORMAL
BUN SERPL-MCNC: 24.3 MG/DL (ref 8.4–25.7)
CA-I BLD-SCNC: 1.12 MMOL/L (ref 1.12–1.23)
CALCIUM SERPL-MCNC: 8 MG/DL (ref 8.8–10)
CHLORIDE SERPL-SCNC: 99 MMOL/L (ref 98–107)
CHOLEST SERPL-MCNC: 108 MG/DL
CHOLEST/HDLC SERPL: 3 {RATIO} (ref 0–5)
CO2 BLDA-SCNC: 34.8 MMOL/L
CO2 SERPL-SCNC: 29 MMOL/L (ref 23–31)
COHGB MFR BLDA: 1.4 % (ref 0.5–1.5)
CREAT SERPL-MCNC: 0.96 MG/DL (ref 0.72–1.25)
CREAT/UREA NIT SERPL: 25
DRAWN BY BLOOD GAS (OHS): ABNORMAL
EOSINOPHIL # BLD AUTO: 0.24 X10(3)/MCL (ref 0–0.9)
EOSINOPHIL NFR BLD AUTO: 2.4 %
ERYTHROCYTE [DISTWIDTH] IN BLOOD BY AUTOMATED COUNT: 15.4 % (ref 11.5–17)
GFR SERPLBLD CREATININE-BSD FMLA CKD-EPI: >60 ML/MIN/1.73/M2
GLOBULIN SER-MCNC: 4.1 GM/DL (ref 2.4–3.5)
GLUCOSE SERPL-MCNC: 113 MG/DL (ref 82–115)
HCO3 BLDA-SCNC: 33.4 MMOL/L (ref 22–26)
HCT VFR BLD AUTO: 33.4 % (ref 42–52)
HDLC SERPL-MCNC: 32 MG/DL (ref 35–60)
HGB BLD-MCNC: 10.8 G/DL (ref 14–18)
IMM GRANULOCYTES # BLD AUTO: 0.08 X10(3)/MCL (ref 0–0.04)
IMM GRANULOCYTES NFR BLD AUTO: 0.8 %
INR PPP: 1.3
LDLC SERPL CALC-MCNC: 66 MG/DL (ref 50–140)
LYMPHOCYTES # BLD AUTO: 1.47 X10(3)/MCL (ref 0.6–4.6)
LYMPHOCYTES NFR BLD AUTO: 14.4 %
MCH RBC QN AUTO: 26.5 PG (ref 27–31)
MCHC RBC AUTO-ENTMCNC: 32.3 G/DL (ref 33–36)
MCV RBC AUTO: 82.1 FL (ref 80–94)
METHGB MFR BLDA: 0.6 % (ref 0.4–1.5)
MONOCYTES # BLD AUTO: 1.2 X10(3)/MCL (ref 0.1–1.3)
MONOCYTES NFR BLD AUTO: 11.8 %
NEUTROPHILS # BLD AUTO: 7.15 X10(3)/MCL (ref 2.1–9.2)
NEUTROPHILS NFR BLD AUTO: 70.2 %
NRBC BLD AUTO-RTO: 0 %
O2 HB BLOOD GAS (OHS): 94.6 % (ref 94–97)
OHS QRS DURATION: 134 MS
OHS QTC CALCULATION: 477 MS
OXYGEN DEVICE BLOOD GAS (OHS): ABNORMAL
OXYHGB MFR BLDA: 11.1 G/DL (ref 12–16)
PCO2 BLDA: 47 MMHG (ref 35–45)
PH BLDA: 7.46 [PH] (ref 7.35–7.45)
PLATELET # BLD AUTO: 381 X10(3)/MCL (ref 130–400)
PMV BLD AUTO: 10.2 FL (ref 7.4–10.4)
PO2 BLDA: 80 MMHG (ref 80–100)
POCT GLUCOSE: 116 MG/DL (ref 70–110)
POTASSIUM BLOOD GAS (OHS): 4 MMOL/L (ref 3.5–5)
POTASSIUM SERPL-SCNC: 4.2 MMOL/L (ref 3.5–5.1)
PROT SERPL-MCNC: 6.5 GM/DL (ref 5.8–7.6)
PROTHROMBIN TIME: 15.6 SECONDS (ref 12.5–14.5)
RBC # BLD AUTO: 4.07 X10(6)/MCL (ref 4.7–6.1)
SAMPLE SITE BLOOD GAS (OHS): ABNORMAL
SAO2 % BLDA: 96.4 %
SODIUM BLOOD GAS (OHS): 135 MMOL/L (ref 137–145)
SODIUM SERPL-SCNC: 137 MMOL/L (ref 136–145)
TRIGL SERPL-MCNC: 49 MG/DL (ref 34–140)
TSH SERPL-ACNC: 0.79 UIU/ML (ref 0.35–4.94)
VLDLC SERPL CALC-MCNC: 10 MG/DL
WBC # BLD AUTO: 10.18 X10(3)/MCL (ref 4.5–11.5)

## 2025-03-14 PROCEDURE — 80053 COMPREHEN METABOLIC PANEL: CPT | Performed by: EMERGENCY MEDICINE

## 2025-03-14 PROCEDURE — 11000001 HC ACUTE MED/SURG PRIVATE ROOM

## 2025-03-14 PROCEDURE — 36600 WITHDRAWAL OF ARTERIAL BLOOD: CPT

## 2025-03-14 PROCEDURE — 63600175 PHARM REV CODE 636 W HCPCS: Performed by: EMERGENCY MEDICINE

## 2025-03-14 PROCEDURE — 25000003 PHARM REV CODE 250: Performed by: STUDENT IN AN ORGANIZED HEALTH CARE EDUCATION/TRAINING PROGRAM

## 2025-03-14 PROCEDURE — 99900035 HC TECH TIME PER 15 MIN (STAT)

## 2025-03-14 PROCEDURE — 99285 EMERGENCY DEPT VISIT HI MDM: CPT | Mod: 25

## 2025-03-14 PROCEDURE — 25500020 PHARM REV CODE 255: Performed by: EMERGENCY MEDICINE

## 2025-03-14 PROCEDURE — 93010 ELECTROCARDIOGRAM REPORT: CPT | Mod: ,,, | Performed by: INTERNAL MEDICINE

## 2025-03-14 PROCEDURE — 85025 COMPLETE CBC W/AUTO DIFF WBC: CPT | Performed by: EMERGENCY MEDICINE

## 2025-03-14 PROCEDURE — 84443 ASSAY THYROID STIM HORMONE: CPT | Performed by: EMERGENCY MEDICINE

## 2025-03-14 PROCEDURE — 25000003 PHARM REV CODE 250: Performed by: INTERNAL MEDICINE

## 2025-03-14 PROCEDURE — 82803 BLOOD GASES ANY COMBINATION: CPT

## 2025-03-14 PROCEDURE — 93005 ELECTROCARDIOGRAM TRACING: CPT

## 2025-03-14 PROCEDURE — 21400001 HC TELEMETRY ROOM

## 2025-03-14 PROCEDURE — 80061 LIPID PANEL: CPT | Performed by: EMERGENCY MEDICINE

## 2025-03-14 PROCEDURE — 27000221 HC OXYGEN, UP TO 24 HOURS

## 2025-03-14 PROCEDURE — 85610 PROTHROMBIN TIME: CPT | Performed by: EMERGENCY MEDICINE

## 2025-03-14 PROCEDURE — 27000207 HC ISOLATION

## 2025-03-14 PROCEDURE — 25000242 PHARM REV CODE 250 ALT 637 W/ HCPCS: Performed by: INTERNAL MEDICINE

## 2025-03-14 RX ORDER — CETIRIZINE HYDROCHLORIDE 10 MG/1
10 TABLET ORAL DAILY
Status: DISCONTINUED | OUTPATIENT
Start: 2025-03-15 | End: 2025-03-18 | Stop reason: HOSPADM

## 2025-03-14 RX ORDER — TRAMADOL HYDROCHLORIDE 50 MG/1
50 TABLET ORAL EVERY 6 HOURS PRN
Status: DISCONTINUED | OUTPATIENT
Start: 2025-03-14 | End: 2025-03-18 | Stop reason: HOSPADM

## 2025-03-14 RX ORDER — SODIUM CHLORIDE 0.9 % (FLUSH) 0.9 %
10 SYRINGE (ML) INJECTION
Status: DISCONTINUED | OUTPATIENT
Start: 2025-03-14 | End: 2025-03-18 | Stop reason: HOSPADM

## 2025-03-14 RX ORDER — ASPIRIN 81 MG/1
81 TABLET ORAL DAILY
Status: DISCONTINUED | OUTPATIENT
Start: 2025-03-15 | End: 2025-03-18 | Stop reason: HOSPADM

## 2025-03-14 RX ORDER — MUPIROCIN 20 MG/G
OINTMENT TOPICAL 2 TIMES DAILY
Status: DISCONTINUED | OUTPATIENT
Start: 2025-03-14 | End: 2025-03-18 | Stop reason: HOSPADM

## 2025-03-14 RX ORDER — ONDANSETRON HYDROCHLORIDE 2 MG/ML
4 INJECTION, SOLUTION INTRAVENOUS EVERY 8 HOURS PRN
Status: DISCONTINUED | OUTPATIENT
Start: 2025-03-14 | End: 2025-03-18 | Stop reason: HOSPADM

## 2025-03-14 RX ORDER — FLUTICASONE PROPIONATE 50 MCG
1 SPRAY, SUSPENSION (ML) NASAL 2 TIMES DAILY
Status: DISCONTINUED | OUTPATIENT
Start: 2025-03-14 | End: 2025-03-18 | Stop reason: HOSPADM

## 2025-03-14 RX ORDER — GABAPENTIN 300 MG/1
300 CAPSULE ORAL 3 TIMES DAILY
Status: DISCONTINUED | OUTPATIENT
Start: 2025-03-15 | End: 2025-03-18 | Stop reason: HOSPADM

## 2025-03-14 RX ORDER — FLUTICASONE PROPIONATE AND SALMETEROL XINAFOATE 45; 21 UG/1; UG/1
2 AEROSOL, METERED RESPIRATORY (INHALATION) EVERY 12 HOURS
Status: DISCONTINUED | OUTPATIENT
Start: 2025-03-14 | End: 2025-03-14

## 2025-03-14 RX ORDER — FLUTICASONE FUROATE AND VILANTEROL 100; 25 UG/1; UG/1
1 POWDER RESPIRATORY (INHALATION) DAILY
Status: DISCONTINUED | OUTPATIENT
Start: 2025-03-14 | End: 2025-03-18 | Stop reason: HOSPADM

## 2025-03-14 RX ORDER — LISINOPRIL 20 MG/1
20 TABLET ORAL DAILY
Status: DISCONTINUED | OUTPATIENT
Start: 2025-03-15 | End: 2025-03-18 | Stop reason: HOSPADM

## 2025-03-14 RX ORDER — ACETAMINOPHEN 500 MG
500 TABLET ORAL NIGHTLY PRN
Status: DISCONTINUED | OUTPATIENT
Start: 2025-03-14 | End: 2025-03-18 | Stop reason: HOSPADM

## 2025-03-14 RX ORDER — POTASSIUM CHLORIDE 20 MEQ/1
20 TABLET, EXTENDED RELEASE ORAL DAILY
Status: DISCONTINUED | OUTPATIENT
Start: 2025-03-15 | End: 2025-03-18 | Stop reason: HOSPADM

## 2025-03-14 RX ORDER — TALC
6 POWDER (GRAM) TOPICAL NIGHTLY PRN
Status: DISCONTINUED | OUTPATIENT
Start: 2025-03-14 | End: 2025-03-18 | Stop reason: HOSPADM

## 2025-03-14 RX ORDER — FUROSEMIDE 20 MG/1
20 TABLET ORAL DAILY
Status: DISCONTINUED | OUTPATIENT
Start: 2025-03-15 | End: 2025-03-18 | Stop reason: HOSPADM

## 2025-03-14 RX ORDER — HYDROCHLOROTHIAZIDE 25 MG/1
25 TABLET ORAL DAILY
Status: DISCONTINUED | OUTPATIENT
Start: 2025-03-15 | End: 2025-03-18 | Stop reason: HOSPADM

## 2025-03-14 RX ORDER — KETOROLAC TROMETHAMINE 30 MG/ML
30 INJECTION, SOLUTION INTRAMUSCULAR; INTRAVENOUS
Status: DISCONTINUED | OUTPATIENT
Start: 2025-03-14 | End: 2025-03-14

## 2025-03-14 RX ORDER — LISINOPRIL AND HYDROCHLOROTHIAZIDE 20; 25 MG/1; MG/1
1 TABLET ORAL DAILY
Status: DISCONTINUED | OUTPATIENT
Start: 2025-03-15 | End: 2025-03-14

## 2025-03-14 RX ORDER — PANTOPRAZOLE SODIUM 40 MG/1
40 TABLET, DELAYED RELEASE ORAL DAILY
Status: DISCONTINUED | OUTPATIENT
Start: 2025-03-15 | End: 2025-03-18 | Stop reason: HOSPADM

## 2025-03-14 RX ORDER — BUDESONIDE 3 MG/1
3 CAPSULE, COATED PELLETS ORAL 2 TIMES DAILY
Status: DISCONTINUED | OUTPATIENT
Start: 2025-03-14 | End: 2025-03-18 | Stop reason: HOSPADM

## 2025-03-14 RX ORDER — ALBUTEROL SULFATE 90 UG/1
2 INHALANT RESPIRATORY (INHALATION) EVERY 4 HOURS PRN
Status: DISCONTINUED | OUTPATIENT
Start: 2025-03-14 | End: 2025-03-18 | Stop reason: HOSPADM

## 2025-03-14 RX ORDER — POLYVINYL ALCOHOL 14 MG/ML
2 SOLUTION/ DROPS OPHTHALMIC
COMMUNITY

## 2025-03-14 RX ORDER — SODIUM CHLORIDE, SODIUM LACTATE, POTASSIUM CHLORIDE, CALCIUM CHLORIDE 600; 310; 30; 20 MG/100ML; MG/100ML; MG/100ML; MG/100ML
INJECTION, SOLUTION INTRAVENOUS CONTINUOUS
Status: DISCONTINUED | OUTPATIENT
Start: 2025-03-14 | End: 2025-03-17

## 2025-03-14 RX ORDER — ACETAMINOPHEN 500 MG
500 TABLET ORAL EVERY 6 HOURS PRN
Status: COMPLETED | OUTPATIENT
Start: 2025-03-14 | End: 2025-03-16

## 2025-03-14 RX ORDER — DIPHENHYDRAMINE HCL 25 MG
25 CAPSULE ORAL NIGHTLY PRN
Status: DISCONTINUED | OUTPATIENT
Start: 2025-03-14 | End: 2025-03-18 | Stop reason: HOSPADM

## 2025-03-14 RX ORDER — ZOLPIDEM TARTRATE 5 MG/1
10 TABLET ORAL NIGHTLY PRN
Status: DISCONTINUED | OUTPATIENT
Start: 2025-03-14 | End: 2025-03-18 | Stop reason: HOSPADM

## 2025-03-14 RX ADMIN — IOHEXOL 75 ML: 350 INJECTION, SOLUTION INTRAVENOUS at 10:03

## 2025-03-14 RX ADMIN — MUPIROCIN: 20 OINTMENT TOPICAL at 08:03

## 2025-03-14 RX ADMIN — FLUTICASONE PROPIONATE 50 MCG: 50 SPRAY, METERED NASAL at 11:03

## 2025-03-14 RX ADMIN — SODIUM CHLORIDE, POTASSIUM CHLORIDE, SODIUM LACTATE AND CALCIUM CHLORIDE: 600; 310; 30; 20 INJECTION, SOLUTION INTRAVENOUS at 08:03

## 2025-03-14 RX ADMIN — HYPROMELLOSE 2910 2 DROP: 5 SOLUTION/ DROPS OPHTHALMIC at 10:03

## 2025-03-14 RX ADMIN — ZOLPIDEM TARTRATE 10 MG: 5 TABLET, FILM COATED ORAL at 11:03

## 2025-03-14 RX ADMIN — FLUTICASONE FUROATE AND VILANTEROL TRIFENATATE 1 PUFF: 100; 25 POWDER RESPIRATORY (INHALATION) at 11:03

## 2025-03-14 RX ADMIN — TRAMADOL HYDROCHLORIDE 50 MG: 50 TABLET, COATED ORAL at 05:03

## 2025-03-14 RX ADMIN — ACETAMINOPHEN 500 MG: 500 TABLET ORAL at 02:03

## 2025-03-14 RX ADMIN — BUDESONIDE 3 MG: 3 CAPSULE, COATED PELLETS ORAL at 11:03

## 2025-03-14 RX ADMIN — POLYVINYL ALCOHOL, POVIDONE 2 DROP: 14; 6 SOLUTION/ DROPS OPHTHALMIC at 11:03

## 2025-03-14 RX ADMIN — ACETAMINOPHEN 500 MG: 500 TABLET ORAL at 08:03

## 2025-03-14 NOTE — CONSULTS
[facility] - Emergency Dept  Neurology  Consult Note      Vic Meng is a 64 y.o. male who presented to [facility] on 3/14/2025 with reports of slurred speech, generalized weakness . Onset of symptoms was sudden, not related to any specific activity and last known normal 17:00 on 3/13 . Stroke risk factors include diabetes mellitus and hypertension. Prior stroke history: none.     He was discharged from ED on 3/13 after he was hospitalized for COVID.  When the [employer] dropped him off at his house yesterday afternoon at 17:00, he had slurred speech per family report.  He also reports generalized weakness.       Past Medical History:   Diagnosis Date    ADELA positive     Antisynthetase syndrome     Diabetes mellitus     GERD (gastroesophageal reflux disease)     Herpes zoster     Hypertension     ILD (interstitial lung disease)     Lymphadenopathy     Morbid obesity     Osteoarthritis of right knee     Rheumatoid arthritis     Snoring     Supraclavicular mass     Thyroid nodule     Venous insufficiency of lower extremity      Past Surgical History:   Procedure Laterality Date    Drainage of right thyroid gland lobe  10/07/2021    Laser surgery left leg Left     Right hand surgery       Family History   Problem Relation Name Age of Onset    Heart disease Mother      Breast cancer Mother      Heart attack Mother       Social History[1]   Review of patient's allergies indicates:  No Known Allergies      STROKE DOCUMENTATION   Acute Stroke Times   Last Known Normal Date: 03/13/25  Last Known Normal Time: 1700  Symptom Onset Date: 03/13/25  Symptom Onset Time: 1700  Stroke Team Called Date: 03/14/25  Stroke Team Called Time: 1014  Stroke Team Arrival Date: 03/14/25  Stroke Team Arrival Time: 1017  Thrombolytic Therapy Recommended: No (out of the window)  Thrombectomy Recommended: No    NIH Scale:  1a. Level of Consciousness: 0-->Alert, keenly responsive  1b. LOC Questions: 0-->Answers both questions correctly  1c.  LOC Commands: 0-->Performs both tasks correctly  2. Best Gaze: 0-->Normal  3. Visual: 0-->No visual loss  4. Facial Palsy: 0-->Normal symmetrical movements  5a. Motor Arm, Left: 0-->No drift, limb holds 90 (or 45) degrees for full 10 secs  5b. Motor Arm, Right: 0-->No drift, limb holds 90 (or 45) degrees for full 10 secs  6a. Motor Leg, Left: 3-->No effort against gravity, leg falls to bed immediately  6b. Motor Leg, Right: 3-->No effort against gravity, leg falls to bed immediately  7. Limb Ataxia: 0-->Absent  8. Sensory: 0-->Normal, no sensory loss  9. Best Language: 0-->No aphasia, normal  10. Dysarthria: 0-->Normal  11. Extinction and Inattention (formerly Neglect): 0-->No abnormality  Total (NIH Stroke Scale): 6     Modified Hardee Score: 2        Neurological Exam:   LOC: alert and follows requests  Language: No aphasia  Speech: No dysarthria  Memory: Recent memory intact, Remote memory intact, Age correct, Month correct  Visual Fields (CN II): Full  EOM (CN III, IV, VI): Full/intact  Pupils (CN III, IV, VI): PERRL  Facial Sensation (CN V): Symmetric, Corneal reflex intact  Hearing (CN VIII): intact bilaterally  Motor*: Arm Left:  Paretic:  4/5, Leg Left:   Paretic:  4/5, Arm Right:   Paretic:  4/5, Leg Right:   Paretic:  4/5  Sensation: intact to light touch, temperature and vibration      Laboratory:  BMP:   Lab Results   Component Value Date    GLUCOSE 117 (H) 03/09/2025     03/09/2025    K 3.5 03/09/2025    CL 98 03/09/2025    CO2 26 03/09/2025    BUN 16.4 03/09/2025    CREATININE 0.92 03/09/2025    CALCIUM 8.5 (L) 03/09/2025     CBC:   Lab Results   Component Value Date    WBC 7.00 03/09/2025    WBC 7 09/28/2022    RBC 4.16 (L) 03/09/2025    HGB 11.2 (L) 03/09/2025    HCT 33.6 (L) 03/09/2025    HCT 37 01/18/2025     03/09/2025    MCV 80.8 03/09/2025    MCH 26.9 (L) 03/09/2025    MCHC 33.3 03/09/2025     Lipid Panel:   Lab Results   Component Value Date    CHOL 213 (H) 03/12/2021    HDL 45  03/12/2021    TRIG 166 (H) 03/12/2021     Coagulation:   Lab Results   Component Value Date    INR 1.1 12/19/2024    APTT 36.9 (H) 09/12/2022     Hgb A1C:   Lab Results   Component Value Date    HGBA1C 5.2 02/23/2022     TSH:   Lab Results   Component Value Date    TSH 0.802 01/18/2025       Diagnostic Results  Brain Imaging:   -CTh: negative for hemorrhage  Cerebrovascular Imaging:   -CTA h/n: completed, awaiting interpretation        Discussed with neurologist on call:   No recommendations for TNK 2/2 out of the window, no focal/lateralizing deficits, not likely LVO  Thrombolysis Candidate? No, Out of window - Symptom onset > 4.5 hours  -Delays to Thrombolysis?  Not Applicable        PLAN:    -CT head is negative for hemorrhage  -CTA head and neck not likely LVO  -EMS reported he was hypoxic on scene which could have contributed to his dysarthria, for me on exam, he is non focal and his speech is pretty easily understandable, I do not appreciate at dysarthria   -no further testing from a neurology standpoint at this time based on current exam  -will discuss further differentials and testing with MD during rounds    Further recommendations to follow from MD Giselle Marsh, NP  Neurology  [facility] - Emergency Dept           [1]   Social History  Tobacco Use    Smoking status: Every Day     Current packs/day: 0.25     Types: Cigarettes    Smokeless tobacco: Never    Tobacco comments:     Only about 2 cigarettes a day    Substance Use Topics    Alcohol use: Yes     Comment: One beer a month    Drug use: Yes     Types: Marijuana     Comment: Occasionally

## 2025-03-14 NOTE — NURSING
Artificial Intelligence Notification  [facility]  [address] [address]  Phone: [phone]     This documentation was triggered by an Artificial Intelligence Notification.     Admit Date: 3/14/2025   LOS: 0  Code Status: Full Code  : 1960  Age: 64 y.o.  Weight:   Wt Readings from Last 1 Encounters:   25 108.9 kg (240 lb)        Sex: male  Bed: 6/[facility] A  MRN: [id number]  Attending Physician: Sarabjit Arreaga MD     Date of Alert: 2025  Time AI Alert Received:              Vitals:    25 1828   BP: 127/84   Pulse: (!) 113   Resp: 20   Temp: 98.6 °F (37 °C)       Artificial Intelligence alert discussed with Provider:     Name: Primary RN   Date/Time of Provider Notification: 3/14/25 @ 1840      Patient Condition: AI flagged for above. Patient recently discharged from [facility] after being treated for COVID. Arrived to ED today with worsening slurred speech and LSW. Scans negative, neuro following. Patient just admitted. No need for ICU at this time. Care being initiated and ongoing by primary team. Notify with any acute decompensation.

## 2025-03-14 NOTE — ED PROVIDER NOTES
Encounter Date: 3/14/2025    SCRIBE #1 NOTE: I, Hellen Foy, am scribing for, and in the presence of,  Rafi Shelley III, MD. I have scribed the following portions of the note - the EKG reading. Other sections scribed: HPI, ROS, PE.       History     Chief Complaint   Patient presents with    Slurred Speech     Arrives via [facility] with slurred speech onset yesterday 5 PM. Discharged yesterday after being admitted for COVID. Patient with slurred speech and expressive aphasia on arrival.     Patient is a 65 y/o male with a PMHx of ADELA positive, DM, GERD, HTN, ILD, rheumatoid arthritis, supraclavicular mass, and venous insufficency presents to the ED via EMS as an LVO protocol activation following slurred speech beginning at 17:30 last night. EMS reports patient was diagnosed with covid recently, was discharged from the hospital yesterday. EMS reports patient's family noticed patient's speech was slurred and is progressively worse this morning. On EMS arrival, patient attempted to stand and his oxygen saturation dropped in the 70s. EMS reports administering SpO2 4L NC en route to the ED. EMS reports patient is currently 97% on room air. EMS reports patient's left hand is weaker than his right. Patient reports bilateral arm pain. He reports he is right handed.    The history is provided by the patient, the EMS personnel and medical records. No  was used.     Review of patient's allergies indicates:  No Known Allergies  Past Medical History:   Diagnosis Date    ADELA positive     Antisynthetase syndrome     Diabetes mellitus     GERD (gastroesophageal reflux disease)     Herpes zoster     Hypertension     ILD (interstitial lung disease)     Lymphadenopathy     Morbid obesity     Osteoarthritis of right knee     Rheumatoid arthritis     Snoring     Supraclavicular mass     Thyroid nodule     Venous insufficiency of lower extremity      Past Surgical History:   Procedure Laterality Date    Drainage of  right thyroid gland lobe  10/07/2021    Laser surgery left leg Left     Right hand surgery       Family History   Problem Relation Name Age of Onset    Heart disease Mother      Breast cancer Mother      Heart attack Mother       Social History[1]  Review of Systems   Constitutional:  Negative for chills, fatigue and fever.   HENT:  Negative for congestion and sore throat.    Eyes:  Negative for visual disturbance.   Respiratory:  Negative for cough and shortness of breath.    Cardiovascular:  Negative for chest pain.   Gastrointestinal:  Negative for abdominal pain, diarrhea, nausea and vomiting.   Genitourinary:  Negative for dysuria.   Musculoskeletal:  Negative for myalgias.        Positive for bilateral arm pain.   Skin:  Negative for rash.   Neurological:  Positive for speech difficulty and weakness (left arm weaker than right).   All other systems reviewed and are negative.      Physical Exam     Initial Vitals [03/14/25 1015]   BP Pulse Resp Temp SpO2   (!) 140/92 (!) 124 (!) 24 99.7 °F (37.6 °C) 97 %      MAP       --         Physical Exam    Nursing note and vitals reviewed.  Constitutional: He appears well-developed and well-nourished.   HENT:   Head: Normocephalic and atraumatic.   Eyes: EOM are normal. Pupils are equal, round, and reactive to light.   Neck:   Normal range of motion.  Cardiovascular:  Normal rate, regular rhythm, normal heart sounds and intact distal pulses.           Pulmonary/Chest: Breath sounds normal.   Abdominal: Abdomen is soft. Bowel sounds are normal.   Musculoskeletal:         General: Normal range of motion.      Cervical back: Normal range of motion.      Comments: 3+ peripheral edema.     Neurological: He is alert.   Globally weak.  Garbled/slurred speech.   Skin: Skin is warm and dry. Capillary refill takes less than 2 seconds.   Psychiatric: He has a normal mood and affect. Judgment normal.         ED Course   Critical Care    Date/Time: 3/14/2025 10:35 AM    Performed by:  Rafi Shelley III, MD  Authorized by: Rafi Shelley III, MD  Direct patient critical care time: 15 minutes  Additional history critical care time: 4 minutes  Ordering / reviewing critical care time: 5 minutes  Documentation critical care time: 4 minutes  Consulting other physicians critical care time: 4 minutes  Consult with family critical care time: 3 minutes  Other critical care time: 2 minutes  Total critical care time (exclusive of procedural time) : 37 minutes  Critical care time was exclusive of separately billable procedures and treating other patients and teaching time.  Critical care was necessary to treat or prevent imminent or life-threatening deterioration of the following conditions: circulatory failure and CNS failure or compromise.  Critical care was time spent personally by me on the following activities: development of treatment plan with patient or surrogate, discussions with consultants, discussions with primary provider, interpretation of cardiac output measurements, evaluation of patient's response to treatment, examination of patient, obtaining history from patient or surrogate, ordering and performing treatments and interventions, ordering and review of laboratory studies, ordering and review of radiographic studies, pulse oximetry, re-evaluation of patient's condition and review of old charts.        Labs Reviewed   COMPREHENSIVE METABOLIC PANEL - Abnormal       Result Value    Sodium 137      Potassium 4.2      Chloride 99      CO2 29      Glucose 113      Blood Urea Nitrogen 24.3      Creatinine 0.96      Calcium 8.0 (*)     Protein Total 6.5      Albumin 2.4 (*)     Globulin 4.1 (*)     Albumin/Globulin Ratio 0.6 (*)     Bilirubin Total 0.5      ALP 90      ALT 13      AST 13      eGFR >60      Anion Gap 9.0      BUN/Creatinine Ratio 25     PROTIME-INR - Abnormal    PT 15.6 (*)     INR 1.3      Narrative:     Protimes are used to monitor anticoagulant agents such as warfarin. PT INR  values are based on the current patient normal mean and the MARI value for the specific instrument reagent used.  **Routine theraputic target values for the INR are 2.0-3.0**   LIPID PANEL - Abnormal    Cholesterol Total 108      HDL Cholesterol 32 (*)     Triglyceride 49      Cholesterol/HDL Ratio 3      Very Low Density Lipoprotein 10      LDL Cholesterol 66.00     CBC WITH DIFFERENTIAL - Abnormal    WBC 10.18      RBC 4.07 (*)     Hgb 10.8 (*)     Hct 33.4 (*)     MCV 82.1      MCH 26.5 (*)     MCHC 32.3 (*)     RDW 15.4      Platelet 381      MPV 10.2      Neut % 70.2      Lymph % 14.4      Mono % 11.8      Eos % 2.4      Basophil % 0.4      Imm Grans % 0.8      Neut # 7.15      Lymph # 1.47      Mono # 1.20      Eos # 0.24      Baso # 0.04      Imm Gran # 0.08 (*)     NRBC% 0.0     BLOOD GAS - Abnormal    Sample Type Arterial Blood      Sample site Right Radial Artery      Drawn by kdt rt      pH, Blood gas 7.460 (*)     pCO2, Blood gas 47.0 (*)     pO2, Blood gas 80.0      Sodium, Blood Gas 135 (*)     Potassium, Blood Gas 4.0      Calcium Level Ionized 1.12      TOC2, Blood gas 34.8      Base Excess, Blood gas 8.50 (*)     sO2, Blood gas 96.4      HCO3, Blood gas 33.4 (*)     THb, Blood gas 11.1 (*)     O2 Hb, Blood Gas 94.6      CO Hgb 1.4      Met Hgb 0.6      Allens Test Yes      Oxygen Device, Blood gas Cannula     TSH - Normal    TSH 0.790     CBC W/ AUTO DIFFERENTIAL    Narrative:     The following orders were created for panel order CBC W/ AUTO DIFFERENTIAL.  Procedure                               Abnormality         Status                     ---------                               -----------         ------                     CBC with Differential[[id number]]       Abnormal            Final result                 Please view results for these tests on the individual orders.   POCT GLUCOSE, HAND-HELD DEVICE     EKG Readings: (Independently Interpreted)   Initial Reading: No STEMI. Previous EKG Date:  3/9/25. Rhythm: Sinus Tachycardia. Heart Rate: 113. Ectopy: No Ectopy. Conduction: Normal. ST Segments: Normal ST Segments. T Waves: Normal. Clinical Impression: Sinus Tachycardia and Left Ventricular Hypertrophy (LVH)   Done at 10:52 on 3/14/25     ECG Results              ECG 12 lead (Final result)        Collection Time Result Time QRS Duration OHS QTC Calculation    03/14/25 10:52:26 03/14/25 13:01:31 134 477                     Final result by Interface, Lab In [facility] (03/14/25 13:01:38)                   Narrative:    Test Reason : R29.818,    Vent. Rate : 113 BPM     Atrial Rate : 113 BPM     P-R Int : 184 ms          QRS Dur : 134 ms      QT Int : 348 ms       P-R-T Axes :  49 -11  84 degrees    QTcB Int : 477 ms    Sinus tachycardia  Nonspecific intraventricular block  Minimal voltage criteria for LVH, may be normal variant ( [place] product )  Abnormal ECG  When compared with ECG of 09-Mar-2025 06:39,  No significant change was found  Confirmed by Toño Albert ([id number]) on 3/14/2025 1:01:30 PM    Referred By: XI PETERSON           Confirmed By: Toño Albert                                  Imaging Results              X-Ray Chest 1 View (Final result)  Result time 03/14/25 11:53:00      Final result by Rito Kim MD (03/14/25 11:53:00)                   Impression:      Chronic interstitial lung changes.    Otherwise no active pulmonary disease      Electronically signed by: Rito Kim  Date:    03/14/2025  Time:    11:53               Narrative:    EXAMINATION:  XR CHEST 1 VIEW    CPT 83161    CLINICAL HISTORY:  weakness;    COMPARISON:  March 9, 2025    FINDINGS:  Examination reveals cardiomediastinal silhouette and pleuroparenchymal changes to be essentially unchanged as compared with the previous exam.    Persistent increase interstitial markings chronic in nature in seen on previous exams    No new focal consolidative changes                                       CTA Head and Neck (xpd)  (Final result)  Result time 03/14/25 12:14:17      Final result by Joy Dailey MD (03/14/25 12:14:17)                   Impression:      No large vessel occlusion or flow-limiting stenosis.      Electronically signed by: Joy Dailey  Date:    03/14/2025  Time:    12:14               Narrative:    EXAMINATION:  CTA HEAD AND NECK (XPD)    CLINICAL HISTORY:  Stroke/TIA, determine embolic source;    TECHNIQUE:  Axial images obtained through the cervical region and [place] before and after the administration of intravenous contrast.    Coronal, sagittal, MIP and 3D reconstructions were obtained from the axial data.    Automatic exposure control was utilized to limit radiation dose.    Radiation Dose:    Total DLP: 2213 mGy*cm    COMPARISON:  CT head dated 03/14/2025, CT chest dated 01/19/2025    FINDINGS:  Head CT with contrast:    No interval changes when compared to the previous CT.    No enhancing abnormalities.    If present, stenosis of the carotid bulbs is measured based on NASCET criteria,    i.e. area of maximal stenosis compared to the cervical ICA distal to the bulb.    Cervical CTA:    The origins of the great vessels are patent.    The common carotid is patent.  There are mild calcifications carotid bulbs without hemodynamically significant stenosis.  The internal carotid arteries are patent.    The vertebral arteries are patent.    Intracranial CTA:    The internal carotid arteries, middle cerebral arteries and anterior cerebral arteries are patent.    The vertebral arteries, basilar artery and posterior cerebral arteries are patent    The dural venous sinuses are patent.    Additional findings:    1. Chronic interstitial changes in the lungs.  2. 1.8 cm left thyroid nodule.                                       CTA Head and Neck for Stroke (XPD) (In process)                      CT HEAD FOR CODE STROKE (Final result)  Result time 03/14/25 10:55:17      Final result by Darren Shah  MD (03/14/25 10:55:17)                   Impression:      1.  No acute intracranial findings identified.    2.  Marked chronic microangiopathic ischemia and atrophy.    Findings notified Dr. Shelley March 14, 2025 at 10:52 hours      Electronically signed by: Darren Shah  Date:    03/14/2025  Time:    10:55               Narrative:    EXAMINATION:  CT HEAD FOR CODE STROKE    CLINICAL HISTORY:  Neuro deficit, acute, stroke suspected;    TECHNIQUE:  Sequential axial images were performed of the brain without contrast.    Dose product length of 1105 mGycm. Automated exposure control was utilized to minimize radiation dose.    COMPARISON:  None available.    FINDINGS:  There is no intracranial mass effect, midline shift, hydrocephalus or hemorrhage. There is no sulcal effacement or low attenuation changes to suggest recent large vessel territory infarction. Chronic appearing periventricular and subcortical white matter low attenuation changes are consistent with marked chronic microangiopathic ischemia. The ventricular system and sulcal markings prominence is consistent with atrophy. There is no acute extra axial fluid collection.    There is mucoperiosteal thickening of the sphenoid sinus to the left of septum.  Otherwise, visualized paranasal sinuses are clear without mucosal thickening, polypoidal abnormality or air-fluid levels. Mastoid air cells aeration is optimal.                                       Medications   iohexoL (OMNIPAQUE 350) injection 75 mL (75 mLs Intravenous Given 3/14/25 1031)     Medical Decision Making  The differential diagnosis includes, but is not limited to, CVA, TIA, decompensation, covid, hypoxia, and global weakness.    Activated as a code fast secondary to a garbled speech  but weakness seems global non non lateralizing symptoms patient seen by stroke Neurology no need for acute intervention global weakness patient ABG without significant abnormality but patient is tachypneic and  mildly tachycardic re-evaluated again no lateralizing symptoms patient will be admitted for deconditioning discussed case with patient's    Problems Addressed:  Acute focal neurological deficit: complicated acute illness or injury that poses a threat to life or bodily functions  Physical deconditioning: complicated acute illness or injury that poses a threat to life or bodily functions    Amount and/or Complexity of Data Reviewed  Independent Historian: EMS     Details: EMS reports patient was diagnosed with covid recently, was discharged from the hospital yesterday. EMS reports patient's family noticed patient's speech was slurred and is progressively worse this morning. On EMS arrival, patient attempted to stand and his oxygen saturation dropped in the 70s. EMS reports administering SpO2 4L NC en route to the ED. EMS reports patient is currently 97% on room air. EMS reports patient's left hand is weaker than his right.  Labs: ordered.  Radiology: ordered.  ECG/medicine tests: ordered and independent interpretation performed.     Details: No STEMI. Previous EKG Date: 3/9/25. Rhythm: Sinus Tachycardia. Heart Rate: 113. Ectopy: No Ectopy. Conduction: Normal. ST Segments: Normal ST Segments. T Waves: Normal. Clinical Impression: Sinus Tachycardia and Left Ventricular Hypertrophy (LVH)   Done at 10:52 on 3/14/25     Risk  Prescription drug management.  Decision regarding hospitalization.            Scribe Attestation:   Scribe #1: I performed the above scribed service and the documentation accurately describes the services I performed. I attest to the accuracy of the note.    Attending Attestation:           Physician Attestation for Scribe:  Physician Attestation Statement for Scribe #1: I, Rafi Shelley III, MD, reviewed documentation, as scribed by Hellen Foy in my presence, and it is both accurate and complete.             ED Course as of 03/14/25 1301   Fri Mar 14, 2025   1231 Paged Dr. Sarabjit Arreaga [MB]    [phone] Discussed with Dr. Sarabjit Arreaga [MB]      ED Course User Index  [MB] Hellen Foy                           Clinical Impression:  Final diagnoses:  [R29.818] Acute focal neurological deficit  [U07.1] COVID-19 (Primary)  [R53.81] Physical deconditioning          ED Disposition Condition    Admit Stable                    [1]   Social History  Tobacco Use    Smoking status: Every Day     Current packs/day: 0.25     Types: Cigarettes    Smokeless tobacco: Never    Tobacco comments:     Only about 2 cigarettes a day    Substance Use Topics    Alcohol use: Yes     Comment: One beer a month    Drug use: Yes     Types: Marijuana     Comment: Occasionally        Rafi Shelley III, MD  03/14/25 [id number]

## 2025-03-14 NOTE — DISCHARGE SUMMARY
[facility]                       [address] [id number]    PATIENT NAME:       VIC NUNEZ   YOB: 1960  CSN:                [id number]   MRN:                [id number]  ADMIT DATE:         03/09/2025 06:48:00  PHYSICIAN:          Sarabjit Arreaga MD                          DISCHARGE SUMMARY    DATE OF DISCHARGE:      *** history of gastroesophageal reflux, osteoarthritis.    HOSPITAL COURSE:  Vic is a 64-year-old  male who has been   experiencing symptoms of shortness of breath and generalized malaise, body ache,   and sore throat for few days, but eventually after interviewing the patient, he   stated that his daughter was diagnosed to have COVID.  Therefore, he decided to   come to the hospital and he was also diagnosed of COVID-19 positive.  The   patient was started on remdesivir.  He is O2 dependent at home.  We started him   on oxygen and eventually the patient progressively has been getting better and   he definitely completed his complete course of remdesivir.  At this present   time, the patient is fairly stable.  The  is working for him to have   a wheelchair and also to use oxygen back at home.        ______________________________  Sarabjit Arreaga MD    CHL/AQS  DD:  03/13/2025  Time:  01:37PM  DT:  03/13/2025  Time:  02:38PM  Job #:  [id number]      DISCHARGE SUMMARY

## 2025-03-15 PROCEDURE — 25000242 PHARM REV CODE 250 ALT 637 W/ HCPCS: Performed by: INTERNAL MEDICINE

## 2025-03-15 PROCEDURE — 27000207 HC ISOLATION

## 2025-03-15 PROCEDURE — 25000003 PHARM REV CODE 250: Performed by: STUDENT IN AN ORGANIZED HEALTH CARE EDUCATION/TRAINING PROGRAM

## 2025-03-15 PROCEDURE — 21400001 HC TELEMETRY ROOM

## 2025-03-15 PROCEDURE — 63600175 PHARM REV CODE 636 W HCPCS: Performed by: EMERGENCY MEDICINE

## 2025-03-15 PROCEDURE — 25000003 PHARM REV CODE 250: Performed by: INTERNAL MEDICINE

## 2025-03-15 RX ADMIN — POTASSIUM CHLORIDE 20 MEQ: 1500 TABLET, EXTENDED RELEASE ORAL at 08:03

## 2025-03-15 RX ADMIN — LISINOPRIL 20 MG: 20 TABLET ORAL at 08:03

## 2025-03-15 RX ADMIN — POLYVINYL ALCOHOL, POVIDONE 2 DROP: 14; 6 SOLUTION/ DROPS OPHTHALMIC at 05:03

## 2025-03-15 RX ADMIN — CETIRIZINE HYDROCHLORIDE 10 MG: 10 TABLET, FILM COATED ORAL at 08:03

## 2025-03-15 RX ADMIN — POLYVINYL ALCOHOL, POVIDONE 2 DROP: 14; 6 SOLUTION/ DROPS OPHTHALMIC at 11:03

## 2025-03-15 RX ADMIN — BUDESONIDE 3 MG: 3 CAPSULE, COATED PELLETS ORAL at 08:03

## 2025-03-15 RX ADMIN — MUPIROCIN: 20 OINTMENT TOPICAL at 08:03

## 2025-03-15 RX ADMIN — TRAMADOL HYDROCHLORIDE 50 MG: 50 TABLET, COATED ORAL at 05:03

## 2025-03-15 RX ADMIN — GABAPENTIN 300 MG: 300 CAPSULE ORAL at 08:03

## 2025-03-15 RX ADMIN — HYPROMELLOSE 2910 2 DROP: 5 SOLUTION/ DROPS OPHTHALMIC at 05:03

## 2025-03-15 RX ADMIN — HYPROMELLOSE 2910 2 DROP: 5 SOLUTION/ DROPS OPHTHALMIC at 07:03

## 2025-03-15 RX ADMIN — ACETAMINOPHEN 500 MG: 500 TABLET ORAL at 08:03

## 2025-03-15 RX ADMIN — HYPROMELLOSE 2910 2 DROP: 5 SOLUTION/ DROPS OPHTHALMIC at 11:03

## 2025-03-15 RX ADMIN — TRAMADOL HYDROCHLORIDE 50 MG: 50 TABLET, COATED ORAL at 10:03

## 2025-03-15 RX ADMIN — FLUTICASONE PROPIONATE 50 MCG: 50 SPRAY, METERED NASAL at 08:03

## 2025-03-15 RX ADMIN — LACTULOSE 20 G: 10 SOLUTION ORAL at 08:03

## 2025-03-15 RX ADMIN — FUROSEMIDE 20 MG: 20 TABLET ORAL at 05:03

## 2025-03-15 RX ADMIN — GABAPENTIN 300 MG: 300 CAPSULE ORAL at 02:03

## 2025-03-15 RX ADMIN — THERA TABS 1 TABLET: TAB at 08:03

## 2025-03-15 RX ADMIN — HYDROCHLOROTHIAZIDE 25 MG: 25 TABLET ORAL at 08:03

## 2025-03-15 RX ADMIN — HYPROMELLOSE 2910 2 DROP: 5 SOLUTION/ DROPS OPHTHALMIC at 10:03

## 2025-03-15 RX ADMIN — TRAMADOL HYDROCHLORIDE 50 MG: 50 TABLET, COATED ORAL at 03:03

## 2025-03-15 RX ADMIN — SODIUM CHLORIDE, POTASSIUM CHLORIDE, SODIUM LACTATE AND CALCIUM CHLORIDE: 600; 310; 30; 20 INJECTION, SOLUTION INTRAVENOUS at 08:03

## 2025-03-15 RX ADMIN — ZOLPIDEM TARTRATE 10 MG: 5 TABLET, FILM COATED ORAL at 08:03

## 2025-03-15 RX ADMIN — PANTOPRAZOLE SODIUM 40 MG: 40 TABLET, DELAYED RELEASE ORAL at 08:03

## 2025-03-15 RX ADMIN — POLYVINYL ALCOHOL, POVIDONE 2 DROP: 14; 6 SOLUTION/ DROPS OPHTHALMIC at 07:03

## 2025-03-15 RX ADMIN — FLUTICASONE FUROATE AND VILANTEROL TRIFENATATE 1 PUFF: 100; 25 POWDER RESPIRATORY (INHALATION) at 08:03

## 2025-03-15 RX ADMIN — ASPIRIN 81 MG: 81 TABLET, COATED ORAL at 08:03

## 2025-03-15 NOTE — H&P
[facility]                       [address] [address]    PATIENT NAME:       ЮЛИЯ NUNEZ   YOB: 1960  CSN:                [id number]   MRN:                [id number]  ADMIT DATE:         03/14/2025 10:20:00  PHYSICIAN:          Sarabjit Arreaga MD                        HISTORY AND PHYSICAL      CHIEF COMPLAINT:  Shortness of breath, generalized weakness, slurred speech, and   dyspnea.    HISTORY OF PRESENT ILLNESS:  The patient is a 64-year-old  male,   recently discharged from this hospital after he suffered a COVID infection and   received a complete dose of remdesivir.  However, prior to discharge, the   patient has been having problem to have oxygen at home.  He is oxygen dependent..  Apparently, the patient is still having problem with the company, the oxygen was not delivered at home.  However, he came back and   was seen at the emergency room, and preliminary test was done including CT scan   of the head, was definitely negative.  However, due to his current condition,   the ER physician contacted me and thought the patient should be admitted for   further evaluation and treatment.    PAST MEDICAL HISTORY:  Significant for hypertension, diabetes mellitus type 2.    Anemia.  Chronic interstitial lung disease.  Gastroesophageal reflux.    Sinusitis.  Osteoarthritis.  Venous insufficiency of the lower extremity.    Apparently, the patient also had ADELA positive.    SOCIAL HISTORY:  The patient is single, has 2 children, 1 boy and 1 girl.    School completed 11th grade.  Does not drink, but smoke approximately a pack   cigarettes a day.  No history of IV drug abuse.    FAMILY HISTORY:  The patient has a family history of diabetes mellitus, cancer,   hypertension.    PAST SURGICAL HISTORY:  The patient has had varicose vein surgery, left leg and   hand surgery.    ALLERGIES:  NO KNOWN  ALLERGIES.     CURRENT MEDICATIONS:  The patient is on potassium chloride 10 mEq a day.    Lisinopril/HCTZ 20/25 mg once a day.  Zolpidem 10 mg at night.  Omeprazole 40 mg   once a day.  Restasis 0.05% ophthalmic 1 drop in both eyes twice a day.    Gabapentin 300 mg 3 times a day.  Time to time the patient takes tramadol.    REVIEW OF SYSTEMS:  CARDIOVASCULAR:  Negative for chest pain.  RESPIRATORY:  Shortness of breath.  GASTROINTESTINAL:  No diarrhea or vomiting.  ENDOCRINOLOGIC:  Diabetes mellitus.  NEUROLOGICAL:  The patient is complaining of right lower extremity weakness and   slurred speech, which on this present day on examination nothing is noticeable   at this time.    PHYSICAL EXAMINATION:  GENERAL:  The patient is alert and expressing himself very well, complaining   only of body ache and pain, shortness of breath.   Again, no acute distress at   this time and complaining of knee pain.    VITAL SIGNS: At the time I saw the patient, the patient has a blood pressure   159/95, pulse 124, respiration 33, temperature 99.7.    HEENT:  Head is normocephalic with no acute trauma to the head.  Eyes; both eyes   pupils react to light and accommodation is satisfactory.  Ear, nose, and   throat; no current pathology.  NECK:  Supple.  No JVD or adenopathy.    HEART:  The patient has S1, S2.  No murmur or gallop.  CHEST:  Basically in minimal rhonchi.  ABDOMEN:  Soft, nontender.  Good bowel sounds.  EXTREMITIES:  Extremity superior with good range of motion.  Extremity inferior,   right knee pain and trace edema.  There is no clubbing or cyanosis.    NEUROLOGICAL:  I did not find any focal neurological deficit at this present   time.  Cranial nerves 2-12 intact.    LABORATORY DATA:  The patient has a hemoglobin of 10.8, hematocrit 32.4.  BUN   24.3, creatinine 0.96.  Cholesterol 108, LDL 66, HDL 32.    IMPRESSION:    1. Recent COVID infection.  2. Generalized pain.    3. Diabetes mellitus type 2.   4. Hypoxia.    5.  Hypertension.  6. Chronic interstitial lung disease.  7. Osteoarthritis.  8. Gastroesophageal reflux.    9. Venous insufficiency.    10. ADELA positive.    PLAN:  The patient is admitted and we are going to monitor the patient.    Neurologist was consulted.  Appeared to have no sign of stroke at this present   time.  Also, CT of the head was negative for CVA.  We are going to monitor the   patient and also, we will consult  and see if the patient can obtain   oxygen upon discharge.        ______________________________  Sarabjit Arreaga MD    CHL/AQS  DD:  03/14/2025  Time:  10:01PM  DT:  03/15/2025  Time:  12:51AM  Job #:  [id number]      HISTORY AND PHYSICAL

## 2025-03-15 NOTE — PLAN OF CARE
Problem: Adult Inpatient Plan of Care  Goal: Plan of Care Review  Outcome: Progressing  Goal: Patient-Specific Goal (Individualized)  Outcome: Progressing  Goal: Absence of Hospital-Acquired Illness or Injury  Outcome: Progressing  Goal: Optimal Comfort and Wellbeing  Outcome: Progressing  Goal: Readiness for Transition of Care  Outcome: Progressing     Problem: Pneumonia  Goal: Fluid Balance  Outcome: Progressing  Goal: Resolution of Infection Signs and Symptoms  Outcome: Progressing  Goal: Effective Oxygenation and Ventilation  Outcome: Progressing     Problem: Diabetes Comorbidity  Goal: Blood Glucose Level Within Targeted Range  Outcome: Progressing     Problem: Skin Injury Risk Increased  Goal: Skin Health and Integrity  Outcome: Progressing

## 2025-03-16 PROCEDURE — 25000003 PHARM REV CODE 250: Performed by: INTERNAL MEDICINE

## 2025-03-16 PROCEDURE — 21400001 HC TELEMETRY ROOM

## 2025-03-16 PROCEDURE — 63600175 PHARM REV CODE 636 W HCPCS: Performed by: EMERGENCY MEDICINE

## 2025-03-16 PROCEDURE — 25000003 PHARM REV CODE 250: Performed by: STUDENT IN AN ORGANIZED HEALTH CARE EDUCATION/TRAINING PROGRAM

## 2025-03-16 PROCEDURE — 27000207 HC ISOLATION

## 2025-03-16 PROCEDURE — 25000242 PHARM REV CODE 250 ALT 637 W/ HCPCS: Performed by: INTERNAL MEDICINE

## 2025-03-16 PROCEDURE — 27000221 HC OXYGEN, UP TO 24 HOURS

## 2025-03-16 PROCEDURE — 94760 N-INVAS EAR/PLS OXIMETRY 1: CPT

## 2025-03-16 PROCEDURE — 97162 PT EVAL MOD COMPLEX 30 MIN: CPT

## 2025-03-16 RX ADMIN — LACTULOSE 20 G: 10 SOLUTION ORAL at 09:03

## 2025-03-16 RX ADMIN — HYPROMELLOSE 2910 2 DROP: 5 SOLUTION/ DROPS OPHTHALMIC at 05:03

## 2025-03-16 RX ADMIN — BUDESONIDE 3 MG: 3 CAPSULE, COATED PELLETS ORAL at 08:03

## 2025-03-16 RX ADMIN — MUPIROCIN: 20 OINTMENT TOPICAL at 09:03

## 2025-03-16 RX ADMIN — POTASSIUM CHLORIDE 20 MEQ: 1500 TABLET, EXTENDED RELEASE ORAL at 09:03

## 2025-03-16 RX ADMIN — FLUTICASONE PROPIONATE 50 MCG: 50 SPRAY, METERED NASAL at 08:03

## 2025-03-16 RX ADMIN — BUDESONIDE 3 MG: 3 CAPSULE, COATED PELLETS ORAL at 09:03

## 2025-03-16 RX ADMIN — PANTOPRAZOLE SODIUM 40 MG: 40 TABLET, DELAYED RELEASE ORAL at 09:03

## 2025-03-16 RX ADMIN — FLUTICASONE FUROATE AND VILANTEROL TRIFENATATE 1 PUFF: 100; 25 POWDER RESPIRATORY (INHALATION) at 09:03

## 2025-03-16 RX ADMIN — ASPIRIN 81 MG: 81 TABLET, COATED ORAL at 09:03

## 2025-03-16 RX ADMIN — HYPROMELLOSE 2910 2 DROP: 5 SOLUTION/ DROPS OPHTHALMIC at 10:03

## 2025-03-16 RX ADMIN — HYDROCHLOROTHIAZIDE 25 MG: 25 TABLET ORAL at 09:03

## 2025-03-16 RX ADMIN — HYPROMELLOSE 2910 2 DROP: 5 SOLUTION/ DROPS OPHTHALMIC at 09:03

## 2025-03-16 RX ADMIN — MUPIROCIN: 20 OINTMENT TOPICAL at 08:03

## 2025-03-16 RX ADMIN — SODIUM CHLORIDE, POTASSIUM CHLORIDE, SODIUM LACTATE AND CALCIUM CHLORIDE: 600; 310; 30; 20 INJECTION, SOLUTION INTRAVENOUS at 04:03

## 2025-03-16 RX ADMIN — FUROSEMIDE 20 MG: 20 TABLET ORAL at 05:03

## 2025-03-16 RX ADMIN — GABAPENTIN 300 MG: 300 CAPSULE ORAL at 08:03

## 2025-03-16 RX ADMIN — ACETAMINOPHEN 500 MG: 500 TABLET ORAL at 08:03

## 2025-03-16 RX ADMIN — ZOLPIDEM TARTRATE 10 MG: 5 TABLET, FILM COATED ORAL at 08:03

## 2025-03-16 RX ADMIN — GABAPENTIN 300 MG: 300 CAPSULE ORAL at 09:03

## 2025-03-16 RX ADMIN — THERA TABS 1 TABLET: TAB at 09:03

## 2025-03-16 RX ADMIN — CETIRIZINE HYDROCHLORIDE 10 MG: 10 TABLET, FILM COATED ORAL at 09:03

## 2025-03-16 RX ADMIN — TRAMADOL HYDROCHLORIDE 50 MG: 50 TABLET, COATED ORAL at 03:03

## 2025-03-16 RX ADMIN — FLUTICASONE PROPIONATE 50 MCG: 50 SPRAY, METERED NASAL at 09:03

## 2025-03-16 RX ADMIN — GABAPENTIN 300 MG: 300 CAPSULE ORAL at 03:03

## 2025-03-16 RX ADMIN — LISINOPRIL 20 MG: 20 TABLET ORAL at 09:03

## 2025-03-16 NOTE — PROGRESS NOTES
[facility]                       [address] [address]    PATIENT NAME:       ЮЛИЯ NUNEZ   YOB: 1960  CSN:                [id number]   MRN:                [id number]  ADMIT DATE:         03/14/2025 10:20:00  PHYSICIAN:          Sarabjit Arreaga MD                            PROGRESS NOTE    DATE:  03/15/2025 00:00:00    SUBJECTIVE:  The patient is a 64-year-old  male, who came back   to this hospital after being treated for COVID infection.  He was complaining of   right-sided weakness and slurred speech, and was seen by the neurologist.    Apparently, CT scan of the head revealed there was no acute CVA and neurologist   thinks also that there is nothing at this time to be done for his symptoms.  It   appeared that he is recovering pretty good, it could be a residual of that   COVID.  Today, he appeared to be alert and oriented, in no acute distress.  He   has no slurred speech.  He is still complaining that his pain in the lower   extremity is still in the right leg.  We will order some physical therapy for a   few days and see.    OBJECTIVE:  HEART:  The patient has S1, S2.  No murmur or gallop.  CHEST:  Clear.  No wheezing or rales.  ABDOMEN:  Soft, nontender.  Good bowel sounds.  EXTREMITIES:  Extremity superior with good range of motion.  Extremity inferior   with right knee pain.    IMPRESSION:    1. Recent COVID infection.  2. Generalized pain.  3. Hypoxia.    4. Hypertension.    5. Chronic interstitial lung disease.    6. Osteoarthritis .    7. Gastroesophageal reflux.    8. Venous insufficiency.    PLAN:  We will order physical therapy for the patient and see as soon as the   patient is stable, and we will discharge the patient home.    ______________________________  Sarabjit Arreaga MD    CHL/AQS  DD:  03/15/2025  Time:  06:32PM  DT:  03/15/2025  Time:  08:39PM  Job #:   [id number]      PROGRESS NOTE

## 2025-03-16 NOTE — PT/OT/SLP EVAL
Physical Therapy Evaluation    Patient Name:  Vic Meng   MRN:  [id number]    Recommendations:     Discharge therapy intensity: Low Intensity Therapy   Discharge Equipment Recommendations: oxygen   Barriers to discharge: None    Assessment:     Vic Meng is a 64 y.o. male admitted with a medical diagnosis of covid 19+.  He presents with the following impairments/functional limitations: weakness, impaired endurance, impaired self care skills, impaired functional mobility, gait instability, impaired balance.     Pt demonstrates decreased activity tolerance and mobility. Pt would benefit from continued acute PT, and d/c home with low intensity therapies. Notably, pt desaturated to 80-81% on 2L/minO2 when ambulating around the room. Increased O2 to 4L/minO2 to recover to 92%. Then placed pt back on 2L/minO2.     Rehab Prognosis: good; patient would benefit from acute skilled PT services to address these deficits and reach maximum level of function.    Recent Surgery: * No surgery found *      Plan:     During this hospitalization, patient would benefit from acute PT services 5 x/week to address the identified rehab impairments via gait training, therapeutic activities, therapeutic exercises and progress toward the following goals:    Plan of Care Expires:  04/16/25    Subjective     Chief Complaint: wrist pain and swelling  Patient/Family Comments/goals: to get better and go home  Pain/Comfort:  Location - Side 1: Bilateral  Location 1: wrist  Pain Addressed 1: Reposition    Patients cultural, spiritual, Islam conflicts given the current situation: no    Living Environment:  Living with daughter and grandchildren. No steps to enter.  Prior to admission, patients level of function was ambulatory with rollator and quad cane.  Equipment used at home: wheelchair, rollator, cane, quad.  Upon discharge, patient will have assistance from family.    Objective:     Communicated with nurse prior to session.  Patient  found supine with oxygen, telemetry, pulse ox (continuous), peripheral IV  upon PT entry to room.    General Precautions: Standard, droplet, airborne, contact  Orthopedic Precautions:    Braces: N/A  Respiratory Status: Nasal cannula, flow 2 L/min      Exams:  Cognitive Exam:  Patient is oriented to Person, Place, Time, and Situation  RLE ROM: WNL  RLE Strength: Deficits: grossly 4/5  LLE ROM: WNL  LLE Strength: Deficits: grossly 4/5  Skin integrity: Visible skin intact      Functional Mobility:  Bed Mobility:     Scooting: contact guard assistance  Supine to Sit: minimum assistance  Transfers:     Sit to Stand:  contact guard assistance with rolling walker  Gait: 40ft in room with RW and CGA, forward flexed posture. Slow pace.       AM-PAC 6 CLICK MOBILITY  Total Score:16       Treatment & Education:    Patient provided with verbal education education regarding PT role/goals/POC, fall prevention, and safety awareness.  Understanding was verbalized.     Patient left up in chair with all lines intact and call button in reach.    GOALS:   Multidisciplinary Problems       Physical Therapy Goals          Problem: Physical Therapy    Goal Priority Disciplines Outcome Interventions   Physical Therapy Goal     PT, PT/OT Progressing    Description: Goals to be met by: 2025     Patient will increase functional independence with mobility by performin. Supine to sit with Modified [place]  2. Sit to stand transfer with Modified [place]  3. Gait  x 150 feet with Modified [place] using Rolling Walker.                          History:     Past Medical History:   Diagnosis Date    ADELA positive     Antisynthetase syndrome     Crohn's colitis     Diabetes mellitus     GERD (gastroesophageal reflux disease)     Herpes zoster     Hypertension     ILD (interstitial lung disease)     Lymphadenopathy     Morbid obesity     Osteoarthritis of right knee     Rheumatoid arthritis     Snoring     Supraclavicular  mass     Thyroid nodule     Venous insufficiency of lower extremity        Past Surgical History:   Procedure Laterality Date    Drainage of right thyroid gland lobe  10/07/2021    Laser surgery left leg Left     Right hand surgery         Time Tracking:     PT Received On: 03/16/25  PT Start Time: 1148     PT Stop Time: 1220  PT Total Time (min): 32 min     Billable Minutes: Evaluation 32      03/16/2025

## 2025-03-16 NOTE — PLAN OF CARE
Problem: Physical Therapy  Goal: Physical Therapy Goal  Description: Goals to be met by: 2025     Patient will increase functional independence with mobility by performin. Supine to sit with Modified [place]  2. Sit to stand transfer with Modified [place]  3. Gait  x 150 feet with Modified [place] using Rolling Walker.     Outcome: Progressing

## 2025-03-17 VITALS
TEMPERATURE: 99 F | BODY MASS INDEX: 30.8 KG/M2 | SYSTOLIC BLOOD PRESSURE: 126 MMHG | OXYGEN SATURATION: 95 % | RESPIRATION RATE: 18 BRPM | DIASTOLIC BLOOD PRESSURE: 72 MMHG | WEIGHT: 240 LBS | HEART RATE: 103 BPM | HEIGHT: 74 IN

## 2025-03-17 PROBLEM — R29.818 ACUTE FOCAL NEUROLOGICAL DEFICIT: Status: RESOLVED | Noted: 2025-03-14 | Resolved: 2025-03-17

## 2025-03-17 PROCEDURE — 27000207 HC ISOLATION

## 2025-03-17 PROCEDURE — 97110 THERAPEUTIC EXERCISES: CPT | Mod: CQ

## 2025-03-17 PROCEDURE — 99900035 HC TECH TIME PER 15 MIN (STAT)

## 2025-03-17 PROCEDURE — 99900031 HC PATIENT EDUCATION (STAT)

## 2025-03-17 PROCEDURE — 25000003 PHARM REV CODE 250: Performed by: INTERNAL MEDICINE

## 2025-03-17 PROCEDURE — 21400001 HC TELEMETRY ROOM

## 2025-03-17 PROCEDURE — 25000242 PHARM REV CODE 250 ALT 637 W/ HCPCS: Performed by: INTERNAL MEDICINE

## 2025-03-17 PROCEDURE — 97530 THERAPEUTIC ACTIVITIES: CPT | Mod: CQ

## 2025-03-17 PROCEDURE — 27000221 HC OXYGEN, UP TO 24 HOURS

## 2025-03-17 PROCEDURE — 97116 GAIT TRAINING THERAPY: CPT | Mod: CQ

## 2025-03-17 RX ADMIN — GABAPENTIN 300 MG: 300 CAPSULE ORAL at 08:03

## 2025-03-17 RX ADMIN — FUROSEMIDE 20 MG: 20 TABLET ORAL at 05:03

## 2025-03-17 RX ADMIN — HYPROMELLOSE 2910 2 DROP: 5 SOLUTION/ DROPS OPHTHALMIC at 02:03

## 2025-03-17 RX ADMIN — CETIRIZINE HYDROCHLORIDE 10 MG: 10 TABLET, FILM COATED ORAL at 08:03

## 2025-03-17 RX ADMIN — THERA TABS 1 TABLET: TAB at 08:03

## 2025-03-17 RX ADMIN — PANTOPRAZOLE SODIUM 40 MG: 40 TABLET, DELAYED RELEASE ORAL at 08:03

## 2025-03-17 RX ADMIN — HYPROMELLOSE 2910 2 DROP: 5 SOLUTION/ DROPS OPHTHALMIC at 06:03

## 2025-03-17 RX ADMIN — ASPIRIN 81 MG: 81 TABLET, COATED ORAL at 08:03

## 2025-03-17 RX ADMIN — GABAPENTIN 300 MG: 300 CAPSULE ORAL at 02:03

## 2025-03-17 RX ADMIN — FLUTICASONE FUROATE AND VILANTEROL TRIFENATATE 1 PUFF: 100; 25 POWDER RESPIRATORY (INHALATION) at 08:03

## 2025-03-17 RX ADMIN — TRAMADOL HYDROCHLORIDE 50 MG: 50 TABLET, COATED ORAL at 04:03

## 2025-03-17 RX ADMIN — HYDROCHLOROTHIAZIDE 25 MG: 25 TABLET ORAL at 08:03

## 2025-03-17 RX ADMIN — LISINOPRIL 20 MG: 20 TABLET ORAL at 08:03

## 2025-03-17 RX ADMIN — HYPROMELLOSE 2910 2 DROP: 5 SOLUTION/ DROPS OPHTHALMIC at 08:03

## 2025-03-17 RX ADMIN — HYPROMELLOSE 2910 2 DROP: 5 SOLUTION/ DROPS OPHTHALMIC at 11:03

## 2025-03-17 RX ADMIN — MUPIROCIN: 20 OINTMENT TOPICAL at 08:03

## 2025-03-17 RX ADMIN — BUDESONIDE 3 MG: 3 CAPSULE, COATED PELLETS ORAL at 08:03

## 2025-03-17 RX ADMIN — LACTULOSE 20 G: 10 SOLUTION ORAL at 08:03

## 2025-03-17 RX ADMIN — FLUTICASONE PROPIONATE 50 MCG: 50 SPRAY, METERED NASAL at 08:03

## 2025-03-17 RX ADMIN — POTASSIUM CHLORIDE 20 MEQ: 1500 TABLET, EXTENDED RELEASE ORAL at 08:03

## 2025-03-17 NOTE — PT/OT/SLP PROGRESS
Physical Therapy Treatment    Patient Name:  Vic Meng   MRN:  [id number]    Recommendations:     Discharge therapy intensity: Low Intensity Therapy   Discharge Equipment Recommendations: oxygen  Barriers to discharge: Impaired mobility    Assessment:     Vic Meng is a 64 y.o. male admitted with a medical diagnosis of  covid 19+.  He presents with the following impairments/functional limitations: weakness, impaired endurance, impaired self care skills, impaired functional mobility, gait instability, impaired balance.  He presents with the following impairments/functional limitations: weakness, impaired endurance, impaired self care skills, impaired functional mobility, gait instability, impaired balance.    Rehab Prognosis: Good; patient would benefit from acute skilled PT services to address these deficits and reach maximum level of function.    Recent Surgery: * No surgery found *      Plan:     During this hospitalization, patient would benefit from acute PT services 5 x/week to address the identified rehab impairments via gait training, therapeutic activities, therapeutic exercises and progress toward the following goals:    Plan of Care Expires:  04/16/25    Subjective     Chief Complaint: cough  Patient/Family Comments/goals: to go home  Pain/Comfort:  Pain Rating 1:  (various joints w/ pain due to arthritis, did not rate but appeared to be moderate to severe.)      Objective:     Communicated with pts nurse prior to session.  Patient found HOB elevated with oxygen, telemetry, pulse ox (continuous), peripheral IV upon PT entry to room.     General Precautions: Standard, droplet, airborne, contact  Orthopedic Precautions: N/A  Braces: N/A  Respiratory Status: Nasal cannula, flow 2 L/min used 3L during activity due to levels decreasing w/ activity.  Sat b/t 88 to 92 w/ 3L  Blood Pressure: 143/89  Skin Integrity: Visible skin intact      Functional Mobility:  Bed Mobility:     Rolling Right:  supervision  Scooting: stand by assistance  Supine to Sit: contact guard assistance  Sit to Supine: contact guard assistance  Transfers:     Sit to Stand:  contact guard assistance and minimum assistance with no AD, rolling walker, and from various surfaces w/ emphasis on use of UE's to push to stand, forward lean and loading legs prior to extending torso to control balance.   Bed to Chair: stand by assistance with  rolling walker  using  Step Transfer  Toilet Transfer: stand by assistance and contact guard assistance with  rolling walker  using  Step Transfer  Gait: Pt ambulated in room and bathroom x 2 trials working on safety negotiating obstacles, opening/closing doors, side stepping and stepping backwards to improve safety within home  Balance: Pt had one LOB coming to stand, requiring he come back to sit, no LOB during gait and t/f's    Therapeutic Activities/Exercises:  The pt was directed in B LE exercises, seated and supine to improve muscle activation and joint mobility due to arthritic condition.  Pt performed all exercises w/o difficulty x 10 reps each.    Education:  Patient provided with verbal education and demonstrations education regarding fall prevention, safety awareness, home exercise program, and trasnfers .  Understanding was verbalized.     Patient left HOB elevated with all lines intact, call button in reach, and nurse notified    GOALS:   Multidisciplinary Problems       Physical Therapy Goals          Problem: Physical Therapy    Goal Priority Disciplines Outcome Interventions   Physical Therapy Goal     PT, PT/OT Progressing    Description: Goals to be met by: 2025     Patient will increase functional independence with mobility by performin. Supine to sit with Modified [place]  2. Sit to stand transfer with Modified [place]  3. Gait  x 150 feet with Modified [place] using Rolling Walker.                          Time Tracking:     PT Received On: 25  PT  Start Time: 1451     PT Stop Time: 1529  PT Total Time (min): 38 min     Billable Minutes: Gait Training 12 min, Therapeutic Activity 12 min, and Therapeutic Exercise 14 min    Treatment Type: Treatment  PT/PTA: PTA     Number of PTA visits since last PT visit: 1 03/17/2025

## 2025-03-17 NOTE — PLAN OF CARE
03/17/25 1000   Discharge Assessment   Assessment Type Discharge Planning Assessment   Confirmed/corrected address, phone number and insurance Yes   Confirmed Demographics Contacted registration to update  (Now lives with daughter - [address] [id number])   When was your last doctors appointment?   (Dr. Arreaga - few weeks ago)   Communicated DAISY with patient/caregiver Date not available/Unable to determine   Reason For Admission Covid, SOB, weakness   People in Home child(denise), adult   Do you expect to return to your current living situation? Yes   Do you have help at home or someone to help you manage your care at home? Yes   Who are your caregiver(s) and their phone number(s)? Daughter Lindsay Meng [phone]   Prior to hospitilization cognitive status: Alert/Oriented   Current cognitive status: Alert/Oriented   Walking or Climbing Stairs Difficulty yes   Walking or Climbing Stairs ambulation difficulty, requires equipment   Mobility Management wheelchair   Dressing/Bathing Difficulty yes   Dressing/Bathing bathing difficulty, assistance 1 person;bathing difficulty, requires equipment   Dressing/Bathing Management shower chair   Equipment Currently Used at Home oxygen;shower chair   Readmission within 30 days? Yes   Patient currently being followed by outpatient case management? No   Do you currently have service(s) that help you manage your care at home? No   Do you take prescription medications? Yes   Do you have prescription coverage? Yes   Coverage [employer]   Do you have any problems affording any of your prescribed medications? No   Is the patient taking medications as prescribed? yes   Who is going to help you get home at discharge? Pankaj Montoya   How do you get to doctors appointments? family or friend will provide   Are you on dialysis? No   Do you take coumadin? No   Discharge Plan A Home Health   Discharge Plan B Home with family   DME Needed Upon Discharge  other (see  comments)  (TBD)   Discharge Plan discussed with: Spouse/sig other;Adult children   Name(s) and Number(s) Lindsay - daughter [phone]   Transition of Care Barriers Underinsured   OTHER   Name(s) of People in Home Daughter Lindsay     Patient readmitted for Covid and weakness.  Does not have home health.  Daughter states home oxygen was just delivered on Friday. Discussed need for possible home health with daughter. She does not have a preference for company.  FOC obtained. PCP is Dr. Sarabjit Arreaga. Pharmacy changed to [email or web address] [place] as patient now lives fulltime with his daughter.  Referral for [facility] sent to [facility].  Will follow for additional discharge planning needs.

## 2025-03-17 NOTE — PLAN OF CARE
Problem: Adult Inpatient Plan of Care  Goal: Plan of Care Review  Outcome: Met  Goal: Patient-Specific Goal (Individualized)  Outcome: Met  Goal: Absence of Hospital-Acquired Illness or Injury  Outcome: Met  Goal: Optimal Comfort and Wellbeing  Outcome: Met  Goal: Readiness for Transition of Care  Outcome: Met     Problem: Pneumonia  Goal: Fluid Balance  Outcome: Met  Goal: Resolution of Infection Signs and Symptoms  Outcome: Met  Goal: Effective Oxygenation and Ventilation  Outcome: Met     Problem: Diabetes Comorbidity  Goal: Blood Glucose Level Within Targeted Range  Outcome: Met     Problem: Skin Injury Risk Increased  Goal: Skin Health and Integrity  Outcome: Met       Patient to be discharged today. Discharge instructions were given and explained to patient and he verbalized understanding. Patient was assisted in dressing and packing up his belongings.Patient stated he called for his daughter to come .Still waiting for his daughter.

## 2025-03-17 NOTE — PROGRESS NOTES
[facility]                       [address] [address]    PATIENT NAME:       ЮЛИЯ NUNEZ   YOB: 1960  CSN:                [id number]   MRN:                [id number]  ADMIT DATE:         03/14/2025 10:20:00  PHYSICIAN:          Sarabjit Arreaga MD                            PROGRESS NOTE    DATE:  03/16/2025 00:00:00    SUBJECTIVE:  The patient is a 64-year-old  male, who was   admitted, was treated recently for COVID  and he came back complaining of he   could not walk, his knee and generalized malaise and pain, and also he was   complaining of shortness of breath, because the patient was supposed to have   oxygen at home.  At the time of discharge, there was no oxygen.  Apparently,   patient is doing a lot better.  I would order physical therapy and we will see   if we can get the patient to have physical therapy at home.    OBJECTIVE:  HEART:  The patient has S1, S2.  No murmur or gallop.  CHEST:  Clear.  No wheezing, no rales.  ABDOMEN:  Soft, nontender.  Good bowel sounds.  EXTREMITIES:  Extremity superior with good range of motion.  Extremity inferior   with right knee pain.    IMPRESSION:    1. Recent COVID infection.  2. Hypoxia.  3. Hypertension.  4. Chronic interstitial lung disease.  5. Osteoarthritis.  6. Gastroesophageal reflux.  7. Venous insufficiency.    PLAN:  We will see tomorrow that the patient can be discharged to have his   physical therapy at home.  I think he probably will qualify for that.        ______________________________  Sarabjit Arreaga MD    CHL/AQS  DD:  03/16/2025  Time:  07:33PM  DT:  03/16/2025  Time:  10:39PM  Job #:  [id number]      PROGRESS NOTE

## 2025-03-18 NOTE — DISCHARGE SUMMARY
[facility]                       [address] [address]    PATIENT NAME:       ЮЛИЯ NUNEZ   YOB: 1960  CSN:                [id number]   MRN:                [id number]  ADMIT DATE:         03/14/2025 10:20:00  PHYSICIAN:          Sarabjit Arreaga MD                          DISCHARGE SUMMARY    DATE OF DISCHARGE:  03/17/2025 00:00:00    FINAL DIAGNOSES:    1. Acute neurological deficit.  2. COVID-19 infection.  3. Hypertension.  4. Chronic interstitial lung disease.  5. Osteoarthritis.  6. Gastroesophageal reflex.  7. Hypoxia.    HOSPITAL COURSE:  The patient, who is a 64-year-old  male,   recently discharged from the hospital after having a treatment with remdesivir   for COVID-19 infection.  He came back to the hospital complaining of slurred   speech and generalized pain and right leg weakness.  Therefore, the patient was   evaluated and admitted and a CT scan of the head was done and revealed there was   no acute neurological finding.  However, he did not take long for those   symptoms to vanish except the patient had some difficulty with his knee and leg   and was in pain also.  I did order physical therapy.  Apparently, Physical   Therapy recommended that the patient is stable to go home and to continue   physical therapy at home.  Therefore, we found the patient have home health to   continue physical therapy at home.  At the time of discharge, there was no   slurred speech.  The patient communicated perfectly well.  We will discharge the   patient to home and to continue his physical therapy at home.        ______________________________  Sarabjit Arreaga MD    CHL/AQS  DD:  03/17/2025  Time:  02:56PM  DT:  03/18/2025  Time:  12:04AM  Job #:  [id number]      DISCHARGE SUMMARY

## 2025-04-10 ENCOUNTER — HOSPITAL ENCOUNTER (INPATIENT)
Facility: HOSPITAL | Age: 65
LOS: 7 days | Discharge: HOME HEALTH | DRG: 189 | End: 2025-04-17
Attending: INTERNAL MEDICINE | Admitting: INTERNAL MEDICINE
Payer: MEDICARE

## 2025-04-10 DIAGNOSIS — D89.89 ANTISYNTHETASE SYNDROME: ICD-10-CM

## 2025-04-10 DIAGNOSIS — J96.21 ACUTE ON CHRONIC HYPOXIC RESPIRATORY FAILURE: Primary | ICD-10-CM

## 2025-04-10 DIAGNOSIS — M17.11 PRIMARY OSTEOARTHRITIS OF RIGHT KNEE: ICD-10-CM

## 2025-04-10 DIAGNOSIS — I87.2 VENOUS INSUFFICIENCY OF LOWER EXTREMITY: ICD-10-CM

## 2025-04-10 DIAGNOSIS — J84.9 INTERSTITIAL LUNG DISEASE: ICD-10-CM

## 2025-04-10 DIAGNOSIS — R06.02 SHORTNESS OF BREATH: ICD-10-CM

## 2025-04-10 DIAGNOSIS — M25.561 RIGHT KNEE PAIN: ICD-10-CM

## 2025-04-10 DIAGNOSIS — M05.79 RHEUMATOID ARTHRITIS INVOLVING MULTIPLE SITES WITH POSITIVE RHEUMATOID FACTOR: ICD-10-CM

## 2025-04-10 DIAGNOSIS — J40 BRONCHITIS: ICD-10-CM

## 2025-04-10 DIAGNOSIS — M17.11 OSTEOARTHRITIS OF RIGHT KNEE, UNSPECIFIED OSTEOARTHRITIS TYPE: ICD-10-CM

## 2025-04-10 LAB
ALBUMIN SERPL-MCNC: 2.4 G/DL (ref 3.4–4.8)
ALBUMIN/GLOB SERPL: 0.4 RATIO (ref 1.1–2)
ALLENS TEST BLOOD GAS (OHS): YES
ALP SERPL-CCNC: 88 UNIT/L (ref 40–150)
ALT SERPL-CCNC: 7 UNIT/L (ref 0–55)
ANION GAP SERPL CALC-SCNC: 9 MEQ/L
AST SERPL-CCNC: 11 UNIT/L (ref 11–45)
BASE EXCESS BLD CALC-SCNC: 3.5 MMOL/L (ref -2–2)
BASOPHILS # BLD AUTO: 0.06 X10(3)/MCL
BASOPHILS NFR BLD AUTO: 0.7 %
BILIRUB SERPL-MCNC: 0.5 MG/DL
BLOOD GAS SAMPLE TYPE (OHS): ABNORMAL
BUN SERPL-MCNC: 23.4 MG/DL (ref 8.4–25.7)
CA-I BLD-SCNC: 1.08 MMOL/L (ref 1.12–1.23)
CALCIUM SERPL-MCNC: 8.7 MG/DL (ref 8.8–10)
CHLORIDE SERPL-SCNC: 105 MMOL/L (ref 98–107)
CO2 BLDA-SCNC: 29.9 MMOL/L
CO2 SERPL-SCNC: 27 MMOL/L (ref 23–31)
COHGB MFR BLDA: 1.7 % (ref 0.5–1.5)
CREAT SERPL-MCNC: 0.91 MG/DL (ref 0.72–1.25)
CREAT/UREA NIT SERPL: 26
DRAWN BY BLOOD GAS (OHS): ABNORMAL
EOSINOPHIL # BLD AUTO: 0.48 X10(3)/MCL (ref 0–0.9)
EOSINOPHIL NFR BLD AUTO: 5.4 %
ERYTHROCYTE [DISTWIDTH] IN BLOOD BY AUTOMATED COUNT: 15.8 % (ref 11.5–17)
FLUAV AG UPPER RESP QL IA.RAPID: NOT DETECTED
FLUBV AG UPPER RESP QL IA.RAPID: NOT DETECTED
GFR SERPLBLD CREATININE-BSD FMLA CKD-EPI: >60 ML/MIN/1.73/M2
GLOBULIN SER-MCNC: 6.1 GM/DL (ref 2.4–3.5)
GLUCOSE SERPL-MCNC: 110 MG/DL (ref 82–115)
HCO3 BLDA-SCNC: 28.5 MMOL/L (ref 22–26)
HCT VFR BLD AUTO: 32.8 % (ref 42–52)
HGB BLD-MCNC: 10.5 G/DL (ref 14–18)
IMM GRANULOCYTES # BLD AUTO: 0.05 X10(3)/MCL (ref 0–0.04)
IMM GRANULOCYTES NFR BLD AUTO: 0.6 %
LPM (OHS): 3
LYMPHOCYTES # BLD AUTO: 1.1 X10(3)/MCL (ref 0.6–4.6)
LYMPHOCYTES NFR BLD AUTO: 12.3 %
MAGNESIUM SERPL-MCNC: 1.9 MG/DL (ref 1.6–2.6)
MCH RBC QN AUTO: 26.3 PG (ref 27–31)
MCHC RBC AUTO-ENTMCNC: 32 G/DL (ref 33–36)
MCV RBC AUTO: 82.2 FL (ref 80–94)
METHGB MFR BLDA: 0.9 % (ref 0.4–1.5)
MONOCYTES # BLD AUTO: 0.89 X10(3)/MCL (ref 0.1–1.3)
MONOCYTES NFR BLD AUTO: 10 %
NEUTROPHILS # BLD AUTO: 6.36 X10(3)/MCL (ref 2.1–9.2)
NEUTROPHILS NFR BLD AUTO: 71 %
NRBC BLD AUTO-RTO: 0 %
O2 HB BLOOD GAS (OHS): 93.2 % (ref 94–97)
OHS QRS DURATION: 138 MS
OHS QTC CALCULATION: 500 MS
OXYGEN DEVICE BLOOD GAS (OHS): ABNORMAL
OXYHGB MFR BLDA: 10.4 G/DL (ref 12–16)
PCO2 BLDA: 44 MMHG (ref 35–45)
PH BLDA: 7.42 [PH] (ref 7.35–7.45)
PLATELET # BLD AUTO: 318 X10(3)/MCL (ref 130–400)
PMV BLD AUTO: 10.7 FL (ref 7.4–10.4)
PO2 BLDA: 72 MMHG (ref 80–100)
POTASSIUM BLOOD GAS (OHS): 3.4 MMOL/L (ref 3.5–5)
POTASSIUM SERPL-SCNC: 3.4 MMOL/L (ref 3.5–5.1)
PROT SERPL-MCNC: 8.5 GM/DL (ref 5.8–7.6)
RBC # BLD AUTO: 3.99 X10(6)/MCL (ref 4.7–6.1)
RSV A 5' UTR RNA NPH QL NAA+PROBE: NOT DETECTED
SAMPLE SITE BLOOD GAS (OHS): ABNORMAL
SAO2 % BLDA: 94.6 %
SARS-COV-2 RNA RESP QL NAA+PROBE: NOT DETECTED
SODIUM BLOOD GAS (OHS): 135 MMOL/L (ref 137–145)
SODIUM SERPL-SCNC: 141 MMOL/L (ref 136–145)
TROPONIN I SERPL-MCNC: 0.02 NG/ML (ref 0–0.04)
WBC # BLD AUTO: 8.94 X10(3)/MCL (ref 4.5–11.5)

## 2025-04-10 PROCEDURE — 25000003 PHARM REV CODE 250: Performed by: INTERNAL MEDICINE

## 2025-04-10 PROCEDURE — 0241U COVID/RSV/FLU A&B PCR: CPT | Performed by: INTERNAL MEDICINE

## 2025-04-10 PROCEDURE — 93010 ELECTROCARDIOGRAM REPORT: CPT | Mod: ,,, | Performed by: INTERNAL MEDICINE

## 2025-04-10 PROCEDURE — 83735 ASSAY OF MAGNESIUM: CPT | Performed by: INTERNAL MEDICINE

## 2025-04-10 PROCEDURE — 80053 COMPREHEN METABOLIC PANEL: CPT | Performed by: INTERNAL MEDICINE

## 2025-04-10 PROCEDURE — 63600175 PHARM REV CODE 636 W HCPCS: Performed by: INTERNAL MEDICINE

## 2025-04-10 PROCEDURE — 93005 ELECTROCARDIOGRAM TRACING: CPT

## 2025-04-10 PROCEDURE — 84484 ASSAY OF TROPONIN QUANT: CPT | Performed by: INTERNAL MEDICINE

## 2025-04-10 PROCEDURE — 85025 COMPLETE CBC W/AUTO DIFF WBC: CPT | Performed by: INTERNAL MEDICINE

## 2025-04-10 PROCEDURE — 25000242 PHARM REV CODE 250 ALT 637 W/ HCPCS: Performed by: INTERNAL MEDICINE

## 2025-04-10 PROCEDURE — 96374 THER/PROPH/DIAG INJ IV PUSH: CPT

## 2025-04-10 PROCEDURE — 21400001 HC TELEMETRY ROOM

## 2025-04-10 PROCEDURE — 99285 EMERGENCY DEPT VISIT HI MDM: CPT | Mod: 25

## 2025-04-10 RX ORDER — CETIRIZINE HYDROCHLORIDE 10 MG/1
10 TABLET ORAL DAILY
Status: DISCONTINUED | OUTPATIENT
Start: 2025-04-11 | End: 2025-04-17 | Stop reason: HOSPADM

## 2025-04-10 RX ORDER — MORPHINE SULFATE 4 MG/ML
4 INJECTION, SOLUTION INTRAMUSCULAR; INTRAVENOUS EVERY 4 HOURS PRN
Refills: 0 | Status: DISCONTINUED | OUTPATIENT
Start: 2025-04-10 | End: 2025-04-17 | Stop reason: HOSPADM

## 2025-04-10 RX ORDER — FLUTICASONE PROPIONATE 50 MCG
1 SPRAY, SUSPENSION (ML) NASAL 2 TIMES DAILY
Status: DISCONTINUED | OUTPATIENT
Start: 2025-04-10 | End: 2025-04-17 | Stop reason: HOSPADM

## 2025-04-10 RX ORDER — ACETAMINOPHEN 325 MG/1
650 TABLET ORAL EVERY 8 HOURS PRN
Status: DISCONTINUED | OUTPATIENT
Start: 2025-04-10 | End: 2025-04-17 | Stop reason: HOSPADM

## 2025-04-10 RX ORDER — ENOXAPARIN SODIUM 100 MG/ML
40 INJECTION SUBCUTANEOUS EVERY 24 HOURS
Status: DISCONTINUED | OUTPATIENT
Start: 2025-04-10 | End: 2025-04-17 | Stop reason: HOSPADM

## 2025-04-10 RX ORDER — METHYLPREDNISOLONE SOD SUCC 125 MG
125 VIAL (EA) INJECTION
Status: COMPLETED | OUTPATIENT
Start: 2025-04-10 | End: 2025-04-10

## 2025-04-10 RX ORDER — PANTOPRAZOLE SODIUM 40 MG/1
40 TABLET, DELAYED RELEASE ORAL DAILY
Status: DISCONTINUED | OUTPATIENT
Start: 2025-04-11 | End: 2025-04-17 | Stop reason: HOSPADM

## 2025-04-10 RX ORDER — SODIUM CHLORIDE 0.9 % (FLUSH) 0.9 %
10 SYRINGE (ML) INJECTION
Status: DISCONTINUED | OUTPATIENT
Start: 2025-04-10 | End: 2025-04-17 | Stop reason: HOSPADM

## 2025-04-10 RX ORDER — ALBUTEROL SULFATE 90 UG/1
2 INHALANT RESPIRATORY (INHALATION) EVERY 4 HOURS PRN
Status: DISCONTINUED | OUTPATIENT
Start: 2025-04-10 | End: 2025-04-17 | Stop reason: HOSPADM

## 2025-04-10 RX ORDER — ONDANSETRON HYDROCHLORIDE 2 MG/ML
4 INJECTION, SOLUTION INTRAVENOUS EVERY 8 HOURS PRN
Status: DISCONTINUED | OUTPATIENT
Start: 2025-04-10 | End: 2025-04-17 | Stop reason: HOSPADM

## 2025-04-10 RX ORDER — CEFTRIAXONE 2 G/1
2 INJECTION, POWDER, FOR SOLUTION INTRAMUSCULAR; INTRAVENOUS
Status: COMPLETED | OUTPATIENT
Start: 2025-04-10 | End: 2025-04-10

## 2025-04-10 RX ORDER — OXYCODONE HYDROCHLORIDE 5 MG/1
5 TABLET ORAL EVERY 4 HOURS PRN
Refills: 0 | Status: DISCONTINUED | OUTPATIENT
Start: 2025-04-10 | End: 2025-04-17 | Stop reason: HOSPADM

## 2025-04-10 RX ORDER — POTASSIUM CHLORIDE 20 MEQ/1
20 TABLET, EXTENDED RELEASE ORAL DAILY
Status: DISCONTINUED | OUTPATIENT
Start: 2025-04-11 | End: 2025-04-17 | Stop reason: HOSPADM

## 2025-04-10 RX ORDER — GABAPENTIN 300 MG/1
300 CAPSULE ORAL 3 TIMES DAILY
Status: DISCONTINUED | OUTPATIENT
Start: 2025-04-11 | End: 2025-04-17 | Stop reason: HOSPADM

## 2025-04-10 RX ORDER — IPRATROPIUM BROMIDE AND ALBUTEROL SULFATE 2.5; .5 MG/3ML; MG/3ML
3 SOLUTION RESPIRATORY (INHALATION)
Status: DISPENSED | OUTPATIENT
Start: 2025-04-10 | End: 2025-04-10

## 2025-04-10 RX ORDER — ZOLPIDEM TARTRATE 5 MG/1
10 TABLET ORAL NIGHTLY PRN
Status: DISCONTINUED | OUTPATIENT
Start: 2025-04-10 | End: 2025-04-17 | Stop reason: HOSPADM

## 2025-04-10 RX ORDER — LISINOPRIL 20 MG/1
20 TABLET ORAL DAILY
Status: DISCONTINUED | OUTPATIENT
Start: 2025-04-11 | End: 2025-04-17 | Stop reason: HOSPADM

## 2025-04-10 RX ORDER — FLUTICASONE FUROATE AND VILANTEROL 100; 25 UG/1; UG/1
1 POWDER RESPIRATORY (INHALATION) DAILY
Status: DISCONTINUED | OUTPATIENT
Start: 2025-04-11 | End: 2025-04-17 | Stop reason: HOSPADM

## 2025-04-10 RX ORDER — ASPIRIN 81 MG/1
81 TABLET ORAL DAILY
Status: DISCONTINUED | OUTPATIENT
Start: 2025-04-11 | End: 2025-04-17 | Stop reason: HOSPADM

## 2025-04-10 RX ORDER — TALC
6 POWDER (GRAM) TOPICAL NIGHTLY PRN
Status: DISCONTINUED | OUTPATIENT
Start: 2025-04-10 | End: 2025-04-17 | Stop reason: HOSPADM

## 2025-04-10 RX ORDER — LISINOPRIL AND HYDROCHLOROTHIAZIDE 20; 25 MG/1; MG/1
1 TABLET ORAL DAILY
Status: DISCONTINUED | OUTPATIENT
Start: 2025-04-11 | End: 2025-04-10 | Stop reason: SDUPTHER

## 2025-04-10 RX ORDER — BUDESONIDE 3 MG/1
3 CAPSULE, COATED PELLETS ORAL 2 TIMES DAILY
Status: DISCONTINUED | OUTPATIENT
Start: 2025-04-10 | End: 2025-04-17 | Stop reason: HOSPADM

## 2025-04-10 RX ORDER — HYDRALAZINE HYDROCHLORIDE 20 MG/ML
10 INJECTION INTRAMUSCULAR; INTRAVENOUS
Status: DISPENSED | OUTPATIENT
Start: 2025-04-10 | End: 2025-04-11

## 2025-04-10 RX ORDER — HYDROCHLOROTHIAZIDE 25 MG/1
25 TABLET ORAL DAILY
Status: DISCONTINUED | OUTPATIENT
Start: 2025-04-11 | End: 2025-04-17 | Stop reason: HOSPADM

## 2025-04-10 RX ADMIN — METHYLPREDNISOLONE SODIUM SUCCINATE 125 MG: 125 INJECTION, POWDER, FOR SOLUTION INTRAMUSCULAR; INTRAVENOUS at 12:04

## 2025-04-10 RX ADMIN — ENOXAPARIN SODIUM 40 MG: 40 INJECTION SUBCUTANEOUS at 05:04

## 2025-04-10 RX ADMIN — CEFTRIAXONE SODIUM 2 G: 2 INJECTION, POWDER, FOR SOLUTION INTRAMUSCULAR; INTRAVENOUS at 02:04

## 2025-04-10 RX ADMIN — IPRATROPIUM BROMIDE AND ALBUTEROL SULFATE 3 ML: .5; 3 SOLUTION RESPIRATORY (INHALATION) at 12:04

## 2025-04-10 RX ADMIN — AZITHROMYCIN MONOHYDRATE 500 MG: 500 INJECTION, POWDER, LYOPHILIZED, FOR SOLUTION INTRAVENOUS at 02:04

## 2025-04-10 RX ADMIN — OXYCODONE HYDROCHLORIDE 5 MG: 5 TABLET ORAL at 03:04

## 2025-04-10 RX ADMIN — OXYCODONE HYDROCHLORIDE 5 MG: 5 TABLET ORAL at 10:04

## 2025-04-10 RX ADMIN — ZOLPIDEM TARTRATE 10 MG: 5 TABLET, FILM COATED ORAL at 10:04

## 2025-04-10 NOTE — ED PROVIDER NOTES
Encounter Date: 4/10/2025    SCRIBE #1 NOTE: I, Denisse Barber, am scribing for, and in the presence of,  Oleg Lindo DO. I have scribed the entire note.       History     Chief Complaint   Patient presents with    Shortness of Breath     SOB on exertion. Pt normally on 3L NC S/T COPD, Asthma. On 4L NC on arrival, SPO2 94%. EMS reports with exertion, SPO2 70s. Tachypnea noted.      64 year old male with a hx of COPD, DM, HTN and RA presents to the ED for SOB. Pt reports he has been experiencing episodes of SOB for the last 2 months. Pt reports the SOB has worsened over the last few days. Pt also reports a productive cough over the last 2 weeks. Pt reports the sputum is a greenish color. Pt is chronically on 3L supplemental O2. EMS reports the pt's O2 sat dropped to the 70's after walking a few feet. EMS placed the pt on 4L and his O2 jumped back up to 96%. Pt lastly notes an intermittent sharp chest pain. Pt lastly notes that he has chronic right knee pain.     The history is provided by the patient and the EMS personnel. No  was used.     Review of patient's allergies indicates:  No Known Allergies  Past Medical History:   Diagnosis Date    ADELA positive     Antisynthetase syndrome     Crohn's colitis     Diabetes mellitus     GERD (gastroesophageal reflux disease)     Herpes zoster     Hypertension     ILD (interstitial lung disease)     Lymphadenopathy     Morbid obesity     Osteoarthritis of right knee     Rheumatoid arthritis     Snoring     Supraclavicular mass     Thyroid nodule     Venous insufficiency of lower extremity      Past Surgical History:   Procedure Laterality Date    Drainage of right thyroid gland lobe  10/07/2021    Laser surgery left leg Left     Right hand surgery       Family History   Problem Relation Name Age of Onset    Heart disease Mother      Breast cancer Mother      Heart attack Mother       Social History[1]  Review of Systems   Constitutional:  Negative for  fever.   HENT:  Negative for sore throat.    Eyes:  Negative for visual disturbance.   Respiratory:  Positive for cough and shortness of breath.    Cardiovascular:  Positive for chest pain.   Gastrointestinal:  Negative for abdominal pain, blood in stool, constipation, diarrhea, nausea and vomiting.   Endocrine: Negative for polyuria.   Genitourinary:  Negative for dysuria, flank pain and hematuria.   Musculoskeletal:  Negative for back pain.        Right knee pain    Skin:  Negative for rash.   Allergic/Immunologic: Negative for immunocompromised state.   Neurological:  Negative for dizziness, syncope, weakness, light-headedness, numbness and headaches.   Hematological:  Does not bruise/bleed easily.       Physical Exam     Initial Vitals [04/10/25 1138]   BP Pulse Resp Temp SpO2   (!) 188/142 104 (!) 34 98.1 °F (36.7 °C) 97 %      MAP       --         Physical Exam    Constitutional: He appears well-developed and well-nourished. He is cooperative.  Non-toxic appearance. He appears ill. Nasal cannula in place.   HENT:   Head: Normocephalic and atraumatic. Mouth/Throat: Uvula is midline, oropharynx is clear and moist and mucous membranes are normal.   Eyes: Conjunctivae, EOM and lids are normal. Pupils are equal, round, and reactive to light.   Neck: Trachea normal and phonation normal. Neck supple.    Full passive range of motion without pain.     Cardiovascular:  Normal rate, regular rhythm, normal heart sounds and normal pulses.           No murmur heard.  Pulmonary/Chest: No accessory muscle usage. Tachypnea noted. He has decreased breath sounds. He has no wheezes. He has no rhonchi. He has no rales.   Abdominal: Abdomen is soft. Bowel sounds are normal. He exhibits no distension. There is no abdominal tenderness. No hernia. There is no rebound and no guarding.   Musculoskeletal:      Cervical back: Full passive range of motion without pain and neck supple.      Comments: Right knee in brace. Tenderness to the  right knee and joint effusion.      Neurological: He is alert and oriented to person, place, and time. GCS eye subscore is 4. GCS verbal subscore is 5. GCS motor subscore is 6.   Skin: Skin is warm, dry and intact. Capillary refill takes less than 2 seconds. No rash noted.   Psychiatric: He has a normal mood and affect. His speech is normal and behavior is normal. Judgment and thought content normal. Cognition and memory are normal.         ED Course   Critical Care    Date/Time: 4/10/2025 1:30 PM    Performed by: Oleg Lindo DO  Authorized by: Oleg Lindo DO  Direct patient critical care time: 20 minutes  Additional history critical care time: 5 minutes  Ordering / reviewing critical care time: 5 minutes  Documentation critical care time: 5 minutes  Consulting other physicians critical care time: 5 minutes  Total critical care time (exclusive of procedural time) : 40 minutes  Critical care time was exclusive of separately billable procedures and treating other patients.  Critical care was necessary to treat or prevent imminent or life-threatening deterioration of the following conditions: respiratory failure.  Critical care was time spent personally by me on the following activities: development of treatment plan with patient or surrogate, discussions with primary provider, evaluation of patient's response to treatment, examination of patient, obtaining history from patient or surrogate, ordering and performing treatments and interventions, ordering and review of laboratory studies, ordering and review of radiographic studies, pulse oximetry, re-evaluation of patient's condition and review of old charts.        Labs Reviewed   COMPREHENSIVE METABOLIC PANEL - Abnormal       Result Value    Sodium 141      Potassium 3.4 (*)     Chloride 105      CO2 27      Glucose 110      Blood Urea Nitrogen 23.4      Creatinine 0.91      Calcium 8.7 (*)     Protein Total 8.5 (*)     Albumin 2.4 (*)     Globulin 6.1 (*)      Albumin/Globulin Ratio 0.4 (*)     Bilirubin Total 0.5      ALP 88      ALT 7      AST 11      eGFR >60      Anion Gap 9.0      BUN/Creatinine Ratio 26     CBC WITH DIFFERENTIAL - Abnormal    WBC 8.94      RBC 3.99 (*)     Hgb 10.5 (*)     Hct 32.8 (*)     MCV 82.2      MCH 26.3 (*)     MCHC 32.0 (*)     RDW 15.8      Platelet 318      MPV 10.7 (*)     Neut % 71.0      Lymph % 12.3      Mono % 10.0      Eos % 5.4      Basophil % 0.7      Imm Grans % 0.6      Neut # 6.36      Lymph # 1.10      Mono # 0.89      Eos # 0.48      Baso # 0.06      Imm Gran # 0.05 (*)     NRBC% 0.0     TROPONIN I - Normal    Troponin-I 0.020     MAGNESIUM - Normal    Magnesium Level 1.90     COVID/RSV/FLU A&B PCR - Normal    Influenza A PCR Not Detected      Influenza B PCR Not Detected      Respiratory Syncytial Virus PCR Not Detected      SARS-CoV-2 PCR Not Detected      Narrative:     The [employer] Xpress SARS-CoV-2/FLU/RSV plus is a rapid, multiplexed real-time PCR test intended for the simultaneous qualitative detection and differentiation of SARS-CoV-2, Influenza A, Influenza B, and respiratory syncytial virus (RSV) viral RNA in either nasopharyngeal swab or nasal swab specimens.         CBC W/ AUTO DIFFERENTIAL    Narrative:     The following orders were created for panel order CBC Auto Differential.  Procedure                               Abnormality         Status                     ---------                               -----------         ------                     CBC with Differential[[id number]]       Abnormal            Final result                 Please view results for these tests on the individual orders.     EKG Readings: (Independently Interpreted)   Initial Reading: No STEMI. Rhythm: Sinus Tachycardia. Heart Rate: 103. Ectopy: No Ectopy. Conduction: Normal. ST Segments: Normal ST Segments. T Waves: Normal. Axis: Left Axis Deviation. Clinical Impression: Left Ventricular Hypertrophy (LVH)   Done on 4/10/25 at 1151.      Nonspecific intraventricular block.     No changes from EKG on 3/10/25.      ECG Results              EKG 12-lead (Final result)        Collection Time Result Time QRS Duration OHS QTC Calculation    04/10/25 11:51:16 04/10/25 12:55:14 138 500                     Final result by Bruno, Lab In [facility] (04/10/25 12:55:20)                   Narrative:    Test Reason : R06.02,    Vent. Rate : 103 BPM     Atrial Rate : 103 BPM     P-R Int : 194 ms          QRS Dur : 138 ms      QT Int : 382 ms       P-R-T Axes :  56 -42  90 degrees    QTcB Int : 500 ms    Sinus tachycardia  Left axis deviation  Nonspecific intraventricular block  Minimal voltage criteria for LVH, may be normal variant ( David product )  Abnormal ECG  Confirmed by Terrance Cheney ([id number]) on 4/10/2025 12:55:12 PM    Referred By:            Confirmed By: Terrance Cheney                                  Imaging Results              X-Ray Knee Complete 4 Or More Views Right (Final result)  Result time 04/10/25 13:07:46   Procedure changed from X-Ray Knee 3 View Left     Final result by Luna Alex MD (04/10/25 13:07:46)                   Impression:      Arthritis with joint effusion      Electronically signed by: Luna Alex  Date:    04/10/2025  Time:    13:07               Narrative:    EXAMINATION:  XR KNEE COMP 4 OR MORE VIEWS RIGHT    CLINICAL HISTORY:  right knee pain; Pain in right knee    TECHNIQUE:  AP, lateral, oblique and sunrise views of the right knee were performed.    COMPARISON:  None    FINDINGS:  No fracture. No dislocation. There are tricompartmental osteophytes with mild joint space narrowing.  There is a joint effusion.    Patient was imaged in a soft brace with mildly obscures evaluation.                                       X-Ray Chest 1 View (Final result)  Result time 04/10/25 12:03:26      Final result by Rito Kim MD (04/10/25 12:03:26)                   Impression:      Chronic interstitial lung  changes.    No focal consolidative changes otherwise identified      Electronically signed by: Rito Kim  Date:    04/10/2025  Time:    12:03               Narrative:    EXAMINATION:  XR CHEST 1 VIEW    CPT 76580    CLINICAL HISTORY:  shortness of breath;    COMPARISON:  March 14, 2025    FINDINGS:  Examination reveals mediastinal silhouette to be within normal limits cardiac silhouette is not enlarged lung fields reveal some increase in interstitial markings throughout both lung fields similar to previous exams dating back to December 2024 and likely chronic in nature.    No superimposed focal consolidative changes                                       Medications   albuterol-ipratropium 2.5 mg-0.5 mg/3 mL nebulizer solution 3 mL (3 mLs Nebulization Given 4/10/25 1221)   hydrALAZINE injection 10 mg (has no administration in time range)   cefTRIAXone injection 2 g (has no administration in time range)   azithromycin (ZITHROMAX) 500 mg in 0.9% NaCl 250 mL IVPB (admixture device) (has no administration in time range)   methylPREDNISolone sodium succinate injection 125 mg (125 mg Intravenous Given 4/10/25 1221)     Medical Decision Making  64-year-old male presenting with dyspnea    Differential Diagnosis:   Judging by the patient's chief complaint and pertinent history, the patient has the following possible differential diagnoses, including but not limited to the following.  Some of these are deemed to be lower likelihood and some more likely based on my physical exam and history combined with possible lab work and/or imaging studies.   Please see the pertinent studies, and refer to the HPI.  Some of these diagnoses will take further evaluation to fully rule out, perhaps as an outpatient and the patient was encouraged to follow up when discharged for more comprehensive evaluation    COPD, asthma, pneumonia, viral syndrome, PE, pneumothorax, congestive heart failure, CAD, MI, pericarditis, anemia, electrolyte  abnormality, hypotension, pleural effusion    Problems Addressed:  Acute on chronic hypoxic respiratory failure: chronic illness or injury that poses a threat to life or bodily functions  Bronchitis: undiagnosed new problem with uncertain prognosis  Interstitial lung disease: chronic illness or injury with exacerbation, progression, or side effects of treatment that poses a threat to life or bodily functions  Osteoarthritis of right knee, unspecified osteoarthritis type: undiagnosed new problem with uncertain prognosis  Right knee pain: undiagnosed new problem with uncertain prognosis  Shortness of breath: acute illness or injury with systemic symptoms    Amount and/or Complexity of Data Reviewed  External Data Reviewed: labs, radiology, ECG and notes.  Labs: ordered. Decision-making details documented in ED Course.  Radiology: ordered and independent interpretation performed. Decision-making details documented in ED Course.  ECG/medicine tests: ordered and independent interpretation performed. Decision-making details documented in ED Course.    Risk  Prescription drug management.  Decision regarding hospitalization.            Scribe Attestation:   Scribe #1: I performed the above scribed service and the documentation accurately describes the services I performed. I attest to the accuracy of the note.    Attending Attestation:           Physician Attestation for Scribe:  Physician Attestation Statement for Scribe #1: I, Oleg Lindo, , reviewed documentation, as scribed by Denisse Barber in my presence, and it is both accurate and complete.             ED Course as of 04/10/25 1332   Thu Apr 10, 2025   1233 On my independent interpretation, Chest x-ray demonstrates acute on chronic interstitial lung disease but no obvious superimposed pneumonia or effusions.  X-ray of the right knee with some arthritis but no fracture or dislocation or soft tissue mass [MM]   1237 Patient feeling improved after breathing  treatments he still has dyspnea.  [MM]   1252 Troponin I: 0.020 [MM]   1252 Magnesium : 1.90 [MM]   1252 WBC: 8.94 [MM]   1252 Hemoglobin(!): 10.5 [MM]   1252 Platelet Count: 318 [MM]   1252 Sodium: 141 [MM]   1252 Potassium(!): 3.4 [MM]   1252 Chloride: 105 [MM]   1252 CO2: 27 [MM]   1252 Glucose: 110 [MM]   1252 BUN: 23.4 [MM]   1252 Creatinine: 0.91 [MM]   1252 Calcium(!): 8.7 [MM]   1252 BILIRUBIN TOTAL: 0.5 [MM]   1252 ALP: 88 [MM]   1252 ALT: 7 [MM]   1252 AST: 11 [MM]   1312 Influenza A, Molecular: Not Detected [MM]   1312 Influenza B, Molecular: Not Detected [MM]   1312 RSV Ag by Molecular Method: Not Detected [MM]   1312 SARS-CoV2 (COVID-19) Qualitative PCR: Not Detected [MM]   1320 Patient with improvement of breath sounds after breathing treatment.  Blood pressure is improved, still mildly tachycardic and tachypneic.  Any time the patient attempts to ambulate he becomes significantly short of breath and hypoxic.  Symptoms are likely secondary to interstitial lung disease, however he does have green colored sputum.  I would like to admit for hypoxic respiratory failure and dyspnea with exertion, I will speak with patient's PCP about initiating antibiotics [MM]   1326 Case discussed with the patient's PCP, Sarabjit Castaneda, who does accept patient for admission.  He is agreeable with antibiotics, I will start Rocephin and azithromycin for community-acquired. [MM]      ED Course User Index  [MM] Oleg Lindo DO                           Clinical Impression:  Final diagnoses:  [R06.02] Shortness of breath  [M25.561] Right knee pain  [J96.21] Acute on chronic hypoxic respiratory failure (Primary)  [M17.11] Osteoarthritis of right knee, unspecified osteoarthritis type  [J40] Bronchitis  [J84.9] Interstitial lung disease          ED Disposition Condition    Admit Stable                  Oleg Lindo DO  04/10/25 1330         [1]   Social History  Tobacco Use    Smoking status: Former     Current  packs/day: 0.25     Types: Cigarettes    Smokeless tobacco: Never    Tobacco comments:     Only about 2 cigarettes a day    Substance Use Topics    Alcohol use: Not Currently     Comment: One beer a month    Drug use: Never     Comment: Occasionally        Oleg Lindo DO  04/10/25 [id number]

## 2025-04-11 LAB
EST. AVERAGE GLUCOSE BLD GHB EST-MCNC: 128.4 MG/DL
GLUCOSE SERPL-MCNC: 224 MG/DL (ref 70–110)
HBA1C MFR BLD: 6.1 %
POCT GLUCOSE: 253 MG/DL (ref 70–110)
POCT GLUCOSE: 92 MG/DL (ref 70–110)

## 2025-04-11 PROCEDURE — 25000003 PHARM REV CODE 250: Performed by: INTERNAL MEDICINE

## 2025-04-11 PROCEDURE — 63600175 PHARM REV CODE 636 W HCPCS: Performed by: INTERNAL MEDICINE

## 2025-04-11 PROCEDURE — 21400001 HC TELEMETRY ROOM

## 2025-04-11 PROCEDURE — 83036 HEMOGLOBIN GLYCOSYLATED A1C: CPT | Performed by: INTERNAL MEDICINE

## 2025-04-11 PROCEDURE — 36415 COLL VENOUS BLD VENIPUNCTURE: CPT | Performed by: INTERNAL MEDICINE

## 2025-04-11 PROCEDURE — 25000242 PHARM REV CODE 250 ALT 637 W/ HCPCS: Performed by: INTERNAL MEDICINE

## 2025-04-11 RX ORDER — BENZONATATE 100 MG/1
100 CAPSULE ORAL 3 TIMES DAILY PRN
Status: DISCONTINUED | OUTPATIENT
Start: 2025-04-11 | End: 2025-04-17 | Stop reason: HOSPADM

## 2025-04-11 RX ORDER — GLUCAGON 1 MG
1 KIT INJECTION
Status: DISCONTINUED | OUTPATIENT
Start: 2025-04-11 | End: 2025-04-17 | Stop reason: HOSPADM

## 2025-04-11 RX ORDER — INSULIN ASPART 100 [IU]/ML
0-10 INJECTION, SOLUTION INTRAVENOUS; SUBCUTANEOUS
Status: DISCONTINUED | OUTPATIENT
Start: 2025-04-11 | End: 2025-04-17 | Stop reason: HOSPADM

## 2025-04-11 RX ORDER — IBUPROFEN 200 MG
24 TABLET ORAL
Status: DISCONTINUED | OUTPATIENT
Start: 2025-04-11 | End: 2025-04-17 | Stop reason: HOSPADM

## 2025-04-11 RX ORDER — CEFTRIAXONE 1 G/1
1 INJECTION, POWDER, FOR SOLUTION INTRAMUSCULAR; INTRAVENOUS
Status: DISCONTINUED | OUTPATIENT
Start: 2025-04-11 | End: 2025-04-15

## 2025-04-11 RX ORDER — IBUPROFEN 200 MG
16 TABLET ORAL
Status: DISCONTINUED | OUTPATIENT
Start: 2025-04-11 | End: 2025-04-17 | Stop reason: HOSPADM

## 2025-04-11 RX ADMIN — BUDESONIDE 3 MG: 3 CAPSULE, COATED PELLETS ORAL at 09:04

## 2025-04-11 RX ADMIN — THERA TABS 1 TABLET: TAB at 09:04

## 2025-04-11 RX ADMIN — OXYCODONE HYDROCHLORIDE 5 MG: 5 TABLET ORAL at 10:04

## 2025-04-11 RX ADMIN — GABAPENTIN 300 MG: 300 CAPSULE ORAL at 02:04

## 2025-04-11 RX ADMIN — FLUTICASONE PROPIONATE 50 MCG: 50 SPRAY, METERED NASAL at 09:04

## 2025-04-11 RX ADMIN — PANTOPRAZOLE SODIUM 40 MG: 40 TABLET, DELAYED RELEASE ORAL at 09:04

## 2025-04-11 RX ADMIN — GABAPENTIN 300 MG: 300 CAPSULE ORAL at 08:04

## 2025-04-11 RX ADMIN — GABAPENTIN 300 MG: 300 CAPSULE ORAL at 09:04

## 2025-04-11 RX ADMIN — INSULIN ASPART 6 UNITS: 100 INJECTION, SOLUTION INTRAVENOUS; SUBCUTANEOUS at 06:04

## 2025-04-11 RX ADMIN — ZOLPIDEM TARTRATE 10 MG: 5 TABLET, FILM COATED ORAL at 07:04

## 2025-04-11 RX ADMIN — OXYCODONE HYDROCHLORIDE 5 MG: 5 TABLET ORAL at 02:04

## 2025-04-11 RX ADMIN — LISINOPRIL 20 MG: 20 TABLET ORAL at 09:04

## 2025-04-11 RX ADMIN — BUDESONIDE 3 MG: 3 CAPSULE, COATED PELLETS ORAL at 08:04

## 2025-04-11 RX ADMIN — HYDROCHLOROTHIAZIDE 25 MG: 25 TABLET ORAL at 09:04

## 2025-04-11 RX ADMIN — HYPROMELLOSE 2910 2 DROP: 5 SOLUTION/ DROPS OPHTHALMIC at 09:04

## 2025-04-11 RX ADMIN — FLUTICASONE FUROATE AND VILANTEROL TRIFENATATE 1 PUFF: 100; 25 POWDER RESPIRATORY (INHALATION) at 09:04

## 2025-04-11 RX ADMIN — CETIRIZINE HYDROCHLORIDE 10 MG: 10 TABLET, FILM COATED ORAL at 09:04

## 2025-04-11 RX ADMIN — HYPROMELLOSE 2910 2 DROP: 5 SOLUTION/ DROPS OPHTHALMIC at 08:04

## 2025-04-11 RX ADMIN — AZITHROMYCIN MONOHYDRATE 500 MG: 500 INJECTION, POWDER, LYOPHILIZED, FOR SOLUTION INTRAVENOUS at 10:04

## 2025-04-11 RX ADMIN — BENZONATATE 100 MG: 100 CAPSULE ORAL at 11:04

## 2025-04-11 RX ADMIN — METHYLPREDNISOLONE SODIUM SUCCINATE 60 MG: 40 INJECTION, POWDER, FOR SOLUTION INTRAMUSCULAR; INTRAVENOUS at 11:04

## 2025-04-11 RX ADMIN — POTASSIUM CHLORIDE 20 MEQ: 1500 TABLET, EXTENDED RELEASE ORAL at 09:04

## 2025-04-11 RX ADMIN — LACTULOSE 20 G: 10 SOLUTION ORAL at 09:04

## 2025-04-11 RX ADMIN — ASPIRIN 81 MG: 81 TABLET, COATED ORAL at 09:04

## 2025-04-11 RX ADMIN — ENOXAPARIN SODIUM 40 MG: 40 INJECTION SUBCUTANEOUS at 04:04

## 2025-04-11 RX ADMIN — OXYCODONE HYDROCHLORIDE 5 MG: 5 TABLET ORAL at 07:04

## 2025-04-11 RX ADMIN — INSULIN ASPART 2 UNITS: 100 INJECTION, SOLUTION INTRAVENOUS; SUBCUTANEOUS at 07:04

## 2025-04-11 RX ADMIN — CEFTRIAXONE SODIUM 1 G: 1 INJECTION, POWDER, FOR SOLUTION INTRAMUSCULAR; INTRAVENOUS at 09:04

## 2025-04-11 RX ADMIN — METHYLPREDNISOLONE SODIUM SUCCINATE 60 MG: 40 INJECTION, POWDER, FOR SOLUTION INTRAMUSCULAR; INTRAVENOUS at 04:04

## 2025-04-11 NOTE — PROGRESS NOTES
[facility]                       [address] [id number]    PATIENT NAME:       ЮЛИЯ NUNEZ   YOB: 1960  CSN:                [id number]   MRN:                [id number]  ADMIT DATE:         04/10/2025 11:41:00  PHYSICIAN:          Sarabjit Arreaga MD                            PROGRESS NOTE    DATE:      CHIEF COMPLAINT:  The patient with severe shortness of breath.  He was admitted   and started with antibiotics.  Apparently today the patient sounds a little   better but still short of breath.  He was seen by the pulmonologist, Dr. Hope,   and apparently the regimen of Solumedrol, Rocephin, and Zithromax had been added   to his treatments.    PHYSICAL EXAMINATION:  HEART:  The patient has S1, S2.  No murmur or gallop.  CHEST:  Still having some rhonchi mostly in right posterior aspect of the lung.   EXTREMITIES:  Extremity superior, good range of motion.  Extremity inferior,   again patient has trace edema and knee pain.    IMPRESSION:    1. Acute exacerbation of chronic interstitial lung disease.  2. Hypertension.  3. Diabetes mellitus type 2.  4. Osteoarthritis.  5. Gastroesophageal reflux disease.  6. Chronic obstructive pulmonary disease.    PLAN:  We will continue present therapy and relied on pulmonologist's   recommendation.        ______________________________  Sarabjit Arreaga MD    CHL/AQS  DD:  04/11/2025  Time:  03:48PM  DT:  04/11/2025  Time:  05:08PM  Job #:  [id number]      PROGRESS NOTE

## 2025-04-11 NOTE — PLAN OF CARE
04/11/25 1157   Discharge Assessment   Assessment Type Discharge Planning Assessment   Confirmed/corrected address, phone number and insurance Yes   Confirmed Demographics Correct on Facesheet   Source of Information patient  (pt is temporarily living with his dgtr at [address], [place])   Communicated DAISY with patient/caregiver Date not available/Unable to determine   Reason For Admission acute resp failure, SOB   People in Home child(denise), adult  (Pt is currently staying with his dgtr at this time in a single story home with no steps to enter the home)   Do you expect to return to your current living situation? Yes   Do you have help at home or someone to help you manage your care at home? No  (pt's dgtr works 3 jobs)   Prior to hospitilization cognitive status: Unable to Assess   Current cognitive status: Alert/Oriented   Walking or Climbing Stairs Difficulty yes   Walking or Climbing Stairs ambulation difficulty, requires equipment   Mobility Management cane and RW   Dressing/Bathing Difficulty no   Home Layout Able to live on 1st floor   Equipment Currently Used at Home cane, straight;walker, rolling;oxygen   Readmission within 30 days? No   Patient currently being followed by outpatient case management? No   Do you currently have service(s) that help you manage your care at home? Yes   How Many hours does patient receive services 3   Name and Contact number of agency [facility]---[phone]   Is the pt/caregiver preference to resume services with current agency Yes   Do you take prescription medications? Yes   Do you have prescription coverage? Yes   Coverage [employer]   Who is going to help you get home at discharge? uber or a lift   How do you get to doctors appointments? family or friend will provide;car, drives self   Are you on dialysis? No   Discharge Plan A Home Health   Discharge Plan B Home Health   DME Needed Upon Discharge  wheelchair;other (see comments)  (pt would like a WC for home use)   Discharge  Plan discussed with: Patient   Transition of Care Barriers None   Housing Stability   In the last 12 months, was there a time when you were not able to pay the mortgage or rent on time? N   Transportation Needs   In the past 12 months, has lack of transportation kept you from medical appointments or from getting medications? yes   Food Insecurity   Within the past 12 months, you worried that your food would run out before you got the money to buy more. Never true   OTHER   Name(s) of People in Home Lindsay mireles     Pt's PCP is Dr Sarabjit Arreaga. Pt's  is his dgtr, Lindsay ([phone]). Pt is current with [facility]. Pt uses [facility] pharmacy in [place]. Pt does drive.   Pt would like a WC for home use. Faxed a referral to [employer] at [phone]. CM to follow

## 2025-04-11 NOTE — PLAN OF CARE
Community Health Worker introduced self and role to pt. Pt states he is needing transportation assistance and any other assistance he could get at home. Provided pt with resources for food, financial, and transportation assistance. Pt also states he is on disability, provided pt with phone number to apply for Waiver Program with the state.

## 2025-04-11 NOTE — H&P
[facility]                       [address] [address]    PATIENT NAME:       ЮЛИЯ NUNEZ   YOB: 1960  CSN:                [id number]   MRN:                [id number]  ADMIT DATE:         04/10/2025 11:41:00  PHYSICIAN:          Sarabjit Arreaga MD                        HISTORY AND PHYSICAL      HISTORY OF PRESENT ILLNESS:  The patient is a 64-year-old  male   with history of diabetes mellitus, hypertension, COPD, and chronic interstitial   lung disease, presented today with the complaint of shortness of breath on   exertion and progressively getting worse.  The patient at this point was brought   to the emergency room and was seen by the ER physician, who did the preliminary   work and apparently the shortness of breath was very severe and the patient had   to be admitted for further therapy and care.    PAST MEDICAL HISTORY:  Significant for hypertension, diabetes mellitus type 2,   COPD, chronic interstitial lung disease, sinusitis, osteoarthritis, and venous   insufficiency of the lower extremities.    SOCIAL HISTORY:  The patient is single, has 2 children, 1 boy and 1 girl.  He   completed 11th grade at school.  He does not drink.  Eventually, the patient is   a heavy smoker, he stopped smoking now.  No history of IV drug abuse.    FAMILY HISTORY:  Diabetes mellitus, hypertension and cancer are prevalent in the   family.    PAST SURGICAL HISTORY:  The patient had both hand surgery and varicose vein   surgery.    ALLERGIES:  NO KNOWN ALLERGIES.     CURRENT MEDICATIONS:  The patient is taking,  1. Lisinopril/HCTZ 20/25 mg once a day.  2. Zolpidem 10 mg once a day.  3. Omeprazole 40 mg once a day.  4. Restasis 0.5% ophthalmic 1 drop into both eyes twice a day.  5. Potassium 10 mEq once a day.  6. Lasix 20 mg once a day.  7. Proventil to 2 puffs q.4 hours p.r.n.  8. Advair HFA 2 puffs  once a day.  9. Also taking gabapentin.  10. He takes Lasix 20 mg once a day.  11. Neurontin 300 mg 3 times a day.    REVIEW OF SYSTEMS:  CARDIOVASCULAR SYSTEM:  Negative for chest pain.  RESPIRATORY SYSTEM:  Significant shortness of breath, getting worse on exertion.  GASTROINTESTINAL:  No diarrhea or vomiting.  ENDOCRINOLOGIC SYSTEM:  Diabetes mellitus.  No thyroid disorder.  NEUROLOGICAL:  No focal neurological deficits.    PHYSICAL EXAMINATION:  GENERAL:  At the time the patient was seen, he was alert, oriented, in severe   shortness of breath.  EYES:  Both eyes, pupils reactive to light and accommodation, seems to be   satisfactory.  EARS, NOSE AND THROAT:  No significant pathology.  NECK:  Supple.  No JVD or adenopathy.  HEART:  The patient has S1, S2.  No murmur or gallop.  CHEST:  Appeared to have rhonchi mostly in the left posterior aspect of the lung   and increased expiratory phase.  ABDOMEN:  Soft, nontender.  Good bowel sounds.  EXTREMITIES:  Extremity superior, good range of motion.  Extremity inferior,   trace edema mostly in both knee; however, no clubbing or cyanosis of the lower   extremity.  NEUROLOGICAL:  No focal neurological deficit.  Cranial nerves 2 through 12   intact.    LABORATORY DATA:  Hemoglobin 10.5, hematocrit 32.8, BUN 23.4, creatinine 0.91.    .  Chest x-ray, no acute changes and chronic interstitial disease.    IMPRESSION:    1. The patient has acute on chronic respiratory failure.  2. Chronic interstitial lung disease.  3. Chronic obstructive pulmonary disease.  4. Diabetes mellitus type 2.  5. Osteoarthritis.  6. Anemia of chronic disease.  7. History of colitis.  8. Venous insufficiency of the lower extremity.  9. Gastroesophageal reflux disease.  10. Hypertension.    PLAN:  The patient will be placed on oxygen, of course the patient is oxygen   dependent at home.  He was started on some antibiotics from the emergency room.    Again, I will consult Pulmonology to see the patient  again while in the   hospital.        ______________________________  MD ISSAC Villatoro/RASTA  DD:  04/10/2025  Time:  09:49PM  DT:  04/11/2025  Time:  12:24AM  Job #:  [id number]      HISTORY AND PHYSICAL

## 2025-04-11 NOTE — CONSULTS
[facility] [address]  Wound Care    Patient Name:  Vic Meng   MRN:  [id number]  Date: 4/11/2025  Diagnosis: Acute on chronic hypoxic respiratory failure    History:     Past Medical History:   Diagnosis Date    ADELA positive     Antisynthetase syndrome     Crohn's colitis     Diabetes mellitus     GERD (gastroesophageal reflux disease)     Herpes zoster     Hypertension     ILD (interstitial lung disease)     Lymphadenopathy     Morbid obesity     Osteoarthritis of right knee     Rheumatoid arthritis     Snoring     Supraclavicular mass     Thyroid nodule     Venous insufficiency of lower extremity        Social History[1]    Precautions:     Allergies as of 04/10/2025    (No Known Allergies)       [facility] Assessment Details/Treatment   Wound care consult received for nail trimming. Patient states he does not see a podiatrist and lives alone, does not have the ability to file or trim nails. Luis trimmed to a modest length without injury.       Left foot     Right foot    Right foot post nail trim    Left foot post nail trim    Recommendations made to primary team to re consult if needed.    04/11/2025         [1]   Social History  Socioeconomic History    Marital status: Single   Tobacco Use    Smoking status: Former     Current packs/day: 0.25     Types: Cigarettes    Smokeless tobacco: Never    Tobacco comments:     Only about 2 cigarettes a day    Substance and Sexual Activity    Alcohol use: Not Currently     Comment: One beer a month    Drug use: Never     Comment: Occasionally     Social Drivers of Health     Financial Resource Strain: Low Risk  (4/11/2025)    Overall Financial Resource Strain (CARDIA)     Difficulty of Paying Living Expenses: Not hard at all   Recent Concern: Financial Resource Strain - Medium Risk (1/21/2025)    Overall Financial Resource Strain (CARDIA)     Difficulty of Paying Living Expenses: Somewhat hard   Food Insecurity: No Food Insecurity (4/11/2025)     Hunger Vital Sign     Worried About Running Out of Food in the Last Year: Never true     Ran Out of Food in the Last Year: Never true   Transportation Needs: Unmet Transportation Needs (4/11/2025)    PRAPARE - Transportation     Lack of Transportation (Medical): Yes     Lack of Transportation (Non-Medical): No   Physical Activity: Inactive (3/10/2025)    Exercise Vital Sign     Days of Exercise per Week: 0 days     Minutes of Exercise per Session: 0 min   Stress: No Stress Concern Present (4/11/2025)    [employer] [place] [employer] - Occupational Stress Questionnaire     Feeling of Stress : Not at all   Recent Concern: Stress - Stress Concern Present (3/15/2025)    [employer] [place] [employer] - Occupational Stress Questionnaire     Feeling of Stress : Very much   Housing Stability: Low Risk  (4/11/2025)    Housing Stability Vital Sign     Unable to Pay for Housing in the Last Year: No     Homeless in the Last Year: No

## 2025-04-11 NOTE — CONSULTS
I think I have seen this gentleman in the office, exactly how long ago I do not recall but any to pull out his chart when I get to the office.  When I saw him and looked at the CAT scan I remembered the intense interstitial prominent findings in the setting of rheumatologic background and that he is following up with Rheumatology or I referred him to them again I need to check my notes.  He is a poor historian but he was still smoking until 3 months ago and he is in an acute bronchospastic episode not sure if this is COPD alone or a flare up of his underlying connective tissue process.  ABGs obtained yesterday are not too bad actually but he needs to be on a an acute exacerbations type of regimen with steroids and empirical but again until we figure out the process this brought him back over here.  Solu-Medrol, Rocephin and Zithromax are added just now.

## 2025-04-11 NOTE — CARE UPDATE
[id number] Called [facility] and spoke with Ana who reports they received the referral. If for some reason they do not have his size in stock when pt is dc, they can ship it to him

## 2025-04-12 LAB
GLUCOSE SERPL-MCNC: 168 MG/DL (ref 70–110)
POCT GLUCOSE: 168 MG/DL (ref 70–110)
POCT GLUCOSE: 174 MG/DL (ref 70–110)
POCT GLUCOSE: 184 MG/DL (ref 70–110)
POCT GLUCOSE: 224 MG/DL (ref 70–110)

## 2025-04-12 PROCEDURE — 25000003 PHARM REV CODE 250: Performed by: INTERNAL MEDICINE

## 2025-04-12 PROCEDURE — 63600175 PHARM REV CODE 636 W HCPCS: Mod: JZ,TB | Performed by: INTERNAL MEDICINE

## 2025-04-12 PROCEDURE — 25000242 PHARM REV CODE 250 ALT 637 W/ HCPCS: Performed by: INTERNAL MEDICINE

## 2025-04-12 PROCEDURE — 63600175 PHARM REV CODE 636 W HCPCS: Performed by: INTERNAL MEDICINE

## 2025-04-12 PROCEDURE — 21400001 HC TELEMETRY ROOM

## 2025-04-12 RX ADMIN — LACTULOSE 20 G: 10 SOLUTION ORAL at 09:04

## 2025-04-12 RX ADMIN — METHYLPREDNISOLONE SODIUM SUCCINATE 60 MG: 40 INJECTION, POWDER, FOR SOLUTION INTRAMUSCULAR; INTRAVENOUS at 12:04

## 2025-04-12 RX ADMIN — POTASSIUM CHLORIDE 20 MEQ: 1500 TABLET, EXTENDED RELEASE ORAL at 09:04

## 2025-04-12 RX ADMIN — GABAPENTIN 300 MG: 300 CAPSULE ORAL at 09:04

## 2025-04-12 RX ADMIN — GABAPENTIN 300 MG: 300 CAPSULE ORAL at 03:04

## 2025-04-12 RX ADMIN — FLUTICASONE PROPIONATE 50 MCG: 50 SPRAY, METERED NASAL at 09:04

## 2025-04-12 RX ADMIN — BENZONATATE 100 MG: 100 CAPSULE ORAL at 12:04

## 2025-04-12 RX ADMIN — PANTOPRAZOLE SODIUM 40 MG: 40 TABLET, DELAYED RELEASE ORAL at 09:04

## 2025-04-12 RX ADMIN — BUDESONIDE 3 MG: 3 CAPSULE, COATED PELLETS ORAL at 09:04

## 2025-04-12 RX ADMIN — HYPROMELLOSE 2910 2 DROP: 5 SOLUTION/ DROPS OPHTHALMIC at 03:04

## 2025-04-12 RX ADMIN — FLUTICASONE PROPIONATE 50 MCG: 50 SPRAY, METERED NASAL at 10:04

## 2025-04-12 RX ADMIN — CEFTRIAXONE SODIUM 1 G: 1 INJECTION, POWDER, FOR SOLUTION INTRAMUSCULAR; INTRAVENOUS at 09:04

## 2025-04-12 RX ADMIN — OXYCODONE HYDROCHLORIDE 5 MG: 5 TABLET ORAL at 10:04

## 2025-04-12 RX ADMIN — HYPROMELLOSE 2910 2 DROP: 5 SOLUTION/ DROPS OPHTHALMIC at 10:04

## 2025-04-12 RX ADMIN — AZITHROMYCIN MONOHYDRATE 500 MG: 500 INJECTION, POWDER, LYOPHILIZED, FOR SOLUTION INTRAVENOUS at 09:04

## 2025-04-12 RX ADMIN — HYDROCHLOROTHIAZIDE 25 MG: 25 TABLET ORAL at 09:04

## 2025-04-12 RX ADMIN — METHYLPREDNISOLONE SODIUM SUCCINATE 60 MG: 40 INJECTION, POWDER, FOR SOLUTION INTRAMUSCULAR; INTRAVENOUS at 06:04

## 2025-04-12 RX ADMIN — GABAPENTIN 300 MG: 300 CAPSULE ORAL at 10:04

## 2025-04-12 RX ADMIN — BUDESONIDE 3 MG: 3 CAPSULE, COATED PELLETS ORAL at 10:04

## 2025-04-12 RX ADMIN — HYPROMELLOSE 2910 2 DROP: 5 SOLUTION/ DROPS OPHTHALMIC at 09:04

## 2025-04-12 RX ADMIN — FLUTICASONE FUROATE AND VILANTEROL TRIFENATATE 1 PUFF: 100; 25 POWDER RESPIRATORY (INHALATION) at 09:04

## 2025-04-12 RX ADMIN — ACETAMINOPHEN 650 MG: 325 TABLET, FILM COATED ORAL at 10:04

## 2025-04-12 RX ADMIN — LISINOPRIL 20 MG: 20 TABLET ORAL at 09:04

## 2025-04-12 RX ADMIN — HYPROMELLOSE 2910 2 DROP: 5 SOLUTION/ DROPS OPHTHALMIC at 06:04

## 2025-04-12 RX ADMIN — ENOXAPARIN SODIUM 40 MG: 40 INJECTION SUBCUTANEOUS at 06:04

## 2025-04-12 RX ADMIN — ASPIRIN 81 MG: 81 TABLET, COATED ORAL at 09:04

## 2025-04-12 RX ADMIN — THERA TABS 1 TABLET: TAB at 09:04

## 2025-04-12 RX ADMIN — CETIRIZINE HYDROCHLORIDE 10 MG: 10 TABLET, FILM COATED ORAL at 09:04

## 2025-04-12 NOTE — PROGRESS NOTES
[facility]                       [address] [address]    PATIENT NAME:       ЮИЛЯ NUNEZ   YOB: 1960  CSN:                [id number]   MRN:                [id number]  ADMIT DATE:         04/10/2025 11:41:00  PHYSICIAN:          Sarabjit Arreaga MD                            PROGRESS NOTE    DATE:  04/12/2025 00:00:00    SUBJECTIVE:  The patient is a 64-year-old  male, who came to   this hospital because of severe shortness of breath and was basically in   respiratory failure.  However, patient has been attended by the ER and then   admitted.  Also the patient was placed on antibiotics.  I did consult   pulmonologist to see the patient and the heavy regimen of Solu-Medrol and also   antibiotics.  At this point, the patient is doing okay, but is still in bed, not   moving at all.  Because when patient started he was very short of   breath.  I will definitely order physical therapy to start working with   that patient.    OBJECTIVE:  Today examination,  HEART:  The patient has S1, S2.  No murmur or gallop.  CHEST:  Still a few rhonchi.  ABDOMEN:  Soft, nontender.  Good bowel sounds.  EXTREMITIES:  Extremity superior, good range of motion.  Extremity inferior,   knee pain.    IMPRESSION:    1. Exacerbation of chronic interstitial lung disease.  2. Chronic obstructive pulmonary disease.    3. Hypertension.  4. Osteoarthritis.    5. Diabetes mellitus.  6. Gastroesophageal reflux.  7. History of Crohn's disease.    PLAN:  We are going to continue present therapy and order PT to work with the   patient.        ______________________________  Sarabjit Arreaag MD    CHL/AQS  DD:  04/12/2025  Time:  02:36PM  DT:  04/12/2025  Time:  03:38PM  Job #:  [id number]      PROGRESS NOTE

## 2025-04-13 LAB
POCT GLUCOSE: 177 MG/DL (ref 70–110)
POCT GLUCOSE: 214 MG/DL (ref 70–110)
POCT GLUCOSE: 268 MG/DL (ref 70–110)

## 2025-04-13 PROCEDURE — 25000003 PHARM REV CODE 250: Performed by: INTERNAL MEDICINE

## 2025-04-13 PROCEDURE — 63600175 PHARM REV CODE 636 W HCPCS: Performed by: INTERNAL MEDICINE

## 2025-04-13 PROCEDURE — 21400001 HC TELEMETRY ROOM

## 2025-04-13 PROCEDURE — 25000242 PHARM REV CODE 250 ALT 637 W/ HCPCS: Performed by: INTERNAL MEDICINE

## 2025-04-13 PROCEDURE — 97162 PT EVAL MOD COMPLEX 30 MIN: CPT

## 2025-04-13 RX ADMIN — HYPROMELLOSE 2910 2 DROP: 5 SOLUTION/ DROPS OPHTHALMIC at 08:04

## 2025-04-13 RX ADMIN — GABAPENTIN 300 MG: 300 CAPSULE ORAL at 09:04

## 2025-04-13 RX ADMIN — METHYLPREDNISOLONE SODIUM SUCCINATE 60 MG: 40 INJECTION, POWDER, FOR SOLUTION INTRAMUSCULAR; INTRAVENOUS at 11:04

## 2025-04-13 RX ADMIN — GABAPENTIN 300 MG: 300 CAPSULE ORAL at 02:04

## 2025-04-13 RX ADMIN — BUDESONIDE 3 MG: 3 CAPSULE, COATED PELLETS ORAL at 09:04

## 2025-04-13 RX ADMIN — CEFTRIAXONE SODIUM 1 G: 1 INJECTION, POWDER, FOR SOLUTION INTRAMUSCULAR; INTRAVENOUS at 09:04

## 2025-04-13 RX ADMIN — HYPROMELLOSE 2910 2 DROP: 5 SOLUTION/ DROPS OPHTHALMIC at 09:04

## 2025-04-13 RX ADMIN — INSULIN ASPART 4 UNITS: 100 INJECTION, SOLUTION INTRAVENOUS; SUBCUTANEOUS at 06:04

## 2025-04-13 RX ADMIN — PANTOPRAZOLE SODIUM 40 MG: 40 TABLET, DELAYED RELEASE ORAL at 09:04

## 2025-04-13 RX ADMIN — HYPROMELLOSE 2910 2 DROP: 5 SOLUTION/ DROPS OPHTHALMIC at 06:04

## 2025-04-13 RX ADMIN — HYPROMELLOSE 2910 2 DROP: 5 SOLUTION/ DROPS OPHTHALMIC at 05:04

## 2025-04-13 RX ADMIN — METHYLPREDNISOLONE SODIUM SUCCINATE 60 MG: 40 INJECTION, POWDER, FOR SOLUTION INTRAMUSCULAR; INTRAVENOUS at 06:04

## 2025-04-13 RX ADMIN — OXYCODONE HYDROCHLORIDE 5 MG: 5 TABLET ORAL at 06:04

## 2025-04-13 RX ADMIN — LACTULOSE 20 G: 10 SOLUTION ORAL at 09:04

## 2025-04-13 RX ADMIN — LISINOPRIL 20 MG: 20 TABLET ORAL at 09:04

## 2025-04-13 RX ADMIN — ZOLPIDEM TARTRATE 10 MG: 5 TABLET, FILM COATED ORAL at 08:04

## 2025-04-13 RX ADMIN — BUDESONIDE 3 MG: 3 CAPSULE, COATED PELLETS ORAL at 08:04

## 2025-04-13 RX ADMIN — FLUTICASONE FUROATE AND VILANTEROL TRIFENATATE 1 PUFF: 100; 25 POWDER RESPIRATORY (INHALATION) at 09:04

## 2025-04-13 RX ADMIN — AZITHROMYCIN MONOHYDRATE 500 MG: 500 INJECTION, POWDER, LYOPHILIZED, FOR SOLUTION INTRAVENOUS at 09:04

## 2025-04-13 RX ADMIN — HYPROMELLOSE 2910 2 DROP: 5 SOLUTION/ DROPS OPHTHALMIC at 02:04

## 2025-04-13 RX ADMIN — METHYLPREDNISOLONE SODIUM SUCCINATE 60 MG: 40 INJECTION, POWDER, FOR SOLUTION INTRAMUSCULAR; INTRAVENOUS at 01:04

## 2025-04-13 RX ADMIN — GABAPENTIN 300 MG: 300 CAPSULE ORAL at 08:04

## 2025-04-13 RX ADMIN — THERA TABS 1 TABLET: TAB at 09:04

## 2025-04-13 RX ADMIN — ASPIRIN 81 MG: 81 TABLET, COATED ORAL at 09:04

## 2025-04-13 RX ADMIN — INSULIN ASPART 3 UNITS: 100 INJECTION, SOLUTION INTRAVENOUS; SUBCUTANEOUS at 08:04

## 2025-04-13 RX ADMIN — HYDROCHLOROTHIAZIDE 25 MG: 25 TABLET ORAL at 09:04

## 2025-04-13 RX ADMIN — CETIRIZINE HYDROCHLORIDE 10 MG: 10 TABLET, FILM COATED ORAL at 09:04

## 2025-04-13 RX ADMIN — FLUTICASONE PROPIONATE 50 MCG: 50 SPRAY, METERED NASAL at 09:04

## 2025-04-13 RX ADMIN — FLUTICASONE PROPIONATE 50 MCG: 50 SPRAY, METERED NASAL at 08:04

## 2025-04-13 RX ADMIN — OXYCODONE HYDROCHLORIDE 5 MG: 5 TABLET ORAL at 11:04

## 2025-04-13 RX ADMIN — ENOXAPARIN SODIUM 40 MG: 40 INJECTION SUBCUTANEOUS at 06:04

## 2025-04-13 RX ADMIN — METHYLPREDNISOLONE SODIUM SUCCINATE 60 MG: 40 INJECTION, POWDER, FOR SOLUTION INTRAMUSCULAR; INTRAVENOUS at 05:04

## 2025-04-13 RX ADMIN — POTASSIUM CHLORIDE 20 MEQ: 1500 TABLET, EXTENDED RELEASE ORAL at 09:04

## 2025-04-13 NOTE — PROGRESS NOTES
[facility]                       [address] [address]    PATIENT NAME:       ЮЛИЯ NUNEZ   YOB: 1960  CSN:                [id number]   MRN:                [id number]  ADMIT DATE:         04/10/2025 11:41:00  PHYSICIAN:          Sarabjit Arreaga MD                            PROGRESS NOTE    DATE:  04/13/2025 00:00:00    SUBJECTVE:  The patient is a 64-year-old  male, admitted because   of severe shortness of breath and who was diagnosed to have exacerbation of   interstitial lung disease and also the patient appeared to have some yellow   sputum and he was placed on antibiotics and also Solu-Medrol.  Pulmonary had   been seeing the patient.  However, due to the patient's condition, he was   staying in bed all the time.  I would order physical therapy.  We will start to   work with him.    OBJECTIVE:  GENERAL:  Today, he appeared to be alert, oriented, minimal, some   improvement in his shortness of breath.  HEART:  The patient has S1, S2.  No murmur or gallop.  CHEST:  Again, increased expiratory phase and few rhonchi.  ABDOMEN:  Soft, nontender.  Good bowel sounds.  EXTREMITIES:  Extremity superior, good range of motion.  Extremity inferior,   still complaining of knee pain.    IMPRESSION:    1. Exacerbation of chronic interstitial lung disease.    2. Diabetes mellitus type 2.   3. Hypertension.  4. Chronic obstructive pulmonary disease.   5. Osteoarthritis.  6. Gastroesophageal reflux disease.  7. History of Crohn disease.    PLAN:  We will continue present care and physical therapy.  If medically cleared   by the pulmonologist, I will discharge the patient.    ______________________________  Sarabjit Arreaga MD    CHL/AQS  DD:  04/13/2025  Time:  02:02PM  DT:  04/13/2025  Time:  02:20PM  Job #:  [id number]      PROGRESS NOTE

## 2025-04-13 NOTE — PROGRESS NOTES
Found his extensive note from my office and amended it to the current chart, but that is not imminently pertinent, as he has a form of exacerbation of some sort? And responding by exam compared to 48 hrs ago??  On RA sounds pretty clear  Will visit more quietly and compare my office notes and see where he stands now with his rheumatologic issues, and if he is following at [facility] and what immunosuppressive he is on if any??

## 2025-04-13 NOTE — PLAN OF CARE
Problem: Physical Therapy  Goal: Physical Therapy Goal  Description: Goals to be met by: 25     Patient will increase functional independence with mobility by performin. Supine to sit with [place]  2. Sit to stand transfer with [place]  3. Gait  x 200 feet with Modified [place] using Rolling Walker or QC.     Outcome: Progressing

## 2025-04-13 NOTE — PT/OT/SLP EVAL
Physical Therapy Evaluation    Patient Name:  Vic Meng   MRN:  [id number]    Recommendations:     Discharge therapy intensity: Low Intensity Therapy   Discharge Equipment Recommendations: none   Barriers to discharge: Decreased caregiver support, Impaired mobility, and Ongoing medical needs    Assessment:     Vic Meng is a 64 y.o. male admitted with a medical diagnosis of   Acute exacerbation of chronic interstitial lung disease, HTN, DM2, COPD.  He presents with the following impairments/functional limitations: impaired endurance, impaired functional mobility, impaired cardiopulmonary response to activity.    Pt pleasant and agreeable to participate with PT today. C/o cough and SOB. At baseline he is on 3L O2 and ambulatory w/ QC. Temporarily living w/ dtr however she works and is unable to assist pt who states he is needing more help these days. Pt mobilizing well however is limited by SOB and decreasing O2 sats w/ activity. Today O2 needing to be increased to 6L during ambulation 2/2 sats at 75% lowest and not increasing w/ seated rest. At end of eval room O2 increased to 4L 2/2 sats in low 90's on 3L.     Rehab Prognosis: Good; patient would benefit from acute skilled PT services to address these deficits and reach maximum level of function.    Recent Surgery: * No surgery found *      Plan:     During this hospitalization, patient would benefit from acute PT services 5 x/week to address the identified rehab impairments via gait training, therapeutic activities, therapeutic exercises, neuromuscular re-education and progress toward the following goals:    Plan of Care Expires:  05/13/25    Subjective     Chief Complaint: cough, SOB  Patient/Family Comments/goals: return to PLOF  Pain/Comfort:  Pain Rating 1: 0/10    Patients cultural, spiritual, Shinto conflicts given the current situation:      Living Environment:  Temporarily living w/ dtr  Prior to admission, patients level of function was mod I w/  QC, ind w/ ADLs, assist IADLs.  Equipment used at home: cane, quad, walker, rolling.  DME owned (not currently used): none.  Upon discharge, patient will have assistance from unknown.    Objective:     Communicated with RN prior to session.  Patient found HOB elevated with oxygen, pulse ox (continuous), telemetry  upon PT entry to room.    General Precautions: Standard, fall  Orthopedic Precautions:N/A   Braces: N/A  Respiratory Status: Nasal cannula, flow 3 L/min  Blood Pressure: NT      Exams:  RLE ROM: WFL  RLE Strength: WFL  LLE ROM: WFL  LLE Strength: WFL  Skin integrity: Visible skin intact      Functional Mobility:  Bed Mobility:     Supine to Sit: stand by assistance  Transfers:     Sit to Stand:  stand by assistance with rolling walker  Gait: ~75 ft w/ RW, CGA/SBA, on 3L O2 initially then increased to 6L during seated rest break      AM-PAC 6 CLICK MOBILITY  Total Score:22       Treatment & Education:  Education Provided:  Role and goals of PT, transfer training, bed mobility, gait training, balance training, safety awareness, assistive device, strengthening exercises, and importance of participating in PT to return to PLOF.        Patient left up in chair with all lines intact and call button in reach.    GOALS:   Multidisciplinary Problems       Physical Therapy Goals          Problem: Physical Therapy    Goal Priority Disciplines Outcome Interventions   Physical Therapy Goal     PT, PT/OT Progressing    Description: Goals to be met by: 25     Patient will increase functional independence with mobility by performin. Supine to sit with [place]  2. Sit to stand transfer with [place]  3. Gait  x 200 feet with Modified [place] using Rolling Walker or QC.                          History:     Past Medical History:   Diagnosis Date    ADELA positive     Antisynthetase syndrome     Crohn's colitis     Diabetes mellitus     GERD (gastroesophageal reflux disease)     Herpes zoster      Hypertension     ILD (interstitial lung disease)     Lymphadenopathy     Morbid obesity     Osteoarthritis of right knee     Rheumatoid arthritis     Snoring     Supraclavicular mass     Thyroid nodule     Venous insufficiency of lower extremity        Past Surgical History:   Procedure Laterality Date    Drainage of right thyroid gland lobe  10/07/2021    Laser surgery left leg Left     Right hand surgery         Time Tracking:     PT Received On: 04/13/25  PT Start Time: 0840     PT Stop Time: 0858  PT Total Time (min): 18 min     Billable Minutes: Evaluation 18      04/13/2025

## 2025-04-14 LAB
ANION GAP SERPL CALC-SCNC: 7 MEQ/L
BASOPHILS # BLD AUTO: 0.02 X10(3)/MCL
BASOPHILS NFR BLD AUTO: 0.1 %
BUN SERPL-MCNC: 29.9 MG/DL (ref 8.4–25.7)
CALCIUM SERPL-MCNC: 7.9 MG/DL (ref 8.8–10)
CHLORIDE SERPL-SCNC: 98 MMOL/L (ref 98–107)
CO2 SERPL-SCNC: 28 MMOL/L (ref 23–31)
CREAT SERPL-MCNC: 0.98 MG/DL (ref 0.72–1.25)
CREAT/UREA NIT SERPL: 31
EOSINOPHIL # BLD AUTO: 0 X10(3)/MCL (ref 0–0.9)
EOSINOPHIL NFR BLD AUTO: 0 %
ERYTHROCYTE [DISTWIDTH] IN BLOOD BY AUTOMATED COUNT: 15.5 % (ref 11.5–17)
GFR SERPLBLD CREATININE-BSD FMLA CKD-EPI: >60 ML/MIN/1.73/M2
GLUCOSE SERPL-MCNC: 243 MG/DL (ref 82–115)
HCT VFR BLD AUTO: 36.5 % (ref 42–52)
HGB BLD-MCNC: 11.3 G/DL (ref 14–18)
IMM GRANULOCYTES # BLD AUTO: 0.2 X10(3)/MCL (ref 0–0.04)
IMM GRANULOCYTES NFR BLD AUTO: 1.2 %
LYMPHOCYTES # BLD AUTO: 0.83 X10(3)/MCL (ref 0.6–4.6)
LYMPHOCYTES NFR BLD AUTO: 5.2 %
MCH RBC QN AUTO: 26.3 PG (ref 27–31)
MCHC RBC AUTO-ENTMCNC: 31 G/DL (ref 33–36)
MCV RBC AUTO: 84.9 FL (ref 80–94)
MONOCYTES # BLD AUTO: 0.66 X10(3)/MCL (ref 0.1–1.3)
MONOCYTES NFR BLD AUTO: 4.1 %
NEUTROPHILS # BLD AUTO: 14.36 X10(3)/MCL (ref 2.1–9.2)
NEUTROPHILS NFR BLD AUTO: 89.4 %
NRBC BLD AUTO-RTO: 0 %
PLATELET # BLD AUTO: 291 X10(3)/MCL (ref 130–400)
PMV BLD AUTO: 10.3 FL (ref 7.4–10.4)
POCT GLUCOSE: 188 MG/DL (ref 70–110)
POCT GLUCOSE: 220 MG/DL (ref 70–110)
POCT GLUCOSE: 264 MG/DL (ref 70–110)
POCT GLUCOSE: 268 MG/DL (ref 70–110)
POTASSIUM SERPL-SCNC: 5.1 MMOL/L (ref 3.5–5.1)
RBC # BLD AUTO: 4.3 X10(6)/MCL (ref 4.7–6.1)
SODIUM SERPL-SCNC: 133 MMOL/L (ref 136–145)
WBC # BLD AUTO: 16.07 X10(3)/MCL (ref 4.5–11.5)

## 2025-04-14 PROCEDURE — 97535 SELF CARE MNGMENT TRAINING: CPT

## 2025-04-14 PROCEDURE — 25000242 PHARM REV CODE 250 ALT 637 W/ HCPCS: Performed by: INTERNAL MEDICINE

## 2025-04-14 PROCEDURE — 63600175 PHARM REV CODE 636 W HCPCS: Performed by: INTERNAL MEDICINE

## 2025-04-14 PROCEDURE — 21400001 HC TELEMETRY ROOM

## 2025-04-14 PROCEDURE — 97530 THERAPEUTIC ACTIVITIES: CPT | Mod: CQ

## 2025-04-14 PROCEDURE — 25000003 PHARM REV CODE 250: Performed by: INTERNAL MEDICINE

## 2025-04-14 PROCEDURE — 97166 OT EVAL MOD COMPLEX 45 MIN: CPT

## 2025-04-14 PROCEDURE — 85025 COMPLETE CBC W/AUTO DIFF WBC: CPT | Performed by: INTERNAL MEDICINE

## 2025-04-14 PROCEDURE — 97116 GAIT TRAINING THERAPY: CPT | Mod: CQ

## 2025-04-14 PROCEDURE — 80048 BASIC METABOLIC PNL TOTAL CA: CPT | Performed by: INTERNAL MEDICINE

## 2025-04-14 PROCEDURE — 36415 COLL VENOUS BLD VENIPUNCTURE: CPT | Performed by: INTERNAL MEDICINE

## 2025-04-14 RX ORDER — HYDRALAZINE HYDROCHLORIDE 20 MG/ML
10 INJECTION INTRAMUSCULAR; INTRAVENOUS EVERY 6 HOURS PRN
Status: DISCONTINUED | OUTPATIENT
Start: 2025-04-14 | End: 2025-04-17 | Stop reason: HOSPADM

## 2025-04-14 RX ORDER — HYDRALAZINE HYDROCHLORIDE 20 MG/ML
10 INJECTION INTRAMUSCULAR; INTRAVENOUS ONCE
Status: COMPLETED | OUTPATIENT
Start: 2025-04-14 | End: 2025-04-14

## 2025-04-14 RX ORDER — METHYLPREDNISOLONE SOD SUCC 125 MG
40 VIAL (EA) INJECTION
Status: DISCONTINUED | OUTPATIENT
Start: 2025-04-14 | End: 2025-04-15

## 2025-04-14 RX ADMIN — CETIRIZINE HYDROCHLORIDE 10 MG: 10 TABLET, FILM COATED ORAL at 09:04

## 2025-04-14 RX ADMIN — ZOLPIDEM TARTRATE 10 MG: 5 TABLET, FILM COATED ORAL at 08:04

## 2025-04-14 RX ADMIN — OXYCODONE HYDROCHLORIDE 5 MG: 5 TABLET ORAL at 10:04

## 2025-04-14 RX ADMIN — GABAPENTIN 300 MG: 300 CAPSULE ORAL at 09:04

## 2025-04-14 RX ADMIN — LISINOPRIL 20 MG: 20 TABLET ORAL at 09:04

## 2025-04-14 RX ADMIN — HYPROMELLOSE 2910 2 DROP: 5 SOLUTION/ DROPS OPHTHALMIC at 05:04

## 2025-04-14 RX ADMIN — HYDRALAZINE HYDROCHLORIDE 10 MG: 20 INJECTION INTRAMUSCULAR; INTRAVENOUS at 01:04

## 2025-04-14 RX ADMIN — BUDESONIDE 3 MG: 3 CAPSULE, COATED PELLETS ORAL at 10:04

## 2025-04-14 RX ADMIN — METHYLPREDNISOLONE SODIUM SUCCINATE 60 MG: 40 INJECTION, POWDER, FOR SOLUTION INTRAMUSCULAR; INTRAVENOUS at 05:04

## 2025-04-14 RX ADMIN — ASPIRIN 81 MG: 81 TABLET, COATED ORAL at 09:04

## 2025-04-14 RX ADMIN — HYPROMELLOSE 2910 2 DROP: 5 SOLUTION/ DROPS OPHTHALMIC at 09:04

## 2025-04-14 RX ADMIN — GABAPENTIN 300 MG: 300 CAPSULE ORAL at 08:04

## 2025-04-14 RX ADMIN — FLUTICASONE FUROATE AND VILANTEROL TRIFENATATE 1 PUFF: 100; 25 POWDER RESPIRATORY (INHALATION) at 09:04

## 2025-04-14 RX ADMIN — INSULIN ASPART 6 UNITS: 100 INJECTION, SOLUTION INTRAVENOUS; SUBCUTANEOUS at 05:04

## 2025-04-14 RX ADMIN — FLUTICASONE PROPIONATE 50 MCG: 50 SPRAY, METERED NASAL at 09:04

## 2025-04-14 RX ADMIN — GABAPENTIN 300 MG: 300 CAPSULE ORAL at 04:04

## 2025-04-14 RX ADMIN — LACTULOSE 20 G: 10 SOLUTION ORAL at 09:04

## 2025-04-14 RX ADMIN — AZITHROMYCIN MONOHYDRATE 500 MG: 500 INJECTION, POWDER, LYOPHILIZED, FOR SOLUTION INTRAVENOUS at 10:04

## 2025-04-14 RX ADMIN — INSULIN ASPART 2 UNITS: 100 INJECTION, SOLUTION INTRAVENOUS; SUBCUTANEOUS at 08:04

## 2025-04-14 RX ADMIN — OXYCODONE HYDROCHLORIDE 5 MG: 5 TABLET ORAL at 06:04

## 2025-04-14 RX ADMIN — INSULIN ASPART 2 UNITS: 100 INJECTION, SOLUTION INTRAVENOUS; SUBCUTANEOUS at 12:04

## 2025-04-14 RX ADMIN — METHYLPREDNISOLONE SODIUM SUCCINATE 60 MG: 40 INJECTION, POWDER, FOR SOLUTION INTRAMUSCULAR; INTRAVENOUS at 12:04

## 2025-04-14 RX ADMIN — METHYLPREDNISOLONE SODIUM SUCCINATE 40 MG: 125 INJECTION, POWDER, FOR SOLUTION INTRAMUSCULAR; INTRAVENOUS at 08:04

## 2025-04-14 RX ADMIN — FLUTICASONE PROPIONATE 50 MCG: 50 SPRAY, METERED NASAL at 08:04

## 2025-04-14 RX ADMIN — POTASSIUM CHLORIDE 20 MEQ: 1500 TABLET, EXTENDED RELEASE ORAL at 09:04

## 2025-04-14 RX ADMIN — HYDROCHLOROTHIAZIDE 25 MG: 25 TABLET ORAL at 09:04

## 2025-04-14 RX ADMIN — THERA TABS 1 TABLET: TAB at 09:04

## 2025-04-14 RX ADMIN — PANTOPRAZOLE SODIUM 40 MG: 40 TABLET, DELAYED RELEASE ORAL at 09:04

## 2025-04-14 RX ADMIN — ENOXAPARIN SODIUM 40 MG: 40 INJECTION SUBCUTANEOUS at 04:04

## 2025-04-14 RX ADMIN — CEFTRIAXONE SODIUM 1 G: 1 INJECTION, POWDER, FOR SOLUTION INTRAMUSCULAR; INTRAVENOUS at 09:04

## 2025-04-14 RX ADMIN — BUDESONIDE 3 MG: 3 CAPSULE, COATED PELLETS ORAL at 08:04

## 2025-04-14 NOTE — PT/OT/SLP PROGRESS
Physical Therapy Treatment    Patient Name:  Vic Meng   MRN:  [id number]    Recommendations:     Discharge therapy intensity: Low Intensity Therapy   Discharge Equipment Recommendations: none  Barriers to discharge: Ongoing medical needs    Assessment:     Vic Meng is a 64 y.o. male admitted with a medical diagnosis of Acute exacerbation of chronic interstitial lung disease, HTN, DM2, COPD .  He presents with the following impairments/functional limitations: impaired endurance, impaired functional mobility, impaired cardiopulmonary response to activity.    Rehab Prognosis: Good; patient would benefit from acute skilled PT services to address these deficits and reach maximum level of function.    Recent Surgery: * No surgery found *      Plan:     During this hospitalization, patient would benefit from acute PT services 5 x/week to address the identified rehab impairments via gait training, therapeutic activities, therapeutic exercises, neuromuscular re-education and progress toward the following goals:    Plan of Care Expires:  05/13/25    Subjective     Chief Complaint: none stated  Patient/Family Comments/goals: none  stated  Pain/Comfort:  Pain Rating 1: 0/10      Objective:     Communicated with nurse prior to session.  Patient found HOB elevated with oxygen, pulse ox (continuous), telemetry upon PT entry to room.     General Precautions: Standard, fall  Orthopedic Precautions: N/A  Braces: N/A  Respiratory Status: Nasal cannula, flow 4 L/min  Blood Pressure: nt  Skin Integrity: Visible skin intact      Functional Mobility:  Bed Mobility:     Supine to Sit: contact guard assistance  Transfers:     Sit to Stand:  contact guard assistance with quad cane  Gait: patient amb 20ftx2, 60ftx2 with quad cane with step through gait pattern with CGA. Patient limited by SOB.    Education:  Patient provided with verbal education education regarding PT role/goals/POC, post-op precautions, fall prevention, and safety  awareness.  Understanding was verbalized.     Patient left HOB elevated with all lines intact and call button in reach    GOALS:   Multidisciplinary Problems       Physical Therapy Goals          Problem: Physical Therapy    Goal Priority Disciplines Outcome Interventions   Physical Therapy Goal     PT, PT/OT Progressing    Description: Goals to be met by: 25     Patient will increase functional independence with mobility by performin. Supine to sit with [place]  2. Sit to stand transfer with [place]  3. Gait  x 200 feet with Modified [place] using Rolling Walker or QC.                          Time Tracking:     PT Received On: 25  PT Start Time: 1522     PT Stop Time: 1557  PT Total Time (min): 35 min     Billable Minutes: Gait Training 25 and Therapeutic Activity 10    Treatment Type: Treatment  PT/PTA: PTA     Number of PTA visits since last PT visit: 2025

## 2025-04-14 NOTE — PROGRESS NOTES
[facility]                       [address] [address]    PATIENT NAME:       ЮЛИЯ NUNEZ   YOB: 1960  CSN:                [id number]   MRN:                [id number]  ADMIT DATE:         04/10/2025 11:41:00  PHYSICIAN:          Sarabjit Arreaga MD                            PROGRESS NOTE    DATE:  04/14/2025 00:00:00    SUBJECTIVE:  The patient is a 64-year-old  male with history of   chronic interstitial lung disease, who was admitted because of severe shortness   of breath and was placed on antibiotics and Solu-Medrol therapy.  The patient   has been seen by the pulmonologist at this present time.  I talked to the   pulmonologist.  He is trying to adjust his medication and to see because the   patient is  getting better, but still a little bit short of breath.    PHYSICAL EXAMINATION:  HEART:  S1, S2.  No murmur or gallop.  CHEST:  Some degree of congestion.  ABDOMEN:  Soft, nontender.  Good bowel sounds.  EXTREMITIES:  Extremity superior, good range of motion.  Extremity inferior,   Bilateral knee pain .  No clubbing or cyanosis.    IMPRESSION:    1. Exacerbation of chronic interstitial lung disease.  2. Diabetes mellitus type 2.  3. Hypertension.  4. Chronic obstructive pulmonary disease.  5. Osteoarthritis.  6. History of Crohn disease.  7. Gastroesophageal reflux.    PLAN:  Also, the patient had elevated blood pressure, was 172/100.  I am going   to adjust his medication to get his blood pressure better.      ______________________________  Sarabjit Arreaga MD    CHL/AQS  DD:  04/14/2025  Time:  01:38PM  DT:  04/14/2025  Time:  02:34PM  Job #:  [id number]      PROGRESS NOTE

## 2025-04-14 NOTE — PT/OT/SLP EVAL
Occupational Therapy  Evaluation    Name: Vic Meng  MRN: [id number]    Recommendations:     Discharge therapy intensity: Low Intensity Therapy   Discharge Equipment Recommendations:  none  Barriers to discharge:   (ongoing medical needs)    Assessment:     Vic Meng is a 64 y.o. male with a medical diagnosis of exacerbation of chronic interstitial lung disease.      He presents with the following performance deficits affecting function: weakness, impaired endurance, impaired self care skills, impaired functional mobility, impaired balance, impaired cardiopulmonary response to activity.     Patient is indep with ADLs at baseline with a quad cane. Patient is currently living with his daughter while he repairs his home. Patient appears to be near baseline and would benefit from low intensity therapy upon discharge to increase in functional endurance.    Rehab Prognosis: Good; patient would benefit from acute skilled OT services to address these deficits and reach maximum level of function.       Plan:     Patient to be seen 4 x/week to address the above listed problems via self-care/home management, therapeutic activities, therapeutic exercises, wheelchair management/training  Plan of Care Expires: 05/14/25  Plan of Care Reviewed with: patient    Subjective     Chief Complaint: SOB  Patient/Family Comments/goals: go home    Occupational Profile:  Living Environment: staying with daughter but plans to return home soon  Previous level of function: indep with ADLs  Roles and Routines: none stated  Equipment Used at Home: shower chair, cane, quad  Assistance upon Discharge: daughter as able; she works    Pain/Comfort:  Pain Rating 1: 0/10    Patients cultural, spiritual, Hinduism conflicts given the current situation: no    Objective:     OT communicated with nurse and PTA prior to session.      Patient was found HOB elevated with peripheral IV, telemetry, pulse ox (continuous), oxygen upon OT entry to  room.    General Precautions: Standard, fall  Orthopedic Precautions: N/A  Braces: N/A    Vital Signs: Supplemental 02: 4L; 94% pre-ambulation and 79% post-ambulation requiring rest break to recover    Bed Mobility:    Patient completed Supine to Sit with contact guard assistance  Patient completed Sit to Supine with contact guard assistance    Functional Mobility/Transfers:  Patient completed Sit <> Stand Transfer with contact guard assistance  with  quad cane   Patient completed Toilet Transfer Step Transfer technique with contact guard assistance with  grab bars and quad cane  Functional Mobility: no LOB; fatigues very easily  Patient ambulated into hallway for short distance requiring extended rest break to recover    Activities of Daily Living:  Lower Body Dressing: able to vin/doff slippers; total A to vin socks    Patient would benefit from hip kit to promote indep while conserving energy     [facility] Total Score: 17    Functional Cognition:  Intact    Visual Perceptual Skills:  Intact    Upper Extremity Function:  Right Upper Extremity:   WFL    Left Upper Extremity:  WFL    Balance:   CGA overall    Therapeutic Positioning  Risk for acquired pressure injuries is decreased due to ability to get to BSC/toilet with assist, intact sensation, and continence.    OT interventions performed during the course of today's session:   Education was provided on benefits of and recommendations for therapeutic positioning    Skin assessment: full body skin assessment was performed    Findings: no redness or breakdown noted    OT recommendations for therapeutic positioning throughout hospitalization:   Follow [facility] Pressure Injury Prevention Protocol    Patient Education:  Patient provided with verbal education education regarding OT role/goals/POC, fall prevention, safety awareness, Discharge/DME recommendations, and pressure ulcer prevention.  Understanding was verbalized.     Patient left HOB elevated with all lines  intact, call button in reach, and nurse notified.    GOALS:   Multidisciplinary Problems       Occupational Therapy Goals          Problem: Occupational Therapy    Goal Priority Disciplines Outcome Interventions   Occupational Therapy Goal     OT, PT/OT Progressing    Description: Goals to be met by 5/14/25    Pt will complete grooming standing at sink with LRAD with modified independence.  Pt will complete UB dressing with modified independence.  Pt will complete LB dressing with modified independence using LRAD.  Pt will complete toileting with modified independence using LRAD.  Pt will complete functional mobility to/from toilet and toilet transfer with modified independence using LRAD.                         History:     Past Medical History:   Diagnosis Date    ADELA positive     Antisynthetase syndrome     Crohn's colitis     Diabetes mellitus     GERD (gastroesophageal reflux disease)     Herpes zoster     Hypertension     ILD (interstitial lung disease)     Lymphadenopathy     Morbid obesity     Osteoarthritis of right knee     Rheumatoid arthritis     Snoring     Supraclavicular mass     Thyroid nodule     Venous insufficiency of lower extremity          Past Surgical History:   Procedure Laterality Date    Drainage of right thyroid gland lobe  10/07/2021    Laser surgery left leg Left     Right hand surgery         Time Tracking:     OT Date of Treatment:    OT Start Time: 1524  OT Stop Time: 1555  OT Total Time (min): 31 min    Billable Minutes: Evaluation mod  Self Care/Home Management: 10 min      4/14/2025.

## 2025-04-14 NOTE — PLAN OF CARE
"I called [employer] to see if pt WC approved . He will update me .    Abhishek with [employer] informed me that they will ship 18 " wheelchair when they are available to the pt. I will inform pt of this .   "

## 2025-04-14 NOTE — PLAN OF CARE
Problem: Occupational Therapy  Goal: Occupational Therapy Goal  Description: Goals to be met by 5/14/25    Pt will complete grooming standing at sink with LRAD with modified independence.  Pt will complete UB dressing with modified independence.  Pt will complete LB dressing with modified independence using LRAD.  Pt will complete toileting with modified independence using LRAD.  Pt will complete functional mobility to/from toilet and toilet transfer with modified independence using LRAD.    Outcome: Progressing

## 2025-04-14 NOTE — PROGRESS NOTES
SUBJECTIVE  Improved doing the same as yesterday no new issues    OBJECTIVE  Less congestion and cough    Vitals:    04/13/25 2000 04/13/25 2255 04/14/25 0307 04/14/25 0756   BP:  132/87 129/85 (!) 160/91   Pulse: 80 74 72 82   Resp:   (!) 24 (!) 22   Temp:   97.3 °F (36.3 °C) 97.8 °F (36.6 °C)   TempSrc:   Oral    SpO2:  99% 99% 98%   Weight:       Height:             Intake/Output Summary (Last 24 hours) at 4/14/2025 0850  Last data filed at 4/14/2025 0545  Gross per 24 hour   Intake 840 ml   Output 1875 ml   Net -1035 ml       Review of Systems Review of systems not obtained due to patient factors none new except for some elevated blood sugar from the steroids.      Current Facility-Administered Medications   Medication Frequency    acetaminophen tablet 650 mg Q8H PRN    albuterol inhaler 2 puff Q4H PRN    artificial tears 0.5 % ophthalmic solution 2 drop Q4H While awake    artificial tears 0.5 % ophthalmic solution 2 drop QID PRN    aspirin EC tablet 81 mg Daily    azithromycin (ZITHROMAX) 500 mg in D5W 250 mL  IVPB (admixture device) Q24H    benzonatate capsule 100 mg TID PRN    budesonide capsule 3 mg BID    cefTRIAXone injection 1 g Q24H    cetirizine tablet 10 mg Daily    dextrose 50% injection 12.5 g PRN    dextrose 50% injection 25 g PRN    enoxaparin injection 40 mg Daily    fluticasone furoate-vilanteroL 100-25 mcg/dose diskus inhaler 1 puff Daily    fluticasone propionate 50 mcg/actuation nasal spray 50 mcg BID    gabapentin capsule 300 mg TID    glucagon (human recombinant) injection 1 mg PRN    glucose chewable tablet 16 g PRN    glucose chewable tablet 24 g PRN    lisinopriL tablet 20 mg Daily    And    hydroCHLOROthiazide tablet 25 mg Daily    insulin aspart U-100 injection 0-10 Units QID (AC + HS) PRN    lactulose 10 gram/15 ml solution 20 g Daily    lifitegrast 5 % Dpet 1 drop BID    melatonin tablet 6 mg Nightly PRN    methylPREDNISolone sodium succinate injection 40 mg Q12H    morphine injection  4 mg Q4H PRN    multivitamin tablet Daily    ondansetron injection 4 mg Q8H PRN    oxyCODONE immediate release tablet 5 mg Q4H PRN    pantoprazole EC tablet 40 mg Daily    potassium chloride SA CR tablet 20 mEq Daily    sodium chloride 0.9% flush 10 mL PRN    zolpidem tablet 10 mg Nightly PRN         Physical Exam  Alert conversant in no distress  Hemodynamically stable, on 3 L oxygen 99% saturation  Few scattered rhonchi  No murmur gallop or rubs    Recent Results (from the past 24 hours)   POCT glucose    Collection Time: 04/13/25 11:56 AM   Result Value Ref Range    POCT Glucose 177 (H) 70 - 110 mg/dL   POCT glucose    Collection Time: 04/13/25  6:16 PM   Result Value Ref Range    POCT Glucose 268 (H) 70 - 110 mg/dL   POCT glucose    Collection Time: 04/13/25  8:21 PM   Result Value Ref Range    POCT Glucose 268 (H) 70 - 110 mg/dL   Basic Metabolic Panel    Collection Time: 04/14/25  5:29 AM   Result Value Ref Range    Sodium 133 (L) 136 - 145 mmol/L    Potassium 5.1 3.5 - 5.1 mmol/L    Chloride 98 98 - 107 mmol/L    CO2 28 23 - 31 mmol/L    Glucose 243 (H) 82 - 115 mg/dL    Blood Urea Nitrogen 29.9 (H) 8.4 - 25.7 mg/dL    Creatinine 0.98 0.72 - 1.25 mg/dL    BUN/Creatinine Ratio 31     Calcium 7.9 (L) 8.8 - 10.0 mg/dL    Anion Gap 7.0 mEq/L    eGFR >60 mL/min/1.73/m2   CBC with Differential    Collection Time: 04/14/25  5:29 AM   Result Value Ref Range    WBC 16.07 (H) 4.50 - 11.50 x10(3)/mcL    RBC 4.30 (L) 4.70 - 6.10 x10(6)/mcL    Hgb 11.3 (L) 14.0 - 18.0 g/dL    Hct 36.5 (L) 42.0 - 52.0 %    MCV 84.9 80.0 - 94.0 fL    MCH 26.3 (L) 27.0 - 31.0 pg    MCHC 31.0 (L) 33.0 - 36.0 g/dL    RDW 15.5 11.5 - 17.0 %    Platelet 291 130 - 400 x10(3)/mcL    MPV 10.3 7.4 - 10.4 fL    Neut % 89.4 %    Lymph % 5.2 %    Mono % 4.1 %    Eos % 0.0 %    Basophil % 0.1 %    Imm Grans % 1.2 %    Neut # 14.36 (H) 2.1 - 9.2 x10(3)/mcL    Lymph # 0.83 0.6 - 4.6 x10(3)/mcL    Mono # 0.66 0.1 - 1.3 x10(3)/mcL    Eos # 0.00 0 - 0.9  x10(3)/mcL    Baso # 0.02 <=0.2 x10(3)/mcL    Imm Gran # 0.20 (H) 0.00 - 0.04 x10(3)/mcL    NRBC% 0.0 %   POCT glucose    Collection Time: 04/14/25  5:30 AM   Result Value Ref Range    POCT Glucose 264 (H) 70 - 110 mg/dL     We will repeat a chest x-ray today for comparison although probably he is chronically    IMPRESSION:  Chronic interstitial lung disease  Interstitial lung disease with restrictive physiology on previous spirometry.  Longstanding history of rheumatoid arthritis with antisynthetase syndrome with a an ADELA of 1 in 2560 cytoplasmic speckled pattern.  He apparently has been on chronic Actemra which is not on his home medications.  He had been on Ofev as well but as I recall from his office visit he had to discontinue that for severe GI effect I was supposed to follow-up with Wally in a Gastroenterology.  Other possibilities it is a flare up of COPD per se for which he was covered again with steroid and antibiotics and now it is causing some leukocytosis and hyperglycemia.    PLAN:  Start tapering the steroids recheck a chest x-ray and possibly he can be set for discharge soon.  I still did not get my office nosed in order to amend them to this chart for comparison of where he was when he visited me and where he is taking right now.

## 2025-04-15 LAB
POCT GLUCOSE: 139 MG/DL (ref 70–110)
POCT GLUCOSE: 263 MG/DL (ref 70–110)

## 2025-04-15 PROCEDURE — 25000003 PHARM REV CODE 250: Performed by: INTERNAL MEDICINE

## 2025-04-15 PROCEDURE — 97535 SELF CARE MNGMENT TRAINING: CPT

## 2025-04-15 PROCEDURE — 97530 THERAPEUTIC ACTIVITIES: CPT | Mod: CQ

## 2025-04-15 PROCEDURE — 63600175 PHARM REV CODE 636 W HCPCS: Performed by: INTERNAL MEDICINE

## 2025-04-15 PROCEDURE — 63600175 PHARM REV CODE 636 W HCPCS: Mod: JZ,TB | Performed by: INTERNAL MEDICINE

## 2025-04-15 PROCEDURE — 94760 N-INVAS EAR/PLS OXIMETRY 1: CPT

## 2025-04-15 PROCEDURE — 97116 GAIT TRAINING THERAPY: CPT | Mod: CQ

## 2025-04-15 PROCEDURE — 99900031 HC PATIENT EDUCATION (STAT)

## 2025-04-15 PROCEDURE — 25000242 PHARM REV CODE 250 ALT 637 W/ HCPCS: Performed by: INTERNAL MEDICINE

## 2025-04-15 PROCEDURE — 27000221 HC OXYGEN, UP TO 24 HOURS

## 2025-04-15 PROCEDURE — 21400001 HC TELEMETRY ROOM

## 2025-04-15 RX ORDER — PREDNISONE 20 MG/1
40 TABLET ORAL DAILY
Status: DISCONTINUED | OUTPATIENT
Start: 2025-04-15 | End: 2025-04-17 | Stop reason: HOSPADM

## 2025-04-15 RX ORDER — AZITHROMYCIN 250 MG/1
250 TABLET, FILM COATED ORAL DAILY
Status: DISCONTINUED | OUTPATIENT
Start: 2025-04-16 | End: 2025-04-17 | Stop reason: HOSPADM

## 2025-04-15 RX ADMIN — PANTOPRAZOLE SODIUM 40 MG: 40 TABLET, DELAYED RELEASE ORAL at 08:04

## 2025-04-15 RX ADMIN — AZITHROMYCIN MONOHYDRATE 500 MG: 500 INJECTION, POWDER, LYOPHILIZED, FOR SOLUTION INTRAVENOUS at 09:04

## 2025-04-15 RX ADMIN — GABAPENTIN 300 MG: 300 CAPSULE ORAL at 02:04

## 2025-04-15 RX ADMIN — ASPIRIN 81 MG: 81 TABLET, COATED ORAL at 08:04

## 2025-04-15 RX ADMIN — BUDESONIDE 3 MG: 3 CAPSULE, COATED PELLETS ORAL at 10:04

## 2025-04-15 RX ADMIN — ENOXAPARIN SODIUM 40 MG: 40 INJECTION SUBCUTANEOUS at 06:04

## 2025-04-15 RX ADMIN — OXYCODONE HYDROCHLORIDE 5 MG: 5 TABLET ORAL at 05:04

## 2025-04-15 RX ADMIN — BUDESONIDE 3 MG: 3 CAPSULE, COATED PELLETS ORAL at 08:04

## 2025-04-15 RX ADMIN — FLUTICASONE FUROATE AND VILANTEROL TRIFENATATE 1 PUFF: 100; 25 POWDER RESPIRATORY (INHALATION) at 09:04

## 2025-04-15 RX ADMIN — CETIRIZINE HYDROCHLORIDE 10 MG: 10 TABLET, FILM COATED ORAL at 08:04

## 2025-04-15 RX ADMIN — OXYCODONE HYDROCHLORIDE 5 MG: 5 TABLET ORAL at 10:04

## 2025-04-15 RX ADMIN — INSULIN ASPART 2 UNITS: 100 INJECTION, SOLUTION INTRAVENOUS; SUBCUTANEOUS at 06:04

## 2025-04-15 RX ADMIN — METHYLPREDNISOLONE SODIUM SUCCINATE 40 MG: 125 INJECTION, POWDER, FOR SOLUTION INTRAMUSCULAR; INTRAVENOUS at 08:04

## 2025-04-15 RX ADMIN — HYDROCHLOROTHIAZIDE 25 MG: 25 TABLET ORAL at 08:04

## 2025-04-15 RX ADMIN — PREDNISONE 40 MG: 20 TABLET ORAL at 02:04

## 2025-04-15 RX ADMIN — OXYCODONE HYDROCHLORIDE 5 MG: 5 TABLET ORAL at 08:04

## 2025-04-15 RX ADMIN — LACTULOSE 20 G: 10 SOLUTION ORAL at 08:04

## 2025-04-15 RX ADMIN — THERA TABS 1 TABLET: TAB at 08:04

## 2025-04-15 RX ADMIN — ZOLPIDEM TARTRATE 10 MG: 5 TABLET, FILM COATED ORAL at 08:04

## 2025-04-15 RX ADMIN — CEFTRIAXONE SODIUM 1 G: 1 INJECTION, POWDER, FOR SOLUTION INTRAMUSCULAR; INTRAVENOUS at 08:04

## 2025-04-15 RX ADMIN — HYPROMELLOSE 2910 2 DROP: 5 SOLUTION/ DROPS OPHTHALMIC at 10:04

## 2025-04-15 RX ADMIN — LISINOPRIL 20 MG: 20 TABLET ORAL at 08:04

## 2025-04-15 RX ADMIN — GABAPENTIN 300 MG: 300 CAPSULE ORAL at 08:04

## 2025-04-15 RX ADMIN — HYPROMELLOSE 2910 2 DROP: 5 SOLUTION/ DROPS OPHTHALMIC at 02:04

## 2025-04-15 RX ADMIN — POTASSIUM CHLORIDE 20 MEQ: 1500 TABLET, EXTENDED RELEASE ORAL at 09:04

## 2025-04-15 RX ADMIN — FLUTICASONE PROPIONATE 50 MCG: 50 SPRAY, METERED NASAL at 10:04

## 2025-04-15 NOTE — PT/OT/SLP PROGRESS
"Occupational Therapy   Treatment    Name: Vic Meng  MRN: [id number]  Admitting Diagnosis:  Acute on chronic hypoxic respiratory failure       Recommendations:     Recommended therapy intensity at discharge: Low Intensity Therapy   Discharge Equipment Recommendations:  none  Barriers to discharge:       Assessment:     Vic Meng is a 64 y.o. male with a medical diagnosis of  Acute exacerbation of chronic interstitial lung disease, HTN, DM2, COPD.  He presents with personal quad-cane in disrepair and therapist instructs pt to NOT utilize his personal QC for mobility in room. Therapy lends QC for pt to use in room during this hospitalization. Pt reports wanting to get a w/c for mobility 2/2 "I need to take so many rest breaks. I can sit to cook and clean and stuff if I have a w/c". I educated pt on home modifications/energy conservation activities to maximize pt's independence & safety in daily activities without being completely w/c dependent. Pt was receptive to edu;however, he warrants cont'd edu and skilled handouts for carryover once home.     Performance deficits affecting function are weakness, impaired endurance, impaired self care skills, impaired functional mobility, impaired balance, impaired cardiopulmonary response to activity.     Rehab Prognosis:  Good; patient would benefit from acute skilled OT services to address these deficits and reach maximum level of function.       Plan:     Patient to be seen 4 x/week to address the above listed problems via self-care/home management, therapeutic activities, therapeutic exercises, wheelchair management/training  Plan of Care Expires: 05/14/25  Plan of Care Reviewed with: patient    Subjective     Pain/Comfort:  Pain Rating 1: 0/10    Objective:     Communicated with: RN prior to session.  Patient found supine with telemetry, pulse ox (continuous), oxygen upon OT entry to room.    General Precautions: Standard, fall    Orthopedic Precautions:N/A  Braces: " N/A  Respiratory Status: Nasal cannula, flow 4 L/min  Vital Signs: Sp02: 90-95%     Occupational Performance:   Instructed spirometer use; edu'd pt on purpose/benefits. He returns demonstration   Initiated edu pt on activity modification/home modification/energy conservation strategies to promote ADL independence upon d/c.   Instructed breathing technique - tripod positioning     Bed Mobility:    Patient completed Scooting/Bridging with stand by assistance  Patient completed Supine to Sit with stand by assistance     Functional Mobility/Transfers:  Patient completed Sit <> Stand Transfer with stand by assistance  with  quad cane   Patient completed Bed <> Chair Transfer using Step Transfer technique with stand by assistance and contact guard assistance with quad cane and verbal cues for safety/technique. Pt impulsively begins to sit before completely positioned in front of chair.   Functional Mobility: ambulates in room/hallway with QC and CGA-SBA, w/c in tow. Pt requires frequent seated rest breaks t/o mobility. O2 sats <90% t/o.     Activities of Daily Living:  Lower Body Dressing: stand by assistance to doff/don socks seated EOB via fig4 tech    Skin assessment: All visible skin intact.      [facility] 6 Click ADL: 20    Patient Education:  Patient provided with verbal education education regarding OT role/goals/POC, fall prevention, safety awareness, and Discharge/DME recommendations.  Understanding was verbalized, however additional teaching warranted.    Patient left up in chair with all lines intact, call button in reach, and RN notified. Equipment needs communicated with CM.    GOALS:   Multidisciplinary Problems       Occupational Therapy Goals          Problem: Occupational Therapy    Goal Priority Disciplines Outcome Interventions   Occupational Therapy Goal     OT, PT/OT Progressing    Description: Goals to be met by 5/14/25    Pt will complete grooming standing at sink with LRAD with modified  independence.  Pt will complete UB dressing with modified independence.  Pt will complete LB dressing with modified independence using LRAD.  Pt will complete toileting with modified independence using LRAD.  Pt will complete functional mobility to/from toilet and toilet transfer with modified independence using LRAD.                         Time Tracking:     OT Date of Treatment: 04/15/25  OT Start Time: 1423  OT Stop Time: 1501  OT Total Time (min): 38 min    Billable Minutes:Self Care/Home Management 3    OT/CLEMENT: OT     Number of CLEMENT visits since last OT visit: 1    4/15/2025

## 2025-04-15 NOTE — PLAN OF CARE
Problem: Adult Inpatient Plan of Care  Goal: Plan of Care Review  Outcome: Progressing  Flowsheets (Taken 4/14/2025 2240)  Plan of Care Reviewed With: patient  Goal: Optimal Comfort and Wellbeing  Outcome: Progressing     Problem: Diabetes Comorbidity  Goal: Blood Glucose Level Within Targeted Range  Outcome: Progressing     Problem: Skin Injury Risk Increased  Goal: Skin Health and Integrity  Outcome: Progressing

## 2025-04-15 NOTE — PT/OT/SLP PROGRESS
Physical Therapy Treatment    Patient Name:  Vic Meng   MRN:  [id number]    Recommendations:     Discharge therapy intensity: Low Intensity Therapy   Discharge Equipment Recommendations: none  Barriers to discharge: Decreased caregiver support and Ongoing medical needs    Assessment:     Vic Meng is a 64 y.o. male admitted with a medical diagnosis of Acute exacerbation of chronic interstitial lung disease, HTN, DM2, COPD.  He presents with the following impairments/functional limitations: impaired endurance, impaired functional mobility, impaired cardiopulmonary response to activity.    Rehab Prognosis: Good; patient would benefit from acute skilled PT services to address these deficits and reach maximum level of function.    Recent Surgery: * No surgery found *      Plan:     During this hospitalization, patient would benefit from acute PT services 5 x/week to address the identified rehab impairments via gait training, therapeutic activities, therapeutic exercises, neuromuscular re-education and progress toward the following goals:    Plan of Care Expires:  05/13/25    Subjective     Chief Complaint: coughing & SOB  Patient/Family Comments/goals: to go home  Pain/Comfort:         Objective:     Communicated with nursing prior to session.  Patient found HOB elevated with pulse ox (continuous), telemetry, oxygen, peripheral IV upon PT entry to room.     General Precautions: Standard, fall  Orthopedic Precautions: N/A  Braces: N/A  Respiratory Status: Nasal cannula, flow 4 L/min  Blood Pressure: NT    Functional Mobility:  Bed Mobility:     Scooting: stand by assistance  Supine to Sit: stand by assistance  Transfers:     Sit to Stand:  contact guard assistance with quad cane  Bed to Chair: contact guard assistance with  quad cane  using  Step Transfer  Gait: 30'/50' w/WBQC, CGA progressing to SBA  Wheelchair Propulsion:  Pt propelled Standard wheelchair x 10' x2 feet on Level tile with  Bilateral upper extremity  and Bilateral lower extremity with Supervision or Set-up Assistance.      Therapeutic Activities/Exercises:      Education:  Patient provided with verbal education education regarding PT role/goals/POC, fall prevention, safety awareness, discharge/DME recommendations, and energy conservation/recovery techniques .  Understanding was verbalized, however additional teaching warranted.     Pt received education on DME recommendations upon discharge, energy conservation/recovery techniques, incentive spirometer use.    Patient left up in chair with all lines intact, call button in reach, and nurse notified    GOALS:   Multidisciplinary Problems       Physical Therapy Goals          Problem: Physical Therapy    Goal Priority Disciplines Outcome Interventions   Physical Therapy Goal     PT, PT/OT Progressing    Description: Goals to be met by: 25     Patient will increase functional independence with mobility by performin. Supine to sit with [place]  2. Sit to stand transfer with [place]  3. Gait  x 200 feet with Modified [place] using Rolling Walker or QC.                          Time Tracking:     PT Received On: 04/15/25  PT Start Time: 1424     PT Stop Time: 1504  PT Total Time (min): 40 min     Billable Minutes: Gait Training 1 unit and Therapeutic Activity 2 units    Treatment Type: Treatment  PT/PTA: PTA     Number of PTA visits since last PT visit: 2     04/15/2025

## 2025-04-15 NOTE — PROGRESS NOTES
Making sure arrangements are being conducted by our case management to secure him a follow-up with the rheumatology clinic  He actually has not been on any immunosuppressive therapy like Actemra as it was once mentioned in his record.  He is on 3 L oxygen and easily drops down.  Is exertional reserve is limited expectedly.  His chest x-ray is getting more consistently restricted with interstitial prominent markings.  I think he got enough course of empirical therapy that they can be switched over to suppressive Zithromax that he needs to stay on regularly and prednisone at 40 mg.

## 2025-04-15 NOTE — PROGRESS NOTES
[facility]                       [address] [place] [id number]    PATIENT NAME:       ЮЛИЯ NUNEZ   YOB: 1960  CSN:                [id number]   MRN:                [id number]  ADMIT DATE:         04/10/2025 11:41:00  PHYSICIAN:          Sarabjit Arreaga MD                            PROGRESS NOTE    DATE:  04/15/2025 00:00:00    SUBJECTIVE:  The patient is a 64-year-old  male, admitted   because of exacerbation of chronic interstitial lung disease and has been placed   on antibiotic and Solu-Medrol.  Pulmonologist is on board and monitoring the   patient's progress.      Today, again, the patient is still a little short of breath, but appeared to   have some improvement.    OBJECTIVE:  HEART:  Upon examination, the patient has S1, S2.  No murmur or   gallop.  CHEST:  Minimal congestion.  ABDOMEN:  Soft and nontender.  Good bowel sounds.   EXTREMITIES:  Superior good range of motion.  Extremity inferior, bilateral knee   pain.  No clubbing.  No cyanosis.    IMPRESSION:    1. Exacerbation of chronic interstitial lung disease.    2. Hypertension.  3. Chronic obstructive pulmonary disease.    4. Diabetes mellitus type 2.  5. History of Crohn disease.  6. Osteoarthritis.  7. Gastroesophageal reflux.    PLAN:  We will continue present care.  Today, blood pressure has been looking a   lot better, and eventually, the patient will continue with physical therapy and   expecting when cleared by pulmonologist, I will discharge the patient.      ______________________________  Sarabjit Arreaga MD    CHL/AQS  DD:  04/15/2025  Time:  12:38PM  DT:  04/15/2025  Time:  01:09PM  Job #:  [id number]      PROGRESS NOTE

## 2025-04-16 LAB
POCT GLUCOSE: 173 MG/DL (ref 70–110)
POCT GLUCOSE: 187 MG/DL (ref 70–110)
POCT GLUCOSE: 194 MG/DL (ref 70–110)
POCT GLUCOSE: 238 MG/DL (ref 70–110)
POCT GLUCOSE: 341 MG/DL (ref 70–110)

## 2025-04-16 PROCEDURE — 25000003 PHARM REV CODE 250: Performed by: INTERNAL MEDICINE

## 2025-04-16 PROCEDURE — 21400001 HC TELEMETRY ROOM

## 2025-04-16 PROCEDURE — 97535 SELF CARE MNGMENT TRAINING: CPT | Mod: CO

## 2025-04-16 PROCEDURE — 63600175 PHARM REV CODE 636 W HCPCS: Performed by: INTERNAL MEDICINE

## 2025-04-16 PROCEDURE — 25000242 PHARM REV CODE 250 ALT 637 W/ HCPCS: Performed by: INTERNAL MEDICINE

## 2025-04-16 PROCEDURE — 63700000 PHARM REV CODE 250 ALT 637 W/O HCPCS: Performed by: INTERNAL MEDICINE

## 2025-04-16 RX ADMIN — OXYCODONE HYDROCHLORIDE 5 MG: 5 TABLET ORAL at 08:04

## 2025-04-16 RX ADMIN — HYPROMELLOSE 2910 2 DROP: 5 SOLUTION/ DROPS OPHTHALMIC at 09:04

## 2025-04-16 RX ADMIN — POTASSIUM CHLORIDE 20 MEQ: 1500 TABLET, EXTENDED RELEASE ORAL at 09:04

## 2025-04-16 RX ADMIN — BUDESONIDE 3 MG: 3 CAPSULE, COATED PELLETS ORAL at 08:04

## 2025-04-16 RX ADMIN — THERA TABS 1 TABLET: TAB at 09:04

## 2025-04-16 RX ADMIN — GABAPENTIN 300 MG: 300 CAPSULE ORAL at 03:04

## 2025-04-16 RX ADMIN — CETIRIZINE HYDROCHLORIDE 10 MG: 10 TABLET, FILM COATED ORAL at 09:04

## 2025-04-16 RX ADMIN — HYDROCHLOROTHIAZIDE 25 MG: 25 TABLET ORAL at 09:04

## 2025-04-16 RX ADMIN — LACTULOSE 20 G: 10 SOLUTION ORAL at 09:04

## 2025-04-16 RX ADMIN — FLUTICASONE PROPIONATE 50 MCG: 50 SPRAY, METERED NASAL at 09:04

## 2025-04-16 RX ADMIN — INSULIN ASPART 8 UNITS: 100 INJECTION, SOLUTION INTRAVENOUS; SUBCUTANEOUS at 06:04

## 2025-04-16 RX ADMIN — FLUTICASONE FUROATE AND VILANTEROL TRIFENATATE 1 PUFF: 100; 25 POWDER RESPIRATORY (INHALATION) at 09:04

## 2025-04-16 RX ADMIN — PREDNISONE 40 MG: 20 TABLET ORAL at 09:04

## 2025-04-16 RX ADMIN — OXYCODONE HYDROCHLORIDE 5 MG: 5 TABLET ORAL at 09:04

## 2025-04-16 RX ADMIN — ZOLPIDEM TARTRATE 10 MG: 5 TABLET, FILM COATED ORAL at 08:04

## 2025-04-16 RX ADMIN — GABAPENTIN 300 MG: 300 CAPSULE ORAL at 09:04

## 2025-04-16 RX ADMIN — PANTOPRAZOLE SODIUM 40 MG: 40 TABLET, DELAYED RELEASE ORAL at 09:04

## 2025-04-16 RX ADMIN — ENOXAPARIN SODIUM 40 MG: 40 INJECTION SUBCUTANEOUS at 04:04

## 2025-04-16 RX ADMIN — ASPIRIN 81 MG: 81 TABLET, COATED ORAL at 09:04

## 2025-04-16 RX ADMIN — HYPROMELLOSE 2910 2 DROP: 5 SOLUTION/ DROPS OPHTHALMIC at 06:04

## 2025-04-16 RX ADMIN — GABAPENTIN 300 MG: 300 CAPSULE ORAL at 08:04

## 2025-04-16 RX ADMIN — OXYCODONE HYDROCHLORIDE 5 MG: 5 TABLET ORAL at 04:04

## 2025-04-16 RX ADMIN — LISINOPRIL 20 MG: 20 TABLET ORAL at 09:04

## 2025-04-16 RX ADMIN — AZITHROMYCIN DIHYDRATE 250 MG: 250 TABLET ORAL at 09:04

## 2025-04-16 RX ADMIN — BUDESONIDE 3 MG: 3 CAPSULE, COATED PELLETS ORAL at 09:04

## 2025-04-16 NOTE — PROGRESS NOTES
We are moving ahead with the plans that can be arranged for this unfortunate patient given the limitations of what additional things could be done for him as is.  Oxygen is being set up which he will need to am pretty sure  I switched him over to prednisone yesterday and that has to be tapered very slowly until he is seen by Rheumatology which is quite important for him.  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  It is very hard to put this man on complicated medications like that he does not have the proper understanding nor the support at home for those medications and the need of close monitoring and follow-up.  As I mentioned in my office note which I scanned into this current chart that he was placed on Ofev at 1 point in time before he came see me and it caused him a lot of GI side effect.  Case management as worked on referring him back to rheumatology.  He thinks his rheumatologist has left the area.  Keep him on a Zithromax 250 mg tablet that I put him on just as a suppressive therapy and it has some anti-inflammatory role.  Prednisone can be brought down by 5 mg every 5 days and to keep him on 10 mg maintenance indefinitely until he is seen by Rheumatology.  I do not have much to add actually an hour think I will have more to offer at the office but I will be happy for questions in case he needs to.

## 2025-04-16 NOTE — PROGRESS NOTES
[facility]                       [address] [address]    PATIENT NAME:       ЮЛИЯ NUNEZ   YOB: 1960  CSN:                [id number]   MRN:                [id number]  ADMIT DATE:         04/10/2025 11:41:00  PHYSICIAN:          Sarabjit Arreaga MD                            PROGRESS NOTE    DATE:      SUBJECTIVE:  The patient is a 64-year-old  male, admitted   because of exacerbation of chronic interstitial lung disease, was seen by the   pulmonologist and he was put on antibiotics and also Solu-Medrol.  Apparently,   the patient is getting better, but still short of breath.  Apparently, the   pulmonologist thinks the patient probably can be discharged and seen as an   outpatient.  Today, it was discovered also the patient has a sacral ulcer and I   am consulting the Wound Care to evaluate the patient for the ulcer and as per   their recommendation if everything continues to be stable, I will discharge the   patient tomorrow.    OBJECTIVE:  Today, on examination:  HEART:  The patient has S1, S2.  No murmur or gallop.  CHEST:  Clear.  No wheezing or rales.  ABDOMEN:  Soft, nontender.  Good bowel sounds.  EXTREMITIES:  Superior inferior, patient has pain in both knees and again, the   patient has some dehiscence in the sacral area.    IMPRESSION:    1. Chronic exacerbation chronic interstitial lung disease.  2. Sacral decubitus ulcer.  3. Chronic obstructive pulmonary disease.  4. Diabetes mellitus type 2.  5. History of Crohn disease.  6. Osteoarthritis.  7. Gastroesophageal reflux.    PLAN:  We will let the Wound Care to evaluate the patient and if everything is   stable, again, the patient will be discharged tomorrow.    ______________________________  Sarabjit Arreaga MD    CHL/AQS  DD:  04/16/2025  Time:  02:59PM  DT:  04/16/2025  Time:  03:36PM  Job #:   [id number]      PROGRESS NOTE

## 2025-04-16 NOTE — PLAN OF CARE
[facility] informed me that pt can have either the wc or cane and the pt selected the wc. [facility] delivered the wc to pt room .

## 2025-04-17 VITALS
TEMPERATURE: 98 F | RESPIRATION RATE: 23 BRPM | HEART RATE: 82 BPM | OXYGEN SATURATION: 100 % | SYSTOLIC BLOOD PRESSURE: 114 MMHG | DIASTOLIC BLOOD PRESSURE: 75 MMHG | WEIGHT: 277 LBS | HEIGHT: 74 IN | BODY MASS INDEX: 35.55 KG/M2

## 2025-04-17 PROBLEM — J96.21 ACUTE ON CHRONIC HYPOXIC RESPIRATORY FAILURE: Status: RESOLVED | Noted: 2025-04-10 | Resolved: 2025-04-17

## 2025-04-17 LAB
POCT GLUCOSE: 103 MG/DL (ref 70–110)
POCT GLUCOSE: 135 MG/DL (ref 70–110)
POCT GLUCOSE: 200 MG/DL (ref 70–110)

## 2025-04-17 PROCEDURE — 97116 GAIT TRAINING THERAPY: CPT | Mod: CQ

## 2025-04-17 PROCEDURE — 63700000 PHARM REV CODE 250 ALT 637 W/O HCPCS: Performed by: INTERNAL MEDICINE

## 2025-04-17 PROCEDURE — 25000003 PHARM REV CODE 250: Performed by: INTERNAL MEDICINE

## 2025-04-17 PROCEDURE — 63600175 PHARM REV CODE 636 W HCPCS: Performed by: INTERNAL MEDICINE

## 2025-04-17 PROCEDURE — 97535 SELF CARE MNGMENT TRAINING: CPT | Mod: CO

## 2025-04-17 PROCEDURE — 25000242 PHARM REV CODE 250 ALT 637 W/ HCPCS: Performed by: INTERNAL MEDICINE

## 2025-04-17 RX ORDER — MICONAZOLE NITRATE 2 G/100G
POWDER TOPICAL 2 TIMES DAILY
Status: DISCONTINUED | OUTPATIENT
Start: 2025-04-17 | End: 2025-04-17 | Stop reason: HOSPADM

## 2025-04-17 RX ADMIN — Medication: at 12:04

## 2025-04-17 RX ADMIN — MICONAZOLE NITRATE: 20 POWDER TOPICAL at 02:04

## 2025-04-17 RX ADMIN — PANTOPRAZOLE SODIUM 40 MG: 40 TABLET, DELAYED RELEASE ORAL at 09:04

## 2025-04-17 RX ADMIN — FLUTICASONE PROPIONATE 50 MCG: 50 SPRAY, METERED NASAL at 09:04

## 2025-04-17 RX ADMIN — PREDNISONE 40 MG: 20 TABLET ORAL at 09:04

## 2025-04-17 RX ADMIN — GABAPENTIN 300 MG: 300 CAPSULE ORAL at 09:04

## 2025-04-17 RX ADMIN — LISINOPRIL 20 MG: 20 TABLET ORAL at 09:04

## 2025-04-17 RX ADMIN — THERA TABS 1 TABLET: TAB at 09:04

## 2025-04-17 RX ADMIN — AZITHROMYCIN DIHYDRATE 250 MG: 250 TABLET ORAL at 09:04

## 2025-04-17 RX ADMIN — BUDESONIDE 3 MG: 3 CAPSULE, COATED PELLETS ORAL at 10:04

## 2025-04-17 RX ADMIN — LACTULOSE 20 G: 10 SOLUTION ORAL at 09:04

## 2025-04-17 RX ADMIN — HYPROMELLOSE 2910 2 DROP: 5 SOLUTION/ DROPS OPHTHALMIC at 02:04

## 2025-04-17 RX ADMIN — CETIRIZINE HYDROCHLORIDE 10 MG: 10 TABLET, FILM COATED ORAL at 09:04

## 2025-04-17 RX ADMIN — HYPROMELLOSE 2910 2 DROP: 5 SOLUTION/ DROPS OPHTHALMIC at 09:04

## 2025-04-17 RX ADMIN — POTASSIUM CHLORIDE 20 MEQ: 1500 TABLET, EXTENDED RELEASE ORAL at 09:04

## 2025-04-17 RX ADMIN — HYDROCHLOROTHIAZIDE 25 MG: 25 TABLET ORAL at 09:04

## 2025-04-17 RX ADMIN — ASPIRIN 81 MG: 81 TABLET, COATED ORAL at 09:04

## 2025-04-17 RX ADMIN — OXYCODONE HYDROCHLORIDE 5 MG: 5 TABLET ORAL at 12:04

## 2025-04-17 RX ADMIN — FLUTICASONE FUROATE AND VILANTEROL TRIFENATATE 1 PUFF: 100; 25 POWDER RESPIRATORY (INHALATION) at 09:04

## 2025-04-17 RX ADMIN — GABAPENTIN 300 MG: 300 CAPSULE ORAL at 02:04

## 2025-04-17 NOTE — CONSULTS
Subjective:      Patient ID: Vic Meng is a 64 y.o. male.    Chief Complaint: Shortness of Breath (SOB on exertion. Pt normally on 3L NC S/T COPD, Asthma. On 4L NC on arrival, SPO2 94%. EMS reports with exertion, SPO2 70s. Tachypnea noted. )    HPI  Review of Systems   Objective:     Physical Exam   Assessment:     1. Acute on chronic hypoxic respiratory failure    2. Shortness of breath    3. Right knee pain    4. Osteoarthritis of right knee, unspecified osteoarthritis type    5. Bronchitis    6. Interstitial lung disease    7. Venous insufficiency of lower extremity    8. Primary osteoarthritis of right knee    9. Rheumatoid arthritis involving multiple sites with positive rheumatoid factor    10. Antisynthetase syndrome           Wound 04/16/25 1412 Other (comment) Right lower Buttocks (Active)   04/16/25 1412 Buttocks   Present on Original Admission: Y   Primary Wound Type: Other   Side: Right   Orientation: lower   Wound Approximate Age at First Assessment (Weeks):    Wound Number:    Is this injury device related?:    Incision Type:    Closure Method:    Wound Description (Comments):    Type:    Additional Comments:    Ankle-Brachial Index:    Pulses:    Removal Indication and Assessment:    Wound Outcome:    Wound Image   04/17/25 1000   Pressure Injury Stage 1 04/16/25 0800   Dressing Appearance Intact;Dry 04/17/25 1000   Drainage Amount None 04/17/25 1000   Appearance Pink;Moist 04/17/25 1000   Tissue loss description Partial thickness 04/17/25 1000   Red (%), Wound Tissue Color 100 % 04/17/25 1000   Periwound Area Intact;Moist 04/17/25 1000   Wound Edges Irregular 04/17/25 1000   Wound Length (cm) 0.4 cm 04/17/25 1000   Wound Width (cm) 0.4 cm 04/17/25 1000   Wound Surface Area (cm^2) 0.13 cm^2 04/17/25 1000   Care Cleansed with:;Wound cleanser;Applied:;Skin Barrier 04/17/25 1000   Dressing Absorptive Pad 04/17/25 1000   Periwound Care Dry periwound area maintained 04/17/25 0800       Plan:        Wocn  consult.  Right lower buttock moist area  63 y/o male in with acute on chronic resp fail.    Awake alert oriented and mobil.  Up in chair using a cane at present.    Consulted on a sm lesion-appears moisture related-over heated area in buttock creases and groin.  Care put in place with orders to nursing.    Will follow up next week.

## 2025-04-17 NOTE — PT/OT/SLP PROGRESS
Physical Therapy Treatment    Patient Name:  Vic Meng   MRN:  [id number]    Recommendations:     Discharge therapy intensity: Low Intensity Therapy   Discharge Equipment Recommendations: other (see comments) (wheelchair has been delivered to the hospital)  Barriers to discharge: Decreased caregiver support and Ongoing medical needs    Assessment:     Vic Meng is a 64 y.o. male admitted with a medical diagnosis of Acute exacerbation of chronic interstitial lung disease, HTN, DM2, COPD.  He presents with the following impairments/functional limitations: weakness, impaired endurance, gait instability, impaired balance, impaired functional mobility, impaired self care skills, impaired cardiopulmonary response to activity.    Pt sitting up in bed with RN in room reviewing d/c instructions. Pt agreeable to therapy due to d/c delayed for about an hour. Reviewed home environment and fall precautions. Pt reports he uses his cane in the home and the rollator outside because it doesn't fit well in the home. Pt expressed feeling depressed regarding his home situation with his daughter, comfort provided.     Rehab Prognosis: Good; patient would benefit from acute skilled PT services to address these deficits and reach maximum level of function.    Recent Surgery: * No surgery found *      Plan:     During this hospitalization, patient would benefit from acute PT services 5 x/week to address the identified rehab impairments via gait training, therapeutic activities, therapeutic exercises and progress toward the following goals:    Plan of Care Expires:  05/13/25    Subjective     Chief Complaint: SOB with activity  Patient/Family Comments/goals: to go home  Pain/Comfort:  Pain Rating 1: 0/10      Objective:     Communicated with nursing prior to session.  Patient found HOB elevated with pulse ox (continuous), telemetry, oxygen, peripheral IV upon PT entry to room.     General Precautions: Standard, fall  Orthopedic  Precautions: N/A  Braces: N/A  Respiratory Status: Nasal cannula, flow 4 L/min  Blood Pressure: NT    Functional Mobility:  Bed Mobility:     Supine<->sit: Mod I  Transfers:     Sit to Stand:  SBA with RW x 2 trials  Gait: 50' x 2 trials with RW, CGA with chair following. HR increased to 122 bpm and SPO2 decreased to 84%. Recovered fairly quickly with seated rest. Reviewed conservation strategies when mobilizing at home.     Therapeutic Activities/Exercises:      Education:  Patient provided with verbal education education regarding PT role/goals/POC, fall prevention, safety awareness, discharge/DME recommendations, and energy conservation/recovery techniques .  Understanding was verbalized, however additional teaching warranted.       Patient left HOB elevated with all lines intact, call button in reach, and nurse notified.    GOALS:   Multidisciplinary Problems       Physical Therapy Goals          Problem: Physical Therapy    Goal Priority Disciplines Outcome Interventions   Physical Therapy Goal     PT, PT/OT Progressing    Description: Goals to be met by: 25     Patient will increase functional independence with mobility by performin. Supine to sit with Noble  2. Sit to stand transfer with Noble  3. Gait  x 200 feet with Modified Noble using Rolling Walker or QC.                          Time Tracking:     PT Received On: 25  PT Start Time: 1440     PT Stop Time: 1510  PT Total Time (min): 30 min     Billable Minutes: Gait Training 2 units    Treatment Type: Treatment  PT/PTA: PTA     Number of PTA visits since last PT visit: 3     2025

## 2025-04-17 NOTE — PLAN OF CARE
Pt will dc to home with NSI      RODERICK, dc info sent to     Christiano at bedside    [employer] is otw to bring 02 tank for ride home    Transportation will be uber

## 2025-04-17 NOTE — PROGRESS NOTES
Reviewed discharge instructions with patient. All questions were answered and pt denied having questions at this time. Pt verbalized understanding. Dc'd cardiac monitoring and IV. Awaiting oxygen. Awaiting uber.

## 2025-04-17 NOTE — PLAN OF CARE
Pt reports that he has 02 with [facility] and he has no one to bring his 02 tank to hospital. I have informed [employer] nie[employer] rep that pt has no one to bring a tank and nurse can not dc without it . Pt is on 4lnc    He informed me that it will be late afternoon before pt will get his 02 tank from them.    1540- [facility] called for ETA on 02 delivery- awaiting response.    Lindsay- daughter called to see if she could bring 02 - no answer     3193- spoke to Abhishek Nieves and he is bringing 02 now.

## 2025-04-17 NOTE — PT/OT/SLP PROGRESS
Occupational Therapy   Treatment    Name: Vic Meng  MRN: [id number]  Admitting Diagnosis:  Acute on chronic hypoxic respiratory failure       Recommendations:     Recommended therapy intensity at discharge: Low Intensity Therapy   Discharge Equipment Recommendations:  none  Barriers to discharge:       Assessment:     Vic Meng is a 64 y.o. male with a medical diagnosis of Acute on chronic hypoxic respiratory failure.  Performance deficits affecting function are weakness, impaired endurance, impaired self care skills, impaired functional mobility, impaired balance, impaired cardiopulmonary response to activity.     Rehab Prognosis:  Good; patient would benefit from acute skilled OT services to address these deficits and reach maximum level of function.       Plan:     Patient to be seen 4 x/week to address the above listed problems via self-care/home management, therapeutic activities, therapeutic exercises, wheelchair management/training  Plan of Care Expires: 05/14/25  Plan of Care Reviewed with: patient    Subjective     Pain/Comfort:       Objective:     Communicated with: RN prior to session.  Patient found HOB elevated with   upon OT entry to room.    General Precautions: Standard, fall    Orthopedic Precautions:N/A  Braces: N/A     Occupational Performance:     Bed Mobility:    Patient completed Supine to Sit with modified independence using bed rail and HOB elevated    Functional Mobility/Transfers:  Patient completed Sit <> Stand Transfer with modified independence  with  quad cane   Patient completed Bed <> Chair Transfer using Step Transfer technique with modified independence with quad cane  Functional Mobility: no LOB while mobilizing from EOB to bedside chair    Activities of Daily Living:  Lower Body Dressing: stand by assistance donning underwear and socks forward flexed seated EOB    Patient Education:  Patient provided with verbal education education regarding OT role/goals/POC.  Understanding  was verbalized.      Patient left up in chair with all lines intact and call button in reach.    GOALS:   Multidisciplinary Problems       Occupational Therapy Goals          Problem: Occupational Therapy    Goal Priority Disciplines Outcome Interventions   Occupational Therapy Goal     OT, PT/OT Progressing    Description: Goals to be met by 5/14/25    Pt will complete grooming standing at sink with LRAD with modified independence.  Pt will complete UB dressing with modified independence.  Pt will complete LB dressing with modified independence using LRAD.  Pt will complete toileting with modified independence using LRAD.  Pt will complete functional mobility to/from toilet and toilet transfer with modified independence using LRAD.                         Time Tracking:     OT Date of Treatment: 04/17/25  OT Start Time: 0917  OT Stop Time: 0931  OT Total Time (min): 14 min    Billable Minutes:Self Care/Home Management 1    OT/CLEMENT: CLEMENT     Number of CLEMENT visits since last OT visit: 3    4/17/2025

## 2025-04-17 NOTE — DISCHARGE SUMMARY
[facility]                       [address] [address]    PATIENT NAME:       VIC NUNEZ   YOB: 1960  CSN:                [id number]   MRN:                [id number]  ADMIT DATE:         04/10/2025 11:41:00  PHYSICIAN:          Sarabjit Arreaga MD                          DISCHARGE SUMMARY    DATE OF DISCHARGE:  04/17/2025 00:00:00    FINAL DIAGNOSES:    1. Exacerbation of chronic interstitial disease.  2. Acute on chronic respiratory failure.  3. Diabetes mellitus type 2.  4. Osteoarthritis.  5. Gastroesophageal reflux.  6. Hypertension.  7. Anemia of chronic disease.    HOSPITAL COURSE:  Mr. Vic Nunez is a 64-year-old  male, again   with history of COPD, chronic interstitial lung disease, who was admitted   because of significant shortness of breath.  Therefore, the patient was admitted   and placed on antibiotics and prednisone.  The pulmonologist was consulting and   then had definitely agreed and put the patient on antibiotic and presently on   Solu-Medrol.  During this admission, the patient also has developed a sacral   decubitus ulcer.  I did recommend physical therapy for the patient.  At this   point, progressively the patient has been improving.  The pulmonologist   eventually came this morning and agreed that the patient can be discharged, so   therefore I am discharging the patient to home.  He recommended the patient to   be on Zithromax 150 mg daily and prednisone.  At the time of discharge, the   patient is very stable and hopefully the patient will continue to do well, and   we will see him as an outpatient.        ______________________________  Sarabjit Arreaga MD    CHL/AQS  DD:  04/17/2025  Time:  01:20PM  DT:  04/17/2025  Time:  02:21PM  Job #:  [id number]      DISCHARGE SUMMARY

## 2025-04-21 ENCOUNTER — PATIENT OUTREACH (OUTPATIENT)
Dept: ADMINISTRATIVE | Facility: CLINIC | Age: 65
End: 2025-04-21
Payer: MEDICARE

## 2025-04-21 NOTE — PROGRESS NOTES
C3 nurse spoke with Vic Meng who is unable to complete the call at this time and requested a callback. Patient has a HOSFU with Non [facility] PCP Sarabjit Arreaga MD (Internal Medicine) on 5/28/2025; @10:00am.

## 2025-04-24 NOTE — PROGRESS NOTES
C3 nurse attempted to contact Vic Meng for a TCC post hospital discharge follow up call. No answer. The patient has a scheduled [facility] appointment with Non [facility] PCP Sarabjit Arreaga MD (Internal Medicine) on 5/28/2025; @10:00am. .

## 2025-04-25 NOTE — PROGRESS NOTES
C3 nurse spoke with Vic Meng for a TCC post hospital discharge follow up call. The patient has a scheduled appointment with Non [facility] PCP  Sarabjit Arreaga MD (Internal Medicine) on 5/28/2025; @10:00 am.

## (undated) DEVICE — TIP SUCTION YANKAUER

## (undated) DEVICE — COLLECTION SPECIMEN NEPTUNE

## (undated) DEVICE — KIT SURGICAL COLON .25 1.1OZ

## (undated) DEVICE — CONTAINER SPEC STRL PATH 4OZ

## (undated) DEVICE — BAG LABGUARD BIOHAZARD 6X9IN

## (undated) DEVICE — FORCEP BX LG CAP 2.8MMX240CM

## (undated) DEVICE — KIT CANIST SUCTION 1200CC

## (undated) DEVICE — ADAPTER DUAL NSL LUER M-M 7FT

## (undated) DEVICE — UNDERPAD DISPOSABLE 30X30IN

## (undated) DEVICE — FORCEP BPSY RJ LG 2.2MM 240CM

## (undated) DEVICE — BLOCK BLOX BITE DENT RIM 54FR

## (undated) DEVICE — SOL IRRI STRL WATER 1000ML